# Patient Record
Sex: FEMALE | Race: WHITE | Employment: PART TIME | URBAN - METROPOLITAN AREA
[De-identification: names, ages, dates, MRNs, and addresses within clinical notes are randomized per-mention and may not be internally consistent; named-entity substitution may affect disease eponyms.]

---

## 2017-04-16 NOTE — TELEPHONE ENCOUNTER
Hypothyroid Medications  Protocol Criteria:  Appointment scheduled in the past 12 months or the next 3 months  TSH resulted in the past 12 months that is normal  Recent Visits       Provider Department Primary Dx    1 year ago Narciso Junior MD [place]

## 2017-04-20 RX ORDER — LEVOTHYROXINE SODIUM 175 UG/1
TABLET ORAL
Qty: 90 TABLET | Refills: 3 | OUTPATIENT
Start: 2017-04-20

## 2017-04-20 NOTE — TELEPHONE ENCOUNTER
[id number]. Please schedule an appointment to reestablish with new PCP. Transfer to clinical staff after scheduled.

## 2017-04-22 ENCOUNTER — TELEPHONE (OUTPATIENT)
Dept: INTERNAL MEDICINE CLINIC | Facility: CLINIC | Age: 52
End: 2017-04-22

## 2017-04-22 NOTE — TELEPHONE ENCOUNTER
Patient called back to set up an appt. With new PCP and wants to get her RX refilled  An appt. Has been set up with Dr. Sandi Wisdom for a Physical appt.  For 5/16 at 3PM   Patient would like a call back to confirm she will be able to get her prescription filled

## 2017-04-24 NOTE — TELEPHONE ENCOUNTER
Dr. Dorcas Ravi can we give her 30 day supply with no refills?  Patient has an appointment with you 5/16

## 2017-05-16 ENCOUNTER — OFFICE VISIT (OUTPATIENT)
Dept: INTERNAL MEDICINE CLINIC | Facility: CLINIC | Age: 52
End: 2017-05-16

## 2017-05-16 VITALS
BODY MASS INDEX: 41.91 KG/M2 | WEIGHT: 222 LBS | TEMPERATURE: 99 F | HEIGHT: 61 IN | SYSTOLIC BLOOD PRESSURE: 143 MMHG | HEART RATE: 101 BPM | DIASTOLIC BLOOD PRESSURE: 82 MMHG

## 2017-05-16 DIAGNOSIS — Z12.11 SCREENING FOR COLON CANCER: ICD-10-CM

## 2017-05-16 DIAGNOSIS — E66.01 MORBID OBESITY DUE TO EXCESS CALORIES (HCC): ICD-10-CM

## 2017-05-16 DIAGNOSIS — E03.9 HYPOTHYROIDISM, UNSPECIFIED TYPE: ICD-10-CM

## 2017-05-16 DIAGNOSIS — R06.83 SNORING: ICD-10-CM

## 2017-05-16 DIAGNOSIS — R29.818 SUSPECTED SLEEP APNEA: ICD-10-CM

## 2017-05-16 DIAGNOSIS — Z00.00 PHYSICAL EXAM: Primary | ICD-10-CM

## 2017-05-16 DIAGNOSIS — N95.0 POSTMENOPAUSAL BLEEDING: ICD-10-CM

## 2017-05-16 DIAGNOSIS — Z12.31 VISIT FOR SCREENING MAMMOGRAM: ICD-10-CM

## 2017-05-16 DIAGNOSIS — R73.9 HYPERGLYCEMIA: ICD-10-CM

## 2017-05-16 PROCEDURE — 99396 PREV VISIT EST AGE 40-64: CPT | Performed by: INTERNAL MEDICINE

## 2017-05-16 PROCEDURE — 99213 OFFICE O/P EST LOW 20 MIN: CPT | Performed by: INTERNAL MEDICINE

## 2017-05-16 RX ORDER — LEVOTHYROXINE SODIUM 175 UG/1
175 TABLET ORAL
Qty: 90 TABLET | Refills: 3 | Status: SHIPPED | OUTPATIENT
Start: 2017-05-16 | End: 2018-05-10

## 2017-05-16 NOTE — PROGRESS NOTES
HPI:    Patient ID: Virgil Kendall is a 45year old female.     HPI      PHYSICAL EXAM    REFILL LEVOTHYROXINE    COMPLAIN OF CHRONIC OBESITY  AND CHRONIC FATIGUE  NOTICING SHE IS VERY FORGETFUL  SNORING   FALLS ASLEEP DURING THE DAY    BP NEVER THIS HIGH  BP bruise/bleed easily. Psychiatric/Behavioral: Negative for behavioral problems and agitation. Current Outpatient Prescriptions:  Levothyroxine Sodium (SYNTHROID, LEVOTHROID) 175 MCG Oral Tab Take 1 tablet (175 mcg total) by mouth once daily.  Dis Potassium      3.3 - 5.1 mmol/L 3.9   Chloride      95 - 110 mmol/L 105   Carbon Dioxide, Total      22 - 32 mmol/L 27   BUN      8 - 20 mg/dL 13   CREATININE      0.50 - 1.50 mg/dL 0.65   BUN/CREA RATIO      10.0 - 20.0 20.0   ANION GAP      0 - 18 6 Negative mg/dL Negative   URINE SQUAMOUS EPITHELIAL CELL       Few   URINE WBC      0 - 5 /HPF 1   URINE RBC      0 - 3 /HPF <1   URINE BACTERIA      None seen Few (A)   MICROSCOPIC URINALYSIS COMMENT       Completed   HDL Cholesterol       50   CHOLEST and diagnostic testing reviewed. Dietary and lifestyle change discussed. Modification of risk for CAD discussed. Patient voiced understanding and agrees with current plan and management.         No orders of the defined types were placed in this encounte

## 2017-05-18 ENCOUNTER — APPOINTMENT (OUTPATIENT)
Dept: LAB | Facility: HOSPITAL | Age: 52
End: 2017-05-18
Attending: INTERNAL MEDICINE
Payer: COMMERCIAL

## 2017-05-18 DIAGNOSIS — Z00.00 PHYSICAL EXAM: ICD-10-CM

## 2017-05-18 PROCEDURE — 84443 ASSAY THYROID STIM HORMONE: CPT

## 2017-05-18 PROCEDURE — 85027 COMPLETE CBC AUTOMATED: CPT

## 2017-05-18 PROCEDURE — 80053 COMPREHEN METABOLIC PANEL: CPT

## 2017-05-18 PROCEDURE — 84439 ASSAY OF FREE THYROXINE: CPT

## 2017-05-18 PROCEDURE — 36415 COLL VENOUS BLD VENIPUNCTURE: CPT

## 2017-05-18 PROCEDURE — 82607 VITAMIN B-12: CPT

## 2017-05-18 PROCEDURE — 82306 VITAMIN D 25 HYDROXY: CPT

## 2017-05-18 PROCEDURE — 93010 ELECTROCARDIOGRAM REPORT: CPT | Performed by: INTERNAL MEDICINE

## 2017-05-18 PROCEDURE — 83036 HEMOGLOBIN GLYCOSYLATED A1C: CPT

## 2017-05-18 PROCEDURE — 80061 LIPID PANEL: CPT

## 2017-05-18 PROCEDURE — 82746 ASSAY OF FOLIC ACID SERUM: CPT

## 2017-05-18 PROCEDURE — 81001 URINALYSIS AUTO W/SCOPE: CPT

## 2017-05-18 PROCEDURE — 93005 ELECTROCARDIOGRAM TRACING: CPT

## 2017-05-23 ENCOUNTER — TELEPHONE (OUTPATIENT)
Dept: INTERNAL MEDICINE CLINIC | Facility: CLINIC | Age: 52
End: 2017-05-23

## 2017-05-23 NOTE — TELEPHONE ENCOUNTER
----- Message from Maria Luisa Ovalle MD sent at 5/23/2017  7:28 AM CDT -----  Send  Letter and copy of test result.   ABNORMAL EKG    POOR R WAVE PROGRESSION  NO ACUTE FINDINGS  FOLLOW UP TO DISCUS WITHIN THE NEXT MONTH

## 2017-05-26 ENCOUNTER — HOSPITAL ENCOUNTER (OUTPATIENT)
Dept: ULTRASOUND IMAGING | Age: 52
Discharge: HOME | End: 2017-05-26
Attending: INTERNAL MEDICINE
Payer: COMMERCIAL

## 2017-05-26 ENCOUNTER — HOSPITAL ENCOUNTER (OUTPATIENT)
Dept: MAMMOGRAPHY | Age: 52
Discharge: HOME | End: 2017-05-26
Attending: INTERNAL MEDICINE
Payer: COMMERCIAL

## 2017-05-26 DIAGNOSIS — Z12.31 VISIT FOR SCREENING MAMMOGRAM: ICD-10-CM

## 2017-05-26 DIAGNOSIS — N95.0 POSTMENOPAUSAL BLEEDING: ICD-10-CM

## 2017-05-26 PROCEDURE — 77067 SCR MAMMO BI INCL CAD: CPT | Performed by: INTERNAL MEDICINE

## 2017-05-27 ENCOUNTER — TELEPHONE (OUTPATIENT)
Dept: INTERNAL MEDICINE CLINIC | Facility: CLINIC | Age: 52
End: 2017-05-27

## 2017-05-27 DIAGNOSIS — E55.9 VITAMIN D DEFICIENCY: Primary | ICD-10-CM

## 2017-05-27 NOTE — TELEPHONE ENCOUNTER
Notes Recorded by Kentrell Torrez MD on 5/20/2017 at 10:02 PM  Vitamin D is low  Please call patient and give Vit D 50,000 units once a week for 12 wks   Send patient and order for Vit D level in 12 wks  Give Vit  D 50,000 units  # 12  Once a week    [facility]

## 2017-06-01 ENCOUNTER — HOSPITAL ENCOUNTER (OUTPATIENT)
Dept: ULTRASOUND IMAGING | Age: 52
Discharge: HOME | End: 2017-06-01
Attending: INTERNAL MEDICINE
Payer: COMMERCIAL

## 2017-06-01 PROCEDURE — 76856 US EXAM PELVIC COMPLETE: CPT | Performed by: INTERNAL MEDICINE

## 2017-06-01 PROCEDURE — 76830 TRANSVAGINAL US NON-OB: CPT | Performed by: INTERNAL MEDICINE

## 2017-07-18 ENCOUNTER — OFFICE VISIT (OUTPATIENT)
Dept: SLEEP CENTER | Age: 52
End: 2017-07-18
Attending: INTERNAL MEDICINE
Payer: COMMERCIAL

## 2017-07-18 DIAGNOSIS — Z76.89 SLEEP CONCERN: Primary | ICD-10-CM

## 2017-07-18 PROCEDURE — 95810 POLYSOM 6/> YRS 4/> PARAM: CPT

## 2017-07-19 ENCOUNTER — OFFICE VISIT (OUTPATIENT)
Dept: OBGYN CLINIC | Facility: CLINIC | Age: 52
End: 2017-07-19

## 2017-07-19 VITALS
SYSTOLIC BLOOD PRESSURE: 143 MMHG | HEART RATE: 63 BPM | WEIGHT: 223 LBS | DIASTOLIC BLOOD PRESSURE: 83 MMHG | BODY MASS INDEX: 42 KG/M2

## 2017-07-19 DIAGNOSIS — N95.0 POSTMENOPAUSAL BLEEDING: Primary | ICD-10-CM

## 2017-07-19 PROCEDURE — 58100 BIOPSY OF UTERUS LINING: CPT | Performed by: OBSTETRICS & GYNECOLOGY

## 2017-07-19 PROCEDURE — 99202 OFFICE O/P NEW SF 15 MIN: CPT | Performed by: OBSTETRICS & GYNECOLOGY

## 2017-07-20 NOTE — PROCEDURES
[address] [place] [address]  Accredited by the [place] [employer] (AASM)    PATIENT'S NAME: Marcus Pennington   ATTENDING PHYSICIAN: Jaime Yan. Jose Martinez MD   REFERRING PHYSICIAN: Jaime Yan.  Jose Martinez MD   PATIENT ACCOUNT #: [id number] LOCATION:  spontaneous arousal index is 6.0 events per hour for a combined arousal index of 8.6 events per hour. There were no significant periodic limb movements. The lowest desaturation was to 86%.   The average heart rate was 73 beats per minute and there was no

## 2017-07-20 NOTE — PROGRESS NOTES
Alvin Jackson is a 45year old female [id number] Patient's last menstrual period was 04/22/2017. Patient presents with:  Postmenopausal Bleeding: New Patient     New pt. Thinks she went through menopause in her 45s.   However, in last 5 years, she had 3 epis motion  Uterus: normal in size, contour, position, mobility, without tenderness  Adnexa: normal without masses or tenderness      ENDOMETRIAL BIOPSY  After getting informed consent,  Speculum placed in vagina. Cleaned with betadine.   Single tooth tenecul

## 2017-08-01 ENCOUNTER — TELEPHONE (OUTPATIENT)
Dept: INTERNAL MEDICINE CLINIC | Facility: CLINIC | Age: 52
End: 2017-08-01

## 2017-08-01 NOTE — TELEPHONE ENCOUNTER
Dr. Yancy Street-    Patient is asking for clarification--should she continue Vit D? She has taken 10 of 12 tablets. She is requesting to get level drawn prior to office visit--she has a level ordered to be drawn 8/27/17. Can she get her blood drawn first/early?

## 2017-08-01 NOTE — TELEPHONE ENCOUNTER
Actions Requested: advise needed  Problem: dry, scaly skin  Onset and Timin weeks ago  Associated Symptoms: dry, scaly skin on arms, back, legs  Aggravating by: n/a  Alleviated by: n/a  Triage Note: Patient states her Vitamin D level on 17 was low

## 2017-08-03 NOTE — TELEPHONE ENCOUNTER
Get blood drawn and I will give additional script  Please clarify your question   Not veryclear to me    The order is there

## 2017-08-07 ENCOUNTER — APPOINTMENT (OUTPATIENT)
Dept: LAB | Age: 52
End: 2017-08-07
Attending: INTERNAL MEDICINE
Payer: COMMERCIAL

## 2017-08-07 DIAGNOSIS — E55.9 VITAMIN D DEFICIENCY: ICD-10-CM

## 2017-08-07 PROCEDURE — 82306 VITAMIN D 25 HYDROXY: CPT

## 2017-08-07 PROCEDURE — 36415 COLL VENOUS BLD VENIPUNCTURE: CPT

## 2017-08-08 LAB — 25(OH)D3 SERPL-MCNC: 30 NG/ML

## 2017-08-11 RX ORDER — ERGOCALCIFEROL 1.25 MG/1
CAPSULE ORAL
Qty: 12 CAPSULE | Refills: 0 | Status: SHIPPED | OUTPATIENT
Start: 2017-08-11 | End: 2018-11-13

## 2017-10-18 ENCOUNTER — OFFICE VISIT (OUTPATIENT)
Dept: SLEEP CENTER | Age: 52
End: 2017-10-18
Attending: INTERNAL MEDICINE
Payer: COMMERCIAL

## 2017-10-18 DIAGNOSIS — Z76.89 SLEEP CONCERN: Primary | ICD-10-CM

## 2017-10-18 PROCEDURE — 95811 POLYSOM 6/>YRS CPAP 4/> PARM: CPT

## 2017-10-23 NOTE — PROCEDURES
[facility]  Accredited by the [place] [employer] (AASM)    PATIENT'S NAME: Shae Valverde   ATTENDING PHYSICIAN: Jeff Lebron. Sandi Wisdom MD   REFERRING PHYSICIAN: Jeff Lebron.  Sandi Wisdom MD   PATIENT ACCOUNT #: [id number] LOCATION:  There were no significant periodic limb movements. The lowest desaturation was to 90%. The average heart rate was 73 beats per minute. There was no significant bradycardia, asystole, tachycardia, nor atrial fibrillation.   CPAP was initiated at 5 CWP and

## 2017-10-26 ENCOUNTER — TELEPHONE (OUTPATIENT)
Dept: INTERNAL MEDICINE CLINIC | Facility: CLINIC | Age: 52
End: 2017-10-26

## 2017-10-26 DIAGNOSIS — G47.33 OBSTRUCTIVE SLEEP APNEA: Primary | ICD-10-CM

## 2017-10-26 NOTE — TELEPHONE ENCOUNTER
----- Message from Saritha Gay RN sent at 10/24/2017  8:26 AM CDT -----      ----- Message -----  From: Diana Hernandez MD  Sent: 10/24/2017   1:18 AM  To: Any Uriarte Lpn/Zuhair    Send letter and copy of test result.   Order CPaP if patient is agreeable  Gi

## 2017-10-26 NOTE — TELEPHONE ENCOUNTER
Pt returned call, verified name and . Reviewed test results and recommendations with pt per doctor's instructions. Provided pt with contact information for Dr. Martina Cedillo.  Pt concerned about cost of CPAP, she would like to contact her insurance provider [email or web address]

## 2017-11-03 NOTE — TELEPHONE ENCOUNTER
Advised patient of Dr. Chiang Serum note and number provided to Dr Tim Sullivan, also gave [facility]. Patient verbalized understanding. Patient did contact her insurance and they indicated that the CPAP machine would be free.  Just need D

## 2017-12-05 ENCOUNTER — TELEPHONE (OUTPATIENT)
Dept: INTERNAL MEDICINE CLINIC | Facility: CLINIC | Age: 52
End: 2017-12-05

## 2017-12-05 NOTE — TELEPHONE ENCOUNTER
Need order for c pap faxed over to [facility]   Fax [phone]  Look like order is in the system already

## 2017-12-13 NOTE — TELEPHONE ENCOUNTER
Desmond Layton is calling from [facility] that they have not receive the order want to know if it can be refax today

## 2017-12-27 ENCOUNTER — TELEPHONE (OUTPATIENT)
Dept: INTERNAL MEDICINE CLINIC | Facility: CLINIC | Age: 52
End: 2017-12-27

## 2017-12-27 NOTE — TELEPHONE ENCOUNTER
Jil Jean from [facility] called states he received orders for c pap but did not receive any office notes prior to sleep study and the sleep study results itself   Jil Jean asking to fax to [phone]

## 2018-01-18 ENCOUNTER — OFFICE VISIT (OUTPATIENT)
Dept: PULMONOLOGY | Facility: CLINIC | Age: 53
End: 2018-01-18

## 2018-01-18 VITALS
DIASTOLIC BLOOD PRESSURE: 88 MMHG | OXYGEN SATURATION: 99 % | WEIGHT: 229.38 LBS | RESPIRATION RATE: 18 BRPM | HEART RATE: 84 BPM | SYSTOLIC BLOOD PRESSURE: 132 MMHG | BODY MASS INDEX: 43.3 KG/M2 | HEIGHT: 61 IN

## 2018-01-18 DIAGNOSIS — G47.33 OSA (OBSTRUCTIVE SLEEP APNEA): Primary | ICD-10-CM

## 2018-01-18 PROCEDURE — 99212 OFFICE O/P EST SF 10 MIN: CPT | Performed by: INTERNAL MEDICINE

## 2018-01-18 PROCEDURE — 99213 OFFICE O/P EST LOW 20 MIN: CPT | Performed by: INTERNAL MEDICINE

## 2018-01-18 NOTE — PATIENT INSTRUCTIONS
You need to follow up 1-3 months after your CPAP or BiPAP titration study. It is very important that you use your machine every night.

## 2018-01-18 NOTE — PROGRESS NOTES
Pulmonary Consult Note    HPI:   Federica Sorenson is a 45year old female with Patient presents with:  Consult: Pt stts she is being set up with a cpap machine from [facility] next week.   Sleep Problem    Latrice Harrell MD      Pt with recent dx of KIRTI NAD  A&Ox3  Class II airway  Head AT/NC  Anicteric  Lung cta  CV reg distant  Ext No edema  Skin No rash  Psych Normal mood           ASSESSMENT/PLAN:     (G47.33) KIRTI (obstructive sleep apnea)  (primary encounter diagnosis)  Mild but moderate in REM  Si

## 2018-01-31 NOTE — TELEPHONE ENCOUNTER
LOV: 5-16-17 Last Rx: 8-11-17    No protocol     Please advise in regards to refill request. Thank You

## 2018-02-02 RX ORDER — ERGOCALCIFEROL 1.25 MG/1
CAPSULE ORAL
Qty: 12 CAPSULE | Refills: 0 | OUTPATIENT
Start: 2018-02-02

## 2018-03-22 ENCOUNTER — OFFICE VISIT (OUTPATIENT)
Dept: PULMONOLOGY | Facility: CLINIC | Age: 53
End: 2018-03-22

## 2018-03-22 VITALS
BODY MASS INDEX: 43.73 KG/M2 | HEIGHT: 61 IN | RESPIRATION RATE: 19 BRPM | HEART RATE: 85 BPM | OXYGEN SATURATION: 97 % | DIASTOLIC BLOOD PRESSURE: 82 MMHG | WEIGHT: 231.63 LBS | SYSTOLIC BLOOD PRESSURE: 135 MMHG

## 2018-03-22 DIAGNOSIS — G47.33 OSA (OBSTRUCTIVE SLEEP APNEA): Primary | ICD-10-CM

## 2018-03-22 PROCEDURE — 99213 OFFICE O/P EST LOW 20 MIN: CPT | Performed by: INTERNAL MEDICINE

## 2018-03-22 PROCEDURE — 99212 OFFICE O/P EST SF 10 MIN: CPT | Performed by: INTERNAL MEDICINE

## 2018-03-22 NOTE — PROGRESS NOTES
Pulmonary Note    HPI:   Lissa Nguyen is a 52year old female with Patient presents with:   Follow - Up      PCP Alma Rosa Grimaldo MD        Pt here for CPAP f/u  Using every night  Feels more clear headed in AM  Waking up less in middle  No dramatic change    N

## 2018-05-01 NOTE — TELEPHONE ENCOUNTER
LOV: 5/16/17 Last Rx: 8/11/17    No protocol     Please advise in regards to refill request. Thank You

## 2018-05-03 RX ORDER — ERGOCALCIFEROL 1.25 MG/1
CAPSULE ORAL
Qty: 12 CAPSULE | Refills: 0 | OUTPATIENT
Start: 2018-05-03

## 2018-05-10 NOTE — TELEPHONE ENCOUNTER
Refill protocol failed because the patient did not meet the protocol criteria.  Please advise in regards to refill request     Hypothyroid Medications  Protocol Criteria:  Appointment scheduled in the past 12 months or the next 3 months  TSH resulted in the past 12 months that is normal  Recent Outpatient Visits            1 month ago KIRTI (obstructive sleep apnea)    [facility] Dickerson Bryant Penner, MD    Office Visit    3 months ago KIRTI (obstructive sleep apnea)    Felipa Dickerson [place] Vish Peter Tish Sawyers, MD    Office Visit    6 months ago Sleep concern    [address] [facility]    Office Visit    9 months ago Postmenopausal bleeding    [facility], Angie Romero MD    Office Visit    9 months ago Sleep concern    [address] [facility]    Office Visit            Lab Results  Component Value Date   TSH 0.20 (L) 05/18/2017

## 2018-05-11 RX ORDER — LEVOTHYROXINE SODIUM 175 UG/1
TABLET ORAL
Qty: 90 TABLET | Refills: 0 | Status: SHIPPED | OUTPATIENT
Start: 2018-05-11 | End: 2018-08-15

## 2018-08-16 RX ORDER — LEVOTHYROXINE SODIUM 175 UG/1
TABLET ORAL
Qty: 90 TABLET | Refills: 0 | Status: SHIPPED | OUTPATIENT
Start: 2018-08-16 | End: 2018-11-13

## 2018-09-26 ENCOUNTER — PATIENT OUTREACH (OUTPATIENT)
Dept: CASE MANAGEMENT | Age: 53
End: 2018-09-26

## 2018-11-13 ENCOUNTER — OFFICE VISIT (OUTPATIENT)
Dept: INTERNAL MEDICINE CLINIC | Facility: CLINIC | Age: 53
End: 2018-11-13

## 2018-11-13 VITALS
DIASTOLIC BLOOD PRESSURE: 81 MMHG | BODY MASS INDEX: 43.23 KG/M2 | HEART RATE: 73 BPM | SYSTOLIC BLOOD PRESSURE: 135 MMHG | WEIGHT: 229 LBS | HEIGHT: 61 IN

## 2018-11-13 DIAGNOSIS — G47.33 OBSTRUCTIVE SLEEP APNEA: ICD-10-CM

## 2018-11-13 DIAGNOSIS — E03.9 HYPOTHYROIDISM, UNSPECIFIED TYPE: ICD-10-CM

## 2018-11-13 DIAGNOSIS — Z00.00 ROUTINE GENERAL MEDICAL EXAMINATION AT A HEALTH CARE FACILITY: Primary | ICD-10-CM

## 2018-11-13 DIAGNOSIS — Z12.31 VISIT FOR SCREENING MAMMOGRAM: ICD-10-CM

## 2018-11-13 DIAGNOSIS — E55.9 VITAMIN D DEFICIENCY: ICD-10-CM

## 2018-11-13 DIAGNOSIS — Z12.11 SCREENING FOR COLON CANCER: ICD-10-CM

## 2018-11-13 DIAGNOSIS — R73.9 HYPERGLYCEMIA: ICD-10-CM

## 2018-11-13 DIAGNOSIS — E66.01 MORBID OBESITY DUE TO EXCESS CALORIES (HCC): ICD-10-CM

## 2018-11-13 PROCEDURE — 99396 PREV VISIT EST AGE 40-64: CPT | Performed by: INTERNAL MEDICINE

## 2018-11-13 RX ORDER — LEVOTHYROXINE SODIUM 175 UG/1
175 TABLET ORAL
Qty: 90 TABLET | Refills: 3 | Status: SHIPPED | OUTPATIENT
Start: 2018-11-13 | End: 2018-11-28

## 2018-11-13 NOTE — PATIENT INSTRUCTIONS
Wt Readings from Last 6 Encounters:  11/13/18 : 229 lb (103.9 kg)  03/22/18 : 231 lb 9.6 oz (105.1 kg)  01/18/18 : 229 lb 6.4 oz (104.1 kg)  07/19/17 : 223 lb (101.2 kg)  05/16/17 : 222 lb (100.7 kg)  03/01/16 : 220 lb (99.8 kg)

## 2018-11-13 NOTE — PROGRESS NOTES
HPI:    Patient ID: Patricia Quintana is a 52year old female.     HPI   Physical exam    Denies complaint exempt weight problem      /81 (BP Location: Right arm, Patient Position: Sitting, Cuff Size: large)   Pulse 73   Ht 5' 1\" (1.549 m)   Wt 229 lb (103 Morbid obesity (HCC)    • KIRTI (obstructive sleep apnea)       Past Surgical History:   Procedure Laterality Date   •       x 4      Family History   Problem Relation Age of Onset   • Diabetes Mother    • Glaucoma Mother    • Cancer Mother facility  (primary encounter diagnosis)  Plan: ASSAY, THYROID STIM HORMONE, FREE T4 (FREE         THYROXINE), LIPID PANEL, CBC, PLATELET; NO         DIFFERENTIAL, COMP METABOLIC PANEL (14),         HEMOGLOBIN A1C, URINE MICROSCOPIC W REFLEX         CULTURE

## 2018-11-23 ENCOUNTER — APPOINTMENT (OUTPATIENT)
Dept: LAB | Facility: HOSPITAL | Age: 53
End: 2018-11-23
Attending: INTERNAL MEDICINE
Payer: COMMERCIAL

## 2018-11-23 DIAGNOSIS — Z00.00 ROUTINE GENERAL MEDICAL EXAMINATION AT A HEALTH CARE FACILITY: ICD-10-CM

## 2018-11-23 DIAGNOSIS — Z12.11 SCREENING FOR COLON CANCER: ICD-10-CM

## 2018-11-23 PROCEDURE — 81015 MICROSCOPIC EXAM OF URINE: CPT

## 2018-11-23 PROCEDURE — 80053 COMPREHEN METABOLIC PANEL: CPT

## 2018-11-23 PROCEDURE — 82274 ASSAY TEST FOR BLOOD FECAL: CPT

## 2018-11-23 PROCEDURE — 36415 COLL VENOUS BLD VENIPUNCTURE: CPT

## 2018-11-23 PROCEDURE — 80061 LIPID PANEL: CPT

## 2018-11-23 PROCEDURE — 84443 ASSAY THYROID STIM HORMONE: CPT

## 2018-11-23 PROCEDURE — 83036 HEMOGLOBIN GLYCOSYLATED A1C: CPT

## 2018-11-23 PROCEDURE — 85027 COMPLETE CBC AUTOMATED: CPT

## 2018-11-23 PROCEDURE — 84439 ASSAY OF FREE THYROXINE: CPT

## 2018-11-28 DIAGNOSIS — E03.9 ACQUIRED HYPOTHYROIDISM: Primary | ICD-10-CM

## 2018-11-28 RX ORDER — LEVOTHYROXINE SODIUM 0.15 MG/1
150 TABLET ORAL
Qty: 30 TABLET | Refills: 2 | Status: SHIPPED | OUTPATIENT
Start: 2018-11-28 | End: 2019-02-23

## 2019-01-14 ENCOUNTER — OFFICE VISIT (OUTPATIENT)
Dept: INTERNAL MEDICINE CLINIC | Facility: CLINIC | Age: 54
End: 2019-01-14

## 2019-01-14 ENCOUNTER — NURSE TRIAGE (OUTPATIENT)
Dept: OTHER | Age: 54
End: 2019-01-14

## 2019-01-14 ENCOUNTER — APPOINTMENT (OUTPATIENT)
Dept: LAB | Age: 54
End: 2019-01-14
Attending: INTERNAL MEDICINE
Payer: COMMERCIAL

## 2019-01-14 VITALS
HEART RATE: 88 BPM | BODY MASS INDEX: 44.37 KG/M2 | HEIGHT: 61 IN | WEIGHT: 235 LBS | DIASTOLIC BLOOD PRESSURE: 82 MMHG | TEMPERATURE: 99 F | SYSTOLIC BLOOD PRESSURE: 149 MMHG

## 2019-01-14 DIAGNOSIS — R39.9 UTI SYMPTOMS: Primary | ICD-10-CM

## 2019-01-14 DIAGNOSIS — E03.9 ACQUIRED HYPOTHYROIDISM: ICD-10-CM

## 2019-01-14 LAB
BILIRUBIN: NEGATIVE
GLUCOSE (URINE DIPSTICK): NEGATIVE MG/DL
KETONES (URINE DIPSTICK): NEGATIVE MG/DL
MULTISTIX LOT#: NORMAL NUMERIC
NITRITE, URINE: NEGATIVE
PH, URINE: 5.5 (ref 4.5–8)
PROTEIN (URINE DIPSTICK): 30 MG/DL
SPECIFIC GRAVITY: 1.02 (ref 1–1.03)
T4 FREE SERPL-MCNC: 1.25 NG/DL (ref 0.58–1.64)
TSH SERPL-ACNC: 0.26 UIU/ML (ref 0.45–5.33)
URINE-COLOR: YELLOW
UROBILINOGEN,SEMI-QN: 0.2 MG/DL (ref 0–1.9)

## 2019-01-14 PROCEDURE — 99213 OFFICE O/P EST LOW 20 MIN: CPT | Performed by: INTERNAL MEDICINE

## 2019-01-14 PROCEDURE — 84439 ASSAY OF FREE THYROXINE: CPT

## 2019-01-14 PROCEDURE — 84443 ASSAY THYROID STIM HORMONE: CPT

## 2019-01-14 PROCEDURE — 36415 COLL VENOUS BLD VENIPUNCTURE: CPT

## 2019-01-14 PROCEDURE — 81002 URINALYSIS NONAUTO W/O SCOPE: CPT | Performed by: INTERNAL MEDICINE

## 2019-01-14 PROCEDURE — 99212 OFFICE O/P EST SF 10 MIN: CPT | Performed by: INTERNAL MEDICINE

## 2019-01-14 RX ORDER — CEPHALEXIN 500 MG/1
500 CAPSULE ORAL 4 TIMES DAILY
Qty: 20 CAPSULE | Refills: 0 | Status: SHIPPED | OUTPATIENT
Start: 2019-01-14 | End: 2021-05-03 | Stop reason: ALTCHOICE

## 2019-01-14 NOTE — TELEPHONE ENCOUNTER
Action Requested: Summary for Provider     []  Critical Lab, Recommendations Needed  [] Need Additional Advice  []   FYI    []   Need Orders  [] Need Medications Sent to Pharmacy  []  Other     SUMMARY: Appointment made with Dr Lico Nelson today 1/14/19 at 1

## 2019-01-14 NOTE — PROGRESS NOTES
HPI:    Patient ID: Beth Guillermo is a 52year old female.     HPI      burring with urination  No blood  Right flank pain  No fever        /82 (BP Location: Right arm, Patient Position: Sitting, Cuff Size: large)   Pulse 88   Temp 98.6 °F (37 °C) (Oral Smoking status: Never Smoker      Smokeless tobacco: Never Used    Alcohol use: No      Alcohol/week: 0.0 oz    Drug use: No       PHYSICAL EXAM:    Physical Exam   Constitutional: She appears well-developed and well-nourished.    Eyes: Conjunctivae are no

## 2019-01-17 ENCOUNTER — TELEPHONE (OUTPATIENT)
Dept: INTERNAL MEDICINE CLINIC | Facility: CLINIC | Age: 54
End: 2019-01-17

## 2019-01-17 DIAGNOSIS — G47.33 OSA (OBSTRUCTIVE SLEEP APNEA): Primary | ICD-10-CM

## 2019-01-17 NOTE — TELEPHONE ENCOUNTER
Dr. Javier Phipps,    Please see message below and sign off on referral if you agree.     Thank you, Wendy Kinacid Small-Referral Specialist.

## 2019-01-17 NOTE — TELEPHONE ENCOUNTER
Received a fax from [employer] requesting DME supplies . [employer] fax : [phone] Phone: [phone] .           Full face frame   Full face cushion   Nasal mask cushion    Nasal pillows   Nasal Mask    Headgear

## 2019-02-23 RX ORDER — LEVOTHYROXINE SODIUM 0.15 MG/1
TABLET ORAL
Qty: 30 TABLET | Refills: 0 | Status: SHIPPED | OUTPATIENT
Start: 2019-02-23 | End: 2019-02-23

## 2019-02-24 RX ORDER — LEVOTHYROXINE SODIUM 0.15 MG/1
TABLET ORAL
Qty: 90 TABLET | Refills: 3 | Status: SHIPPED | OUTPATIENT
Start: 2019-02-24 | End: 2020-03-29

## 2019-02-24 NOTE — TELEPHONE ENCOUNTER
Review pended refill request as it does not fall under a protocol.     Last Rx: 11-28-18  LOV: 1-14-19

## 2019-05-30 ENCOUNTER — TELEPHONE (OUTPATIENT)
Dept: CASE MANAGEMENT | Age: 54
End: 2019-05-30

## 2019-05-30 NOTE — TELEPHONE ENCOUNTER
Patient is due for cervical cancer screening. Discussed in detail w/patient. Appt scheduled for 6/25/19.

## 2020-03-29 RX ORDER — LEVOTHYROXINE SODIUM 0.15 MG/1
TABLET ORAL
Qty: 90 TABLET | Refills: 3 | Status: SHIPPED | OUTPATIENT
Start: 2020-03-29 | End: 2021-05-03

## 2020-03-31 RX ORDER — LEVOTHYROXINE SODIUM 0.15 MG/1
TABLET ORAL
Qty: 90 TABLET | Refills: 3 | OUTPATIENT
Start: 2020-03-31

## 2020-04-16 ENCOUNTER — TELEPHONE (OUTPATIENT)
Dept: INTERNAL MEDICINE CLINIC | Facility: CLINIC | Age: 55
End: 2020-04-16

## 2020-04-16 DIAGNOSIS — G47.33 OBSTRUCTIVE SLEEP APNEA (ADULT) (PEDIATRIC): Primary | ICD-10-CM

## 2020-04-16 NOTE — TELEPHONE ENCOUNTER
Hi Dr. Neo Barnes,    Please sign off on referral.    Thank you, [facility] Specialist    Managed Care.

## 2020-04-16 NOTE — TELEPHONE ENCOUNTER
Received fax from [facility] requesting for the following CPAP supplies . Any additional questions please contact Anna Mederos , fax authorization to [phone].      Heated Tubing - 1   Full Face Mask - 1   Cushion for Full mask (for ) - 3  A70

## 2020-12-01 ENCOUNTER — TELEPHONE (OUTPATIENT)
Dept: INTERNAL MEDICINE CLINIC | Facility: CLINIC | Age: 55
End: 2020-12-01

## 2020-12-01 DIAGNOSIS — G47.33 OBSTRUCTIVE SLEEP APNEA (ADULT) (PEDIATRIC): Primary | ICD-10-CM

## 2020-12-01 NOTE — TELEPHONE ENCOUNTER
Received fax from [facility] requesting the following DME supplies :      Humidifier chamber - 1   Disposable Filter - 6   Nasal Mask - 1   Headgear - 1   Heated Tubing - 1   Cushion for Nasal Mask ( for ) - 6         Any additi

## 2020-12-11 NOTE — TELEPHONE ENCOUNTER
A new referral needs to be entered into Epic. Thank you, Orestes Ash    [facility].

## 2020-12-15 NOTE — TELEPHONE ENCOUNTER
Faxed copy of DME order to [phone], faxed confirmation was successful.  Confirmation fax sent to scanning

## 2021-03-28 RX ORDER — LEVOTHYROXINE SODIUM 0.15 MG/1
TABLET ORAL
Qty: 90 TABLET | Refills: 3 | OUTPATIENT
Start: 2021-03-28

## 2021-04-21 RX ORDER — LEVOTHYROXINE SODIUM 0.15 MG/1
150 TABLET ORAL
Qty: 90 TABLET | Refills: 3 | OUTPATIENT
Start: 2021-04-21

## 2021-04-23 RX ORDER — LEVOTHYROXINE SODIUM 0.15 MG/1
TABLET ORAL
Qty: 90 TABLET | Refills: 3 | OUTPATIENT
Start: 2021-04-23

## 2021-05-03 ENCOUNTER — OFFICE VISIT (OUTPATIENT)
Dept: INTERNAL MEDICINE CLINIC | Facility: CLINIC | Age: 56
End: 2021-05-03

## 2021-05-03 VITALS
WEIGHT: 229 LBS | SYSTOLIC BLOOD PRESSURE: 133 MMHG | HEIGHT: 61 IN | BODY MASS INDEX: 43.23 KG/M2 | DIASTOLIC BLOOD PRESSURE: 78 MMHG | HEART RATE: 77 BPM

## 2021-05-03 DIAGNOSIS — E66.01 MORBID OBESITY (HCC): ICD-10-CM

## 2021-05-03 DIAGNOSIS — H93.19 TINNITUS, UNSPECIFIED LATERALITY: ICD-10-CM

## 2021-05-03 DIAGNOSIS — D22.9 NUMEROUS MOLES: ICD-10-CM

## 2021-05-03 DIAGNOSIS — Z01.419 GYNECOLOGIC EXAM NORMAL: ICD-10-CM

## 2021-05-03 DIAGNOSIS — G47.33 OSA (OBSTRUCTIVE SLEEP APNEA): ICD-10-CM

## 2021-05-03 DIAGNOSIS — Z12.11 COLON CANCER SCREENING: ICD-10-CM

## 2021-05-03 DIAGNOSIS — Z00.00 ROUTINE GENERAL MEDICAL EXAMINATION AT A HEALTH CARE FACILITY: Primary | ICD-10-CM

## 2021-05-03 DIAGNOSIS — Z12.31 VISIT FOR SCREENING MAMMOGRAM: ICD-10-CM

## 2021-05-03 DIAGNOSIS — Z01.00 EYE EXAM NORMAL: ICD-10-CM

## 2021-05-03 DIAGNOSIS — L98.9 SKIN LESION: ICD-10-CM

## 2021-05-03 PROCEDURE — 3075F SYST BP GE 130 - 139MM HG: CPT | Performed by: INTERNAL MEDICINE

## 2021-05-03 PROCEDURE — 3078F DIAST BP <80 MM HG: CPT | Performed by: INTERNAL MEDICINE

## 2021-05-03 PROCEDURE — 3008F BODY MASS INDEX DOCD: CPT | Performed by: INTERNAL MEDICINE

## 2021-05-03 PROCEDURE — 99396 PREV VISIT EST AGE 40-64: CPT | Performed by: INTERNAL MEDICINE

## 2021-05-03 RX ORDER — LEVOTHYROXINE SODIUM 0.15 MG/1
150 TABLET ORAL
Qty: 90 TABLET | Refills: 1 | Status: SHIPPED | OUTPATIENT
Start: 2021-05-03 | End: 2021-07-28 | Stop reason: ALTCHOICE

## 2021-05-04 NOTE — PROGRESS NOTES
HPI:    Patient ID: Lilliana Gottlieb is a 60year old female.     HPI    Physical exam    weght problem since she had her 4 children who are now in college  Feeling well in general    /78 (BP Location: Right arm, Patient Position: Sitting, Cuff Size: large History:   Diagnosis Date   • Abnormal uterine bleeding 4/23/17    Last occurence   • Amenorrhea    • Dyspareunia    • Hypothyroidism    • Morbid obesity ([facility] [address]    • KIRTI (obstructive sleep apnea)       Past Surgical History:   Procedure Laterality Date   • Cervical: No cervical adenopathy. Skin:     Findings: No erythema or rash. Neurological:      Mental Status: She is alert.               ASSESSMENT/PLAN:   (Z00.00) Routine general medical examination at a health care facility  (primary encounter diagno [E]      Meds This Visit:  Requested Prescriptions     Signed Prescriptions Disp Refills   • Levothyroxine Sodium 150 MCG Oral Tab 90 tablet 1     Sig: Take 1 tablet (150 mcg total) by mouth before breakfast.       Imaging & Referrals:  ENT - INTERNAL  OPH

## 2021-05-08 ENCOUNTER — LAB ENCOUNTER (OUTPATIENT)
Dept: LAB | Facility: HOSPITAL | Age: 56
End: 2021-05-08
Attending: INTERNAL MEDICINE
Payer: COMMERCIAL

## 2021-05-08 DIAGNOSIS — Z00.00 ROUTINE GENERAL MEDICAL EXAMINATION AT A HEALTH CARE FACILITY: ICD-10-CM

## 2021-05-08 PROCEDURE — 80061 LIPID PANEL: CPT

## 2021-05-08 PROCEDURE — 84443 ASSAY THYROID STIM HORMONE: CPT

## 2021-05-08 PROCEDURE — 85027 COMPLETE CBC AUTOMATED: CPT

## 2021-05-08 PROCEDURE — 36415 COLL VENOUS BLD VENIPUNCTURE: CPT

## 2021-05-08 PROCEDURE — 81015 MICROSCOPIC EXAM OF URINE: CPT

## 2021-05-08 PROCEDURE — 83036 HEMOGLOBIN GLYCOSYLATED A1C: CPT

## 2021-05-08 PROCEDURE — 84439 ASSAY OF FREE THYROXINE: CPT

## 2021-05-08 PROCEDURE — 80053 COMPREHEN METABOLIC PANEL: CPT

## 2021-05-11 DIAGNOSIS — E03.9 ACQUIRED HYPOTHYROIDISM: Primary | ICD-10-CM

## 2021-05-11 RX ORDER — LEVOTHYROXINE SODIUM 175 UG/1
175 TABLET ORAL
Qty: 90 TABLET | Refills: 0 | Status: SHIPPED | OUTPATIENT
Start: 2021-05-11 | End: 2021-08-07

## 2021-06-28 ENCOUNTER — HOSPITAL ENCOUNTER (OUTPATIENT)
Dept: MAMMOGRAPHY | Age: 56
Discharge: HOME | End: 2021-06-28
Attending: INTERNAL MEDICINE
Payer: COMMERCIAL

## 2021-06-28 DIAGNOSIS — Z12.31 VISIT FOR SCREENING MAMMOGRAM: ICD-10-CM

## 2021-06-28 PROCEDURE — 77063 BREAST TOMOSYNTHESIS BI: CPT | Performed by: INTERNAL MEDICINE

## 2021-06-28 PROCEDURE — 77067 SCR MAMMO BI INCL CAD: CPT | Performed by: INTERNAL MEDICINE

## 2021-07-19 ENCOUNTER — TELEPHONE (OUTPATIENT)
Dept: INTERNAL MEDICINE CLINIC | Facility: CLINIC | Age: 56
End: 2021-07-19

## 2021-07-19 ENCOUNTER — HOSPITAL ENCOUNTER (OUTPATIENT)
Dept: MAMMOGRAPHY | Facility: HOSPITAL | Age: 56
Discharge: HOME | End: 2021-07-19
Attending: INTERNAL MEDICINE
Payer: COMMERCIAL

## 2021-07-19 ENCOUNTER — HOSPITAL ENCOUNTER (OUTPATIENT)
Dept: ULTRASOUND IMAGING | Facility: HOSPITAL | Age: 56
Discharge: HOME | End: 2021-07-19
Attending: INTERNAL MEDICINE
Payer: COMMERCIAL

## 2021-07-19 DIAGNOSIS — R92.8 ABNORMAL MAMMOGRAM: ICD-10-CM

## 2021-07-19 DIAGNOSIS — N63.0 BREAST MASS: Primary | ICD-10-CM

## 2021-07-19 PROCEDURE — 77065 DX MAMMO INCL CAD UNI: CPT | Performed by: INTERNAL MEDICINE

## 2021-07-19 PROCEDURE — 77061 BREAST TOMOSYNTHESIS UNI: CPT | Performed by: INTERNAL MEDICINE

## 2021-07-19 PROCEDURE — 76642 ULTRASOUND BREAST LIMITED: CPT | Performed by: INTERNAL MEDICINE

## 2021-07-19 NOTE — IMAGING NOTE
This nurse introduced self and role of breast coordinator. Discussed recommended breast biopsy with patient. Pt was recommended by Dr. Grace Rutherford to have a  Right breast and right axillary lymphnodeultrasound guided biopsy.  She is  spouses name is D RECOMMENDATIONS:   ULTRASOUND-GUIDED CORE BIOPSY: RIGHT BREAST              Dictated by (CST): Tylor Mitchell DO on 7/19/2021 at 11:30 AM       Finalized by (CST): [facility] DO Jm on 7/19/2021 at 12:39 PM               Reviewed pertinent pavithra the area. This may burn and sting for several seconds . Then use a needle to collect cells or tissue from the site. A marker, or clip, will then be placed in the biopsied area.   This marker is placed so this biopsy site is able to be accurately located u

## 2021-07-19 NOTE — TELEPHONE ENCOUNTER
Biopsy Order Faxed to Radiology dept. And also patient notified Referral for Anisa Glass has been generated.

## 2021-07-19 NOTE — TELEPHONE ENCOUNTER
Chelle calling from Breast center , faxed over results to [facility] office  Mammogram is very dunia[icious, states has been communicating with   via Secure chat      Harjit Rowe asking can if the referral for this patient can be expedited quickly to have her bi

## 2021-07-20 ENCOUNTER — OFFICE VISIT (OUTPATIENT)
Dept: OTOLARYNGOLOGY | Facility: CLINIC | Age: 56
End: 2021-07-20

## 2021-07-20 ENCOUNTER — OFFICE VISIT (OUTPATIENT)
Dept: AUDIOLOGY | Facility: CLINIC | Age: 56
End: 2021-07-20

## 2021-07-20 VITALS
DIASTOLIC BLOOD PRESSURE: 78 MMHG | TEMPERATURE: 99 F | SYSTOLIC BLOOD PRESSURE: 141 MMHG | HEIGHT: 61 IN | BODY MASS INDEX: 43.43 KG/M2 | WEIGHT: 230 LBS

## 2021-07-20 DIAGNOSIS — H93.13 TINNITUS, BILATERAL: Primary | ICD-10-CM

## 2021-07-20 DIAGNOSIS — H93.13 TINNITUS OF BOTH EARS: Primary | ICD-10-CM

## 2021-07-20 PROCEDURE — 92567 TYMPANOMETRY: CPT | Performed by: AUDIOLOGIST

## 2021-07-20 PROCEDURE — 3078F DIAST BP <80 MM HG: CPT | Performed by: OTOLARYNGOLOGY

## 2021-07-20 PROCEDURE — 3077F SYST BP >= 140 MM HG: CPT | Performed by: OTOLARYNGOLOGY

## 2021-07-20 PROCEDURE — 3008F BODY MASS INDEX DOCD: CPT | Performed by: OTOLARYNGOLOGY

## 2021-07-20 PROCEDURE — 99243 OFF/OP CNSLTJ NEW/EST LOW 30: CPT | Performed by: OTOLARYNGOLOGY

## 2021-07-20 PROCEDURE — 92557 COMPREHENSIVE HEARING TEST: CPT | Performed by: AUDIOLOGIST

## 2021-07-20 NOTE — PROGRESS NOTES
Hudson Aguirre is a 60year old female. Patient presents with:  Ear Problem: pt presents today due to tinnitus on both ears. HISTORY OF PRESENT ILLNESS  She presents with a history of tinnitus bilaterally.   Previous audiogram performed in 2014 demonstra Negative Abdominal pain and diarrhea. Endocrine Negative Cold intolerance and heat intolerance. Neuro Negative Tremors. Psych Negative Anxiety and depression. Integumentary Negative Frequent skin infections, pigment change and rash.    Hema/Lymph Ne Take 1 tablet (150 mcg total) by mouth before breakfast., Disp: 90 tablet, Rfl: 1  ASSESSMENT AND PLAN    1. Tinnitus of both ears  Audiogram reveals normal hearing. She denies any other associated signs or symptoms.   We did discuss masking techniques to

## 2021-07-20 NOTE — PROGRESS NOTES
AUDIOLOGY REPORT      Patricia Quintana is a 60year old female     Referring Provider: Naeem Iglesias   YOB: 1965  Medical Record: [id number]      Patient Hearing History:  Patient was seen previously at this office in 2014.    She reported tinnitu

## 2021-07-22 ENCOUNTER — HOSPITAL ENCOUNTER (OUTPATIENT)
Dept: MAMMOGRAPHY | Facility: HOSPITAL | Age: 56
Discharge: HOME | End: 2021-07-22
Attending: INTERNAL MEDICINE
Payer: COMMERCIAL

## 2021-07-22 ENCOUNTER — HOSPITAL ENCOUNTER (OUTPATIENT)
Dept: ULTRASOUND IMAGING | Facility: HOSPITAL | Age: 56
Discharge: HOME | End: 2021-07-22
Attending: INTERNAL MEDICINE
Payer: COMMERCIAL

## 2021-07-22 DIAGNOSIS — N63.0 BREAST MASS: ICD-10-CM

## 2021-07-22 PROCEDURE — 88342 IMHCHEM/IMCYTCHM 1ST ANTB: CPT | Performed by: INTERNAL MEDICINE

## 2021-07-22 PROCEDURE — 88305 TISSUE EXAM BY PATHOLOGIST: CPT | Performed by: INTERNAL MEDICINE

## 2021-07-22 PROCEDURE — 76942 ECHO GUIDE FOR BIOPSY: CPT | Performed by: INTERNAL MEDICINE

## 2021-07-22 PROCEDURE — 77065 DX MAMMO INCL CAD UNI: CPT | Performed by: INTERNAL MEDICINE

## 2021-07-22 PROCEDURE — 38505 NEEDLE BIOPSY LYMPH NODES: CPT | Performed by: INTERNAL MEDICINE

## 2021-07-22 PROCEDURE — 88360 TUMOR IMMUNOHISTOCHEM/MANUAL: CPT | Performed by: INTERNAL MEDICINE

## 2021-07-22 PROCEDURE — 19083 BX BREAST 1ST LESION US IMAG: CPT | Performed by: INTERNAL MEDICINE

## 2021-07-22 NOTE — PROCEDURES
[facility] HOSP - [facility]  Procedure Note    Casimiro Ingram Patient Status:  Outpatient    1965 MRN [id number]   Location [address] Attending Jonelle Salcido MD   Hosp Day # 0 PCP Lucio Hidalgo MD     P

## 2021-07-26 ENCOUNTER — TELEPHONE (OUTPATIENT)
Dept: HEMATOLOGY/ONCOLOGY | Facility: HOSPITAL | Age: 56
End: 2021-07-26

## 2021-07-26 NOTE — TELEPHONE ENCOUNTER
Patient is s/p right breast US guided biopsy, performed 7/22/21. Pathology indicates malignancy. Phoned patient and introduced myself to patient as Breast RN Navigator. Patient shares she has seen her pathology results in her MyChart.     Emotional suppo

## 2021-07-27 ENCOUNTER — TELEPHONE (OUTPATIENT)
Dept: CASE MANAGEMENT | Age: 56
End: 2021-07-27

## 2021-07-27 DIAGNOSIS — G47.33 OBSTRUCTIVE SLEEP APNEA: Primary | ICD-10-CM

## 2021-07-28 ENCOUNTER — OFFICE VISIT (OUTPATIENT)
Dept: SURGERY | Facility: CLINIC | Age: 56
End: 2021-07-28

## 2021-07-28 VITALS
HEIGHT: 61 IN | OXYGEN SATURATION: 99 % | TEMPERATURE: 99 F | RESPIRATION RATE: 18 BRPM | BODY MASS INDEX: 43.92 KG/M2 | HEART RATE: 82 BPM | WEIGHT: 232.63 LBS | SYSTOLIC BLOOD PRESSURE: 137 MMHG | DIASTOLIC BLOOD PRESSURE: 70 MMHG

## 2021-07-28 DIAGNOSIS — C50.411 MALIGNANT NEOPLASM OF UPPER-OUTER QUADRANT OF RIGHT BREAST IN FEMALE, ESTROGEN RECEPTOR POSITIVE (HCC): Primary | ICD-10-CM

## 2021-07-28 DIAGNOSIS — Z17.0 MALIGNANT NEOPLASM OF UPPER-OUTER QUADRANT OF RIGHT BREAST IN FEMALE, ESTROGEN RECEPTOR POSITIVE (HCC): Primary | ICD-10-CM

## 2021-07-28 PROCEDURE — 3075F SYST BP GE 130 - 139MM HG: CPT | Performed by: SURGERY

## 2021-07-28 PROCEDURE — 99245 OFF/OP CONSLTJ NEW/EST HI 55: CPT | Performed by: SURGERY

## 2021-07-28 PROCEDURE — 3008F BODY MASS INDEX DOCD: CPT | Performed by: SURGERY

## 2021-07-28 PROCEDURE — 3078F DIAST BP <80 MM HG: CPT | Performed by: SURGERY

## 2021-07-28 NOTE — PROGRESS NOTES
Breast Surgery New Patient Consultation    This is the first visit for this 60year old woman, referred by Dr. Ady Wilkins, who presents for evaluation of right breast cancer.     History of Present Illness:   Ms. Ky Gibson is a 60year old woman who pr replacement therapy. She has history of oral contraceptive use for 3-4 years, last in 1987. She denies infertility treatment to achieve pregnancy.     Medications:    No outpatient medications have been marked as taking for the 7/28/21 encounter (Office V swallowing, reflux symptoms, vomiting, dark or bloody stools, constipation, yellowing of the skin, indigestion, nausea, change in bowel habits, diarrhea, abdominal pain or vomiting blood.      Genitourinary:  The patient denies frequent urination, needing t are no palpable masses. The trachea is in the midline. Conjunctiva are clear, non-icteric. Chest: The chest expands symmetrically. The lungs are clear to auscultation. Heart: The rhythm is regular. There are no murmurs, rubs, gallops or thrills. local and systemic disease and local and systemic therapy.  For local treatment options, I explained the risks and benefits of breast conservation and mastectomy (with or without reconstruction), including the fact that survival rates are equal with these t

## 2021-07-28 NOTE — PATIENT INSTRUCTIONS
Dr. Haily Ashton  Tel: [phone]  Fax: [phone]  [address] [facility], [place] [address]  [phone]     Surgery/Procedure: Right breast wire localized lumpectomy, wire localized right axillary lymph node, right directions on where to go. They begin making calls after 2pm, if you are not contacted by 4pm, please call the surgeon's office listed above. 10. Do not take any blood thinners at least one week prior to the procedure/surgery.  This includes aspirin, baby

## 2021-07-29 ENCOUNTER — TELEPHONE (OUTPATIENT)
Dept: SURGERY | Facility: CLINIC | Age: 56
End: 2021-07-29

## 2021-07-29 DIAGNOSIS — C50.911 INVASIVE DUCTAL CARCINOMA OF RIGHT BREAST (HCC): Primary | ICD-10-CM

## 2021-07-29 DIAGNOSIS — C50.911 INVASIVE DUCTAL CARCINOMA OF BREAST, FEMALE, RIGHT (HCC): Primary | ICD-10-CM

## 2021-07-29 RX ORDER — MULTIVIT-MIN/IRON FUM/FOLIC AC 7.5 MG-4
1 TABLET ORAL DAILY
COMMUNITY

## 2021-07-29 NOTE — TELEPHONE ENCOUNTER
Calling pt in regards to scheduling surgery. Informed pt that I have 08/02/21 available at [facility] with Dr. Blanchard Me. Pt verbalized understanding and in agreement with date. Encouraged pt to call our office with any other questions or concerns.

## 2021-07-30 ENCOUNTER — OFFICE VISIT (OUTPATIENT)
Dept: PHYSICAL THERAPY | Facility: HOSPITAL | Age: 56
End: 2021-07-30
Attending: SURGERY
Payer: COMMERCIAL

## 2021-07-30 ENCOUNTER — LAB ENCOUNTER (OUTPATIENT)
Dept: LAB | Facility: HOSPITAL | Age: 56
End: 2021-07-30
Attending: SURGERY
Payer: COMMERCIAL

## 2021-07-30 DIAGNOSIS — Z01.818 PREOP TESTING: ICD-10-CM

## 2021-07-30 DIAGNOSIS — C50.911 INVASIVE DUCTAL CARCINOMA OF RIGHT BREAST (HCC): ICD-10-CM

## 2021-07-30 NOTE — PROGRESS NOTES
BREAST CANCER SURGICAL SCREENINGS  [facility]      PATIENT SUMMARY:     Involved Side: Right                                                     Dominant Hand:  Left                           Occupation:    Work in lunch room IR     IR     Sitting flex 165 165  Sitting flex    Sitting flex      abd 160 160   abd     abd      ext wfl wfl   ext     ext     Functional IR  wfl wfl  Functional IR     Functional IR                      Shoulder strength  Right Left  Shoulder streng 37. 3   MEASUREMENT G 42.8   MEASUREMENT H 43.7   MEASUREMENT I 44.2    TOTAL VOLUME  [phone]   % DIFFERENCE -[phone]

## 2021-07-31 LAB — SARS-COV-2 RNA RESP QL NAA+PROBE: NOT DETECTED

## 2021-08-02 ENCOUNTER — HOSPITAL ENCOUNTER (OUTPATIENT)
Dept: NUCLEAR MEDICINE | Facility: HOSPITAL | Age: 56
Discharge: HOME | End: 2021-08-02
Attending: SURGERY
Payer: COMMERCIAL

## 2021-08-02 ENCOUNTER — APPOINTMENT (OUTPATIENT)
Dept: MAMMOGRAPHY | Facility: HOSPITAL | Age: 56
End: 2021-08-02
Attending: SURGERY
Payer: COMMERCIAL

## 2021-08-02 ENCOUNTER — HOSPITAL ENCOUNTER (OUTPATIENT)
Dept: MAMMOGRAPHY | Facility: HOSPITAL | Age: 56
Discharge: HOME | End: 2021-08-02
Attending: SURGERY
Payer: COMMERCIAL

## 2021-08-02 ENCOUNTER — ANESTHESIA (OUTPATIENT)
Dept: SURGERY | Facility: HOSPITAL | Age: 56
End: 2021-08-02
Payer: COMMERCIAL

## 2021-08-02 ENCOUNTER — HOSPITAL ENCOUNTER (OUTPATIENT)
Dept: ULTRASOUND IMAGING | Facility: HOSPITAL | Age: 56
Discharge: HOME | End: 2021-08-02
Attending: SURGERY
Payer: COMMERCIAL

## 2021-08-02 ENCOUNTER — HOSPITAL ENCOUNTER (OUTPATIENT)
Facility: HOSPITAL | Age: 56
Setting detail: HOSPITAL OUTPATIENT SURGERY
Discharge: HOME | End: 2021-08-02
Attending: SURGERY | Admitting: SURGERY
Payer: COMMERCIAL

## 2021-08-02 ENCOUNTER — ANESTHESIA EVENT (OUTPATIENT)
Dept: SURGERY | Facility: HOSPITAL | Age: 56
End: 2021-08-02
Payer: COMMERCIAL

## 2021-08-02 VITALS
SYSTOLIC BLOOD PRESSURE: 125 MMHG | OXYGEN SATURATION: 99 % | HEIGHT: 61 IN | TEMPERATURE: 98 F | BODY MASS INDEX: 44.93 KG/M2 | HEART RATE: 93 BPM | WEIGHT: 238 LBS | RESPIRATION RATE: 18 BRPM | DIASTOLIC BLOOD PRESSURE: 76 MMHG

## 2021-08-02 DIAGNOSIS — C50.911 INVASIVE DUCTAL CARCINOMA OF BREAST, FEMALE, RIGHT (HCC): ICD-10-CM

## 2021-08-02 DIAGNOSIS — Z01.818 PREOP TESTING: ICD-10-CM

## 2021-08-02 DIAGNOSIS — C50.911 INVASIVE DUCTAL CARCINOMA OF BREAST, FEMALE, RIGHT (HCC): Primary | ICD-10-CM

## 2021-08-02 DIAGNOSIS — R92.8 ABNORMAL MAMMOGRAM: ICD-10-CM

## 2021-08-02 PROCEDURE — 78195 LYMPH SYSTEM IMAGING: CPT | Performed by: SURGERY

## 2021-08-02 PROCEDURE — 19285 PERQ DEV BREAST 1ST US IMAG: CPT | Performed by: SURGERY

## 2021-08-02 PROCEDURE — 76098 X-RAY EXAM SURGICAL SPECIMEN: CPT | Performed by: SURGERY

## 2021-08-02 PROCEDURE — 88300 SURGICAL PATH GROSS: CPT | Performed by: SURGERY

## 2021-08-02 PROCEDURE — 0HBT0ZZ EXCISION OF RIGHT BREAST, OPEN APPROACH: ICD-10-PCS | Performed by: SURGERY

## 2021-08-02 PROCEDURE — 88360 TUMOR IMMUNOHISTOCHEM/MANUAL: CPT | Performed by: SURGERY

## 2021-08-02 PROCEDURE — 88307 TISSUE EXAM BY PATHOLOGIST: CPT | Performed by: SURGERY

## 2021-08-02 PROCEDURE — 88363 XM ARCHIVE TISSUE MOLEC ANAL: CPT | Performed by: SURGERY

## 2021-08-02 PROCEDURE — 77065 DX MAMMO INCL CAD UNI: CPT | Performed by: SURGERY

## 2021-08-02 PROCEDURE — 88341 IMHCHEM/IMCYTCHM EA ADD ANTB: CPT | Performed by: SURGERY

## 2021-08-02 PROCEDURE — 88342 IMHCHEM/IMCYTCHM 1ST ANTB: CPT | Performed by: SURGERY

## 2021-08-02 PROCEDURE — 07B50ZX EXCISION OF RIGHT AXILLARY LYMPHATIC, OPEN APPROACH, DIAGNOSTIC: ICD-10-PCS | Performed by: SURGERY

## 2021-08-02 RX ORDER — SODIUM CHLORIDE, SODIUM LACTATE, POTASSIUM CHLORIDE, CALCIUM CHLORIDE 600; 310; 30; 20 MG/100ML; MG/100ML; MG/100ML; MG/100ML
INJECTION, SOLUTION INTRAVENOUS CONTINUOUS
Status: DISCONTINUED | OUTPATIENT
Start: 2021-08-02 | End: 2021-08-02

## 2021-08-02 RX ORDER — LIDOCAINE HYDROCHLORIDE AND EPINEPHRINE 10; 10 MG/ML; UG/ML
INJECTION, SOLUTION INFILTRATION; PERINEURAL AS NEEDED
Status: DISCONTINUED | OUTPATIENT
Start: 2021-08-02 | End: 2021-08-02 | Stop reason: HOSPADM

## 2021-08-02 RX ORDER — BUPIVACAINE HYDROCHLORIDE 5 MG/ML
INJECTION, SOLUTION EPIDURAL; INTRACAUDAL AS NEEDED
Status: DISCONTINUED | OUTPATIENT
Start: 2021-08-02 | End: 2021-08-02 | Stop reason: HOSPADM

## 2021-08-02 RX ORDER — FAMOTIDINE 20 MG/1
20 TABLET ORAL ONCE
Status: COMPLETED | OUTPATIENT
Start: 2021-08-02 | End: 2021-08-02

## 2021-08-02 RX ORDER — ONDANSETRON 2 MG/ML
4 INJECTION INTRAMUSCULAR; INTRAVENOUS ONCE AS NEEDED
Status: DISCONTINUED | OUTPATIENT
Start: 2021-08-02 | End: 2021-08-02

## 2021-08-02 RX ORDER — HYDROMORPHONE HYDROCHLORIDE 1 MG/ML
0.2 INJECTION, SOLUTION INTRAMUSCULAR; INTRAVENOUS; SUBCUTANEOUS EVERY 5 MIN PRN
Status: DISCONTINUED | OUTPATIENT
Start: 2021-08-02 | End: 2021-08-02

## 2021-08-02 RX ORDER — DEXAMETHASONE SODIUM PHOSPHATE 4 MG/ML
VIAL (ML) INJECTION AS NEEDED
Status: DISCONTINUED | OUTPATIENT
Start: 2021-08-02 | End: 2021-08-02 | Stop reason: SURG

## 2021-08-02 RX ORDER — MORPHINE SULFATE 10 MG/ML
6 INJECTION, SOLUTION INTRAMUSCULAR; INTRAVENOUS EVERY 10 MIN PRN
Status: DISCONTINUED | OUTPATIENT
Start: 2021-08-02 | End: 2021-08-02

## 2021-08-02 RX ORDER — HYDROCODONE BITARTRATE AND ACETAMINOPHEN 5; 325 MG/1; MG/1
2 TABLET ORAL AS NEEDED
Status: DISCONTINUED | OUTPATIENT
Start: 2021-08-02 | End: 2021-08-02

## 2021-08-02 RX ORDER — MORPHINE SULFATE 4 MG/ML
4 INJECTION, SOLUTION INTRAMUSCULAR; INTRAVENOUS EVERY 10 MIN PRN
Status: DISCONTINUED | OUTPATIENT
Start: 2021-08-02 | End: 2021-08-02

## 2021-08-02 RX ORDER — KETOROLAC TROMETHAMINE 30 MG/ML
INJECTION, SOLUTION INTRAMUSCULAR; INTRAVENOUS AS NEEDED
Status: DISCONTINUED | OUTPATIENT
Start: 2021-08-02 | End: 2021-08-02 | Stop reason: SURG

## 2021-08-02 RX ORDER — HALOPERIDOL 5 MG/ML
0.25 INJECTION INTRAMUSCULAR ONCE AS NEEDED
Status: DISCONTINUED | OUTPATIENT
Start: 2021-08-02 | End: 2021-08-02

## 2021-08-02 RX ORDER — NALOXONE HYDROCHLORIDE 0.4 MG/ML
80 INJECTION, SOLUTION INTRAMUSCULAR; INTRAVENOUS; SUBCUTANEOUS AS NEEDED
Status: DISCONTINUED | OUTPATIENT
Start: 2021-08-02 | End: 2021-08-02

## 2021-08-02 RX ORDER — MORPHINE SULFATE 4 MG/ML
2 INJECTION, SOLUTION INTRAMUSCULAR; INTRAVENOUS EVERY 10 MIN PRN
Status: DISCONTINUED | OUTPATIENT
Start: 2021-08-02 | End: 2021-08-02

## 2021-08-02 RX ORDER — ONDANSETRON 2 MG/ML
INJECTION INTRAMUSCULAR; INTRAVENOUS AS NEEDED
Status: DISCONTINUED | OUTPATIENT
Start: 2021-08-02 | End: 2021-08-02 | Stop reason: SURG

## 2021-08-02 RX ORDER — CEFAZOLIN SODIUM/WATER 2 G/20 ML
2 SYRINGE (ML) INTRAVENOUS ONCE
Status: COMPLETED | OUTPATIENT
Start: 2021-08-02 | End: 2021-08-02

## 2021-08-02 RX ORDER — HYDROMORPHONE HYDROCHLORIDE 1 MG/ML
0.6 INJECTION, SOLUTION INTRAMUSCULAR; INTRAVENOUS; SUBCUTANEOUS EVERY 5 MIN PRN
Status: DISCONTINUED | OUTPATIENT
Start: 2021-08-02 | End: 2021-08-02

## 2021-08-02 RX ORDER — PROCHLORPERAZINE EDISYLATE 5 MG/ML
5 INJECTION INTRAMUSCULAR; INTRAVENOUS ONCE AS NEEDED
Status: DISCONTINUED | OUTPATIENT
Start: 2021-08-02 | End: 2021-08-02

## 2021-08-02 RX ORDER — HYDROCODONE BITARTRATE AND ACETAMINOPHEN 5; 325 MG/1; MG/1
1 TABLET ORAL AS NEEDED
Status: DISCONTINUED | OUTPATIENT
Start: 2021-08-02 | End: 2021-08-02

## 2021-08-02 RX ORDER — GLYCOPYRROLATE 0.2 MG/ML
INJECTION, SOLUTION INTRAMUSCULAR; INTRAVENOUS AS NEEDED
Status: DISCONTINUED | OUTPATIENT
Start: 2021-08-02 | End: 2021-08-02 | Stop reason: SURG

## 2021-08-02 RX ORDER — ACETAMINOPHEN 500 MG
1000 TABLET ORAL ONCE
Status: COMPLETED | OUTPATIENT
Start: 2021-08-02 | End: 2021-08-02

## 2021-08-02 RX ORDER — HYDROMORPHONE HYDROCHLORIDE 1 MG/ML
0.4 INJECTION, SOLUTION INTRAMUSCULAR; INTRAVENOUS; SUBCUTANEOUS EVERY 5 MIN PRN
Status: DISCONTINUED | OUTPATIENT
Start: 2021-08-02 | End: 2021-08-02

## 2021-08-02 RX ORDER — METOCLOPRAMIDE 10 MG/1
10 TABLET ORAL ONCE
Status: COMPLETED | OUTPATIENT
Start: 2021-08-02 | End: 2021-08-02

## 2021-08-02 RX ORDER — HYDROCODONE BITARTRATE AND ACETAMINOPHEN 5; 325 MG/1; MG/1
1-2 TABLET ORAL EVERY 6 HOURS PRN
Qty: 20 TABLET | Refills: 0 | Status: SHIPPED | OUTPATIENT
Start: 2021-08-02 | End: 2021-09-08

## 2021-08-02 RX ADMIN — KETOROLAC TROMETHAMINE 30 MG: 30 INJECTION, SOLUTION INTRAMUSCULAR; INTRAVENOUS at 12:51:00

## 2021-08-02 RX ADMIN — CEFAZOLIN SODIUM/WATER 2 G: 2 G/20 ML SYRINGE (ML) INTRAVENOUS at 11:28:00

## 2021-08-02 RX ADMIN — GLYCOPYRROLATE 0.2 MG: 0.2 INJECTION, SOLUTION INTRAMUSCULAR; INTRAVENOUS at 11:29:00

## 2021-08-02 RX ADMIN — DEXAMETHASONE SODIUM PHOSPHATE 4 MG: 4 MG/ML VIAL (ML) INJECTION at 11:29:00

## 2021-08-02 RX ADMIN — SODIUM CHLORIDE, SODIUM LACTATE, POTASSIUM CHLORIDE, CALCIUM CHLORIDE: 600; 310; 30; 20 INJECTION, SOLUTION INTRAVENOUS at 11:28:00

## 2021-08-02 RX ADMIN — ONDANSETRON 4 MG: 2 INJECTION INTRAMUSCULAR; INTRAVENOUS at 11:29:00

## 2021-08-02 NOTE — IMAGING NOTE
0855 Pt  to ultrasound department scouts completed by [facility],  tech      9435 Hx taken procedure explained questions answered. Order verified and initialed by all staff members .      1205 Consent signed and verified      9611 Dr Denise Chua here scanning c

## 2021-08-02 NOTE — PROCEDURES
[facility] HOSP - [facility]  Procedure Note    Palomarita Salcido Patient Status:  Outpatient    1965 MRN [id number]   [place] [address] Attending Marti Rahman MD   Hosp Day # 0 PCP Loretta Moore MD     Procedure: Carolynn Gee

## 2021-08-02 NOTE — ANESTHESIA POSTPROCEDURE EVALUATION
Patient:  Stan Singletary    Procedure Summary     Date: 08/02/21 Room / Location: [facility] OR 16 / [facility] OR    Anesthesia Start: 1126 Anesthesia Stop:     Procedure: right breast wire localized lumpectomy, wire localized right axillary lymph node, rig

## 2021-08-02 NOTE — H&P
History of Present Illness:   Ms. Cem Lynn is a 60year old woman who presents with image detected right breast cancer. The patient denies any palpable masses, nipple discharge, skin changes or axillary symptoms.   She has no personal prior histor pregnancy.     Medications:    No outpatient medications have been marked as taking for the 7/28/21 encounter (Office Visit) with Jeremiah Delgado MD.        Allergies:    No Known Allergies     Family History:         Family History   Problem Relation A nausea, change in bowel habits, diarrhea, abdominal pain or vomiting blood.      Genitourinary:  The patient denies frequent urination, needing to get up at night to urinate, urinary hesitancy or retaining urine, painful urination, urinary incontinence, de The chest expands symmetrically. The lungs are clear to auscultation.     Heart: The rhythm is regular. There are no murmurs, rubs, gallops or thrills.     Breasts:  Her breasts are symmetrical with a cup size DD.   Right breast: The skin, nipple ,and areo explained the risks and benefits of breast conservation and mastectomy (with or without reconstruction), including the fact that survival rates are equal with these two approaches.  If breast conservation is elected, I explained the need for free margins, t and side effects of this procedure; the likelihood of the patient achieving goals; and potential problems that might occur during recuperation.   I discussed reasonable alternatives to the procedure, including risks, benefits and side effects related to the

## 2021-08-02 NOTE — BRIEF OP NOTE
Pre-Operative Diagnosis: Invasive ductal carcinoma of breast, female, right ([facility] [address] [C50.911]     Post-Operative Diagnosis: Invasive ductal carcinoma of breast, female, right [facility]) [C50.911]      Procedure Performed:   right breast wire localized lumpectomy,

## 2021-08-02 NOTE — ANESTHESIA PROCEDURE NOTES
Airway  Date/Time: 8/2/2021 11:30 AM  Urgency: elective      General Information and Staff    Patient location during procedure: OR  Anesthesiologist: Tanisha Nicole MD  Performed: anesthesiologist     Indications and Patient Condition  Indications f

## 2021-08-02 NOTE — ANESTHESIA PREPROCEDURE EVALUATION
Anesthesia PreOp Note    HPI:     Carlos Nichols is a 60year old female who presents for preoperative consultation requested by: Tri Herrera MD    Date of Surgery: 8/2/2021    Procedure(s):  right breast wire localized lumpectomy, wire localized Rate: 20 mL/hr at 08/02/21 0813, New Bag at 08/02/21 0813  ceFAZolin sodium (ANCEF/KEFZOL) 2 GM/20ML premix IV syringe 2 g, 2 g, Intravenous, Once, Ash Soto MD    No current [facility]-ordered outpatient medications on file.       No Known Allergies Physical Activity:       Days of Exercise per Week:       Minutes of Exercise per Session:   Stress:       Feeling of Stress :   Social Connections:       Frequency of Communication with Friends and Family:       Frequency of Social Gatherings with Jyothi Rees [facility]  Airway:  LMA  Post-op Pain Management: IV analgesics, Local and Oral pain medication  Informed Consent Plan and Risks Discussed With:  Patient  Discussed plan with:  Surgeon      I have informed Lilliam Guzman and/or legal guardian or family m

## 2021-08-03 NOTE — OPERATIVE REPORT
[facility]    PATIENT'S NAME: Shyanne Diaz   ATTENDING PHYSICIAN: Jose Leo MD   OPERATING PHYSICIAN: Jose Cameron.  Angelito Leo MD   PATIENT ACCOUNT#:   [id number]    LOCATION:  [facility] 10  MEDICAL RECORD #:   [id number] affected lymph node was recommended. OPERATIVE TECHNIQUE:  The patient was brought to the imaging suite.   She underwent a wire localization of the previously abnormal looking right axillary lymph node as well as the area of concern in the right breast. clip was identified during the dissection. The wound was irrigated. Hemostasis assured with electrocautery and hemoclips. It was closed using interrupted 3-0 Vicryl for deep layer and a running 4-0 subcuticular Monocryl for skin.   Mastisol and Steri-Str then mobilized into the aforementioned defect and secured to the level of the pectoralis fascia with a running 3-0 PDS suture. Her wound was then irrigated again. Hemostasis assured with electrocautery.   It was closed using interrupted 3-0 Vicryl for danny

## 2021-08-04 ENCOUNTER — TELEPHONE (OUTPATIENT)
Dept: SURGERY | Facility: CLINIC | Age: 56
End: 2021-08-04

## 2021-08-04 NOTE — TELEPHONE ENCOUNTER
Spoke with patient regarding pathology results. Plan for Oncotype Dx to determine systemic treatment plan and patient will meet with Dr. Marco A Cheek of medical oncology next week to determine next steps. within normal limits

## 2021-08-07 RX ORDER — LEVOTHYROXINE SODIUM 175 UG/1
TABLET ORAL
Qty: 90 TABLET | Refills: 0 | Status: SHIPPED | OUTPATIENT
Start: 2021-08-07 | End: 2021-11-03

## 2021-08-11 ENCOUNTER — OFFICE VISIT (OUTPATIENT)
Dept: HEMATOLOGY/ONCOLOGY | Facility: HOSPITAL | Age: 56
End: 2021-08-11
Attending: INTERNAL MEDICINE
Payer: COMMERCIAL

## 2021-08-11 VITALS
HEIGHT: 61 IN | SYSTOLIC BLOOD PRESSURE: 142 MMHG | WEIGHT: 235 LBS | BODY MASS INDEX: 44.37 KG/M2 | TEMPERATURE: 99 F | RESPIRATION RATE: 16 BRPM | HEART RATE: 89 BPM | DIASTOLIC BLOOD PRESSURE: 60 MMHG | OXYGEN SATURATION: 98 %

## 2021-08-11 DIAGNOSIS — C50.411 MALIGNANT NEOPLASM OF UPPER-OUTER QUADRANT OF RIGHT BREAST IN FEMALE, ESTROGEN RECEPTOR POSITIVE (HCC): Primary | ICD-10-CM

## 2021-08-11 DIAGNOSIS — Z17.0 MALIGNANT NEOPLASM OF UPPER-OUTER QUADRANT OF RIGHT BREAST IN FEMALE, ESTROGEN RECEPTOR POSITIVE (HCC): Primary | ICD-10-CM

## 2021-08-11 PROCEDURE — 99245 OFF/OP CONSLTJ NEW/EST HI 55: CPT | Performed by: INTERNAL MEDICINE

## 2021-08-11 NOTE — CONSULTS
FAIZAN Priest Se is a 60year old female here at the request of Merilynn Apley, MD for evaluation of Malignant neoplasm of upper-outer quadrant of right breast in female, estrogen receptor positive (hcc)  (primary encounter diagnosis)    Patient cancer:  Pt is a   Pt was 27years old at time of first pregnancy. She has cumulative breastfeeding history of 40 months. She achieved menarche at age 4.   Age of Menopause: 37  Type: natural menopause  She denies any history of hormone replacement uterine bleeding 4/23/17    Last occurence   • Amenorrhea    • Cancer [facility])     right breast   • Disorder of thyroid    • Dyspareunia    • Hypothyroidism    • Morbid obesity ([address]    • KIRTI (obstructive sleep apnea)    • Sleep apnea    • Visual impairment of right breast in female, estrogen receptor positive ([facility] [address]  Staging form: Breast, AJCC 8th Edition  - Pathologic stage from 8/11/2021: Stage IIA (pT2, pN1a(sn), cM0, G3, ER+, AL+, HER2-) - Signed by Noe Scott MD on 8/11/2021    Patient completed lum Report   Surgical Pathology                                Case: [id number]                                   Authorizing Provider:  Ariel Odell MD        Collected:           07/22/2021 07:23 AM           Ordering Location:     [facility] (Negative)  Ki-67 (Leica, monoclonal, clone MM1) status:  4% (Favorable)        ASCO/CAP Scoring Criteria:  ER/PgR:  > 1% (Positive), Intensity of staining (weak, moderate, strong)                  < 1% (Negative)      HER-2/jacob:  0 (Negative):   No stainin for metastatic carcinoma. PATHOLOGY RESULTS OF THE RIGHT BREAST ARE CONSIDERED CONCORDANT. PATHOLOGY RESULTS OF THE RIGHT AXILLARY LYMPH NODE ARE CONSIDERED DISCORDANT. SURGICAL AND ONCOLOGIC CONSULTS ARE RECOMMENDED FOR FURTHER EVALUATION.      [facility],  BREAST RIGHT LIMITED (PVS=[id number]), 7/19/2021, 11:52 AM.  [facility], [facility], [facility] CHELA 2D+3D DIAGNOSTIC ADDL VWS  RIGHT (CVM=[id number]), 7/19/2021, 11:09 AM.  [address] [place] [address] Dr Ahn, [facility] CHELA 2D+3D SCREENING   BI condition with post biopsy aftercare instructions. FINDINGS:          Site #1:  LOCATION, SPECIMEN #:         11:00 o'clock right breast 7 centimeters from the nipple; 6 cores  BIOPSY NEEDLE:          14 gauge Bard marquee.    MARKERS(S) PLACED: (IRW=[id number]), 7/19/2021, 11:52 AM.  [facility], [facility] PF DIGITAL SCREEN W CAD, 11/23/2011, 10:08 AM.  [facility], [facility]. [facility], [facility] DIGITAL SCREEN W    CAD PF, 3/12/2016, 8:53 AM.  [address] [place] [address] Dr Ahn, [facility] SCREENING B target due date for the patient's next mammogram has been entered into a reminder system.        Patient received a discharge summary from the technologist after completion of exam.      Breast marker legend used on images    Triangle = Palpable lump   Circ CATEGORY:     DIAGNOSTIC CATEGORY 0--INCOMPLETE ASSESSMENT: NEED ADDITIONAL IMAGING EVALUATION. RECOMMENDATIONS:   ULTRASOUND:  RIGHT BREAST AND BILATERAL AXILLA  --We will call the patient back for an ultrasound and provide an additional report. Specimens:   A) - [place] Lymph node, 1. Right sentinel node #1                                                 B) - Breast, right, 2. Right breast anterior margin, clip marks true margin                          C) - Breast, right, 3. Right breas immunohistochemical positive controls are examined and showed appropriate reactivity. Validation, Performance, reporting,  and proficiency testing regarding the assay is in compliance with the published ASCO-CAP Guideline (2020).      Final cm in greatest dimension. · Ductal carcinoma in situ, high-grade, solid, cribriform, and comedo types. · Invasive carcinoma is present at the superior specimen margin and is less than 1 mm from the posterior specimen margin.   · DCIS is present at the pos invasive focus (Millimeters): 30 mm        Additional Dimension (Millimeters):    22 mm        Additional Dimension (Millimeters):    15 mm      Tumor Focality:    Single focus of invasive carcinoma      Ductal Carcinoma In Situ (DCIS):    Present        : Positive (greater than 10% of cells demonstrate nuclear positivity)          Percentage of Cells with Nuclear Positivity:    95 %      Breast Biomarker Testing Performed on Previous Biopsy:            Progesterone Receptor (PgR) Status:    Positive

## 2021-08-12 ENCOUNTER — NURSE NAVIGATOR ENCOUNTER (OUTPATIENT)
Dept: HEMATOLOGY/ONCOLOGY | Facility: HOSPITAL | Age: 56
End: 2021-08-12

## 2021-08-13 ENCOUNTER — OFFICE VISIT (OUTPATIENT)
Dept: SURGERY | Facility: CLINIC | Age: 56
End: 2021-08-13

## 2021-08-13 VITALS
DIASTOLIC BLOOD PRESSURE: 77 MMHG | HEART RATE: 78 BPM | RESPIRATION RATE: 16 BRPM | OXYGEN SATURATION: 98 % | TEMPERATURE: 98 F | SYSTOLIC BLOOD PRESSURE: 121 MMHG

## 2021-08-13 DIAGNOSIS — C50.411 MALIGNANT NEOPLASM OF UPPER-OUTER QUADRANT OF RIGHT BREAST IN FEMALE, ESTROGEN RECEPTOR POSITIVE (HCC): Primary | ICD-10-CM

## 2021-08-13 DIAGNOSIS — Z17.0 MALIGNANT NEOPLASM OF UPPER-OUTER QUADRANT OF RIGHT BREAST IN FEMALE, ESTROGEN RECEPTOR POSITIVE (HCC): Primary | ICD-10-CM

## 2021-08-13 NOTE — PROGRESS NOTES
Patient presents today for a post-op nurse visit for surgical wound check. Pt had a right breast lumpectomy w/ right sentinel lymph node biopsy on 8/2/2021. Pt has been healing well since surgery.   Pain has been tolerable and pt is not requiring pain med

## 2021-08-16 ENCOUNTER — OFFICE VISIT (OUTPATIENT)
Dept: FAMILY MEDICINE CLINIC | Facility: CLINIC | Age: 56
End: 2021-08-16

## 2021-08-16 VITALS
DIASTOLIC BLOOD PRESSURE: 70 MMHG | TEMPERATURE: 98 F | OXYGEN SATURATION: 96 % | HEIGHT: 61 IN | SYSTOLIC BLOOD PRESSURE: 128 MMHG | RESPIRATION RATE: 17 BRPM | WEIGHT: 230 LBS | HEART RATE: 98 BPM | BODY MASS INDEX: 43.43 KG/M2

## 2021-08-16 DIAGNOSIS — U07.1 COVID-19: ICD-10-CM

## 2021-08-16 DIAGNOSIS — Z20.822 EXPOSURE TO COVID-19 VIRUS: Primary | ICD-10-CM

## 2021-08-16 LAB
OPERATOR ID: ABNORMAL
POCT LOT NUMBER: ABNORMAL
RAPID SARS-COV-2 BY PCR: DETECTED

## 2021-08-16 PROCEDURE — 99213 OFFICE O/P EST LOW 20 MIN: CPT | Performed by: PHYSICIAN ASSISTANT

## 2021-08-16 PROCEDURE — 3078F DIAST BP <80 MM HG: CPT | Performed by: PHYSICIAN ASSISTANT

## 2021-08-16 PROCEDURE — 3074F SYST BP LT 130 MM HG: CPT | Performed by: PHYSICIAN ASSISTANT

## 2021-08-16 PROCEDURE — U0002 COVID-19 LAB TEST NON-CDC: HCPCS | Performed by: PHYSICIAN ASSISTANT

## 2021-08-16 PROCEDURE — 3008F BODY MASS INDEX DOCD: CPT | Performed by: PHYSICIAN ASSISTANT

## 2021-08-16 NOTE — PATIENT INSTRUCTIONS
Coronavirus Disease 2019 (COVID-19): Caring for Yourself or Others   If you or a household member have symptoms of COVID-19, follow these guidelines for preventing spread of the virus, and managing symptoms. If you have COVID-19 symptoms  · Stay home. may need to find other people you have been in contact with. · Follow all instructions the healthcare staff give you. If you have been diagnosed with COVID-19  · Stay home and start self-isolation.  Don’t leave your home unless you need to get medical c breastfeeding people may choose to be vaccinated. Expert groups, including ACOG and the CDC, advise pregnant or breastfeeding people to talk with their healthcare provider about being vaccinated. The vaccines are being rolled out to the public in phases. who have a positive COVID-19 viral test and have mild to moderate symptoms but are not in the hospital. It's not widely available and is still being investigated.  It's approved for people 12 years and older who weigh about 88 pounds (40 kgs) and are at hig sick person. When you can stop self-isolation  When you are sick with COVID-19, you should stay away from other people. This is called self-isolation.    Your limits are different if you've had COVID-19 in the last 3 months but are fully recovered withou after your symptoms first started. Your healthcare provider may want to retest you for COVID-19. Follow your provider's instructions. Mask guidance  Consider the CDC's guidance and your local community's instructions on face masks.      · Cloth masks may call 911:  · Trouble breathing that gets worse  · Pain or pressure in chest that gets worse  · Blue tint to lips or face  · Fast or irregular heartbeat  · Confusion or trouble waking  · Fainting or loss of consciousness  · Coughing up blood  Going home fro

## 2021-08-16 NOTE — PROGRESS NOTES
CHIEF COMPLAINT:   Patient presents with:  Covid: Entered by patient    HPI:   Latasha Butler is a 60year old female who presents for Covid 19 exposure 4 days ago. Exposed while at an eye clinic, was masked.  Denies GI symptoms, respiratory symptoms, cheli chest pain or palpitations   GI: denies N/V/C or abdominal pain  NEURO: Denies headaches    EXAM:   /70   Pulse 98   Temp 98 °F (36.7 °C) (Tympanic)   Resp 17   Ht 5' 1\" (1.549 m)   Wt 230 lb (104.3 kg)   LMP 04/22/2017   SpO2 96%   BMI 43.46 kg/m² symptoms of COVID-19, follow these guidelines for preventing spread of the virus, and managing symptoms. If you have COVID-19 symptoms  · Stay home. Call your healthcare provider and tell them you have symptoms of COVID-19.  Do this before going to any ho staff give you. If you have been diagnosed with COVID-19  · Stay home and start self-isolation. Don’t leave your home unless you need to get medical care. Don't go to work, school, or public areas. Don't use public transportation or taxis.   · Follow all breastfeeding people to talk with their healthcare provider about being vaccinated. The vaccines are being rolled out to the public in phases. Check your local health department about your community's roll-out plans.  The vaccines are given as a shot (inj widely available and is still being investigated. It's approved for people 12 years and older who weigh about 88 pounds (40 kgs) and are at high risk for severe COVID-19 and a hospital stay.  This includes people who are 72 years and older and people with c other people. This is called self-isolation. Your limits are different if you've had COVID-19 in the last 3 months but are fully recovered without symptoms and you have been exposed to someone with COVID-19.  If you are symptom-free, you don't need to sta provider's instructions. Mask guidance  Consider the CDC's guidance and your local community's instructions on face masks. · Cloth masks may help prevent people who have COVID-19 from spreading the virus to others.   · Cloth masks are most likely to r lips or face  · Fast or irregular heartbeat  · Confusion or trouble waking  · Fainting or loss of consciousness  · Coughing up blood  Going home from the hospital   If you were diagnosed with COVID-19 and were recently discharged from the hospital:   · CBS

## 2021-08-17 ENCOUNTER — TELEPHONE (OUTPATIENT)
Dept: SURGERY | Facility: CLINIC | Age: 56
End: 2021-08-17

## 2021-08-20 NOTE — TELEPHONE ENCOUNTER
Calling pt in regards to her recent positive covid test.  Informed pt that we will need to reschedule her visit with Dr. Jeanine Borges. Pt states she is doing well since surgery, with little to no pain and no sxs of infection.   Pt has already met w/ med onc and

## 2021-08-26 ENCOUNTER — VIRTUAL PHONE E/M (OUTPATIENT)
Dept: HEMATOLOGY/ONCOLOGY | Facility: HOSPITAL | Age: 56
End: 2021-08-26
Attending: INTERNAL MEDICINE
Payer: COMMERCIAL

## 2021-08-26 ENCOUNTER — TELEPHONE (OUTPATIENT)
Dept: HEMATOLOGY/ONCOLOGY | Facility: HOSPITAL | Age: 56
End: 2021-08-26

## 2021-08-26 ENCOUNTER — HOSPITAL ENCOUNTER (EMERGENCY)
Facility: HOSPITAL | Age: 56
Discharge: HOME | End: 2021-08-26
Attending: EMERGENCY MEDICINE
Payer: COMMERCIAL

## 2021-08-26 VITALS
OXYGEN SATURATION: 95 % | BODY MASS INDEX: 44.37 KG/M2 | TEMPERATURE: 100 F | DIASTOLIC BLOOD PRESSURE: 69 MMHG | HEIGHT: 61 IN | RESPIRATION RATE: 20 BRPM | HEART RATE: 82 BPM | WEIGHT: 235 LBS | SYSTOLIC BLOOD PRESSURE: 132 MMHG

## 2021-08-26 DIAGNOSIS — Z78.0 POST-MENOPAUSAL: ICD-10-CM

## 2021-08-26 DIAGNOSIS — U07.1 COVID: Primary | ICD-10-CM

## 2021-08-26 DIAGNOSIS — C50.411 MALIGNANT NEOPLASM OF UPPER-OUTER QUADRANT OF RIGHT BREAST IN FEMALE, ESTROGEN RECEPTOR POSITIVE (HCC): Primary | ICD-10-CM

## 2021-08-26 DIAGNOSIS — Z17.0 MALIGNANT NEOPLASM OF UPPER-OUTER QUADRANT OF RIGHT BREAST IN FEMALE, ESTROGEN RECEPTOR POSITIVE (HCC): Primary | ICD-10-CM

## 2021-08-26 PROCEDURE — 99214 OFFICE O/P EST MOD 30 MIN: CPT | Performed by: INTERNAL MEDICINE

## 2021-08-26 PROCEDURE — 99284 EMERGENCY DEPT VISIT MOD MDM: CPT

## 2021-08-26 RX ORDER — ACETAMINOPHEN 500 MG
500 TABLET ORAL EVERY 6 HOURS PRN
COMMUNITY

## 2021-08-26 RX ORDER — ACETAMINOPHEN 500 MG
1000 TABLET ORAL ONCE
Status: COMPLETED | OUTPATIENT
Start: 2021-08-26 | End: 2021-08-26

## 2021-08-26 NOTE — TELEPHONE ENCOUNTER
Patient called and notified that Dr. Humera Augustin called ER and name on list to receive Covid antibody.  Instructed to go to ER this am.

## 2021-08-26 NOTE — ED INITIAL ASSESSMENT (HPI)
Patient arrives with complaint of recently testing positive for covid & oncologist recommended coming to the ED for monoclonal infusion. Pt had lumpectomy on August 2nd for breast CA.

## 2021-08-26 NOTE — PROGRESS NOTES
Virtual/Telephone Check-In    Patricia verbally consents to a Virtual/Telephone Check-In service on 08/26/21. Patient understands and accepts financial responsibility for any deductible, co-insurance and/or co-pays associated with this service. breast   • Disorder of thyroid    • Dyspareunia    • Hypothyroidism    • Morbid obesity ([address]    • KIRTI (obstructive sleep apnea)    • Sleep apnea    • Visual impairment     glasses      Past Surgical History:   Procedure Laterality Date   •    registry outcomes in patients treated without chemotherapy based on recurrence score results in the breast cancer specific survival at 9 years patients with scores of 0-10 is 98.2%.     Patient is postmenopausal.      Discussed with the patient given post-m effort was taken to allow for sufficient and adequate time. This billing was spent on reviewing labs, medications, radiology tests and decision making. Appropriate medical decision-making and tests are ordered as detailed in the plan of care above.

## 2021-08-27 ENCOUNTER — TELEPHONE (OUTPATIENT)
Dept: CASE MANAGEMENT | Age: 56
End: 2021-08-27

## 2021-08-27 NOTE — TELEPHONE ENCOUNTER
Pt received PAB infusion at ED on 8/26 for COVID-19. Please follow-up with pt for post-infusion assessment and home monitoring if needed. Thank you.

## 2021-08-27 NOTE — TELEPHONE ENCOUNTER
Home Monitoring Day 1 of 10.  Patient went to ER 8/26/21 had PAB 8/26 COVID + 8/16/21    What was your temp today? -101.1    How did you take your temp?     with an oral thermometer    What was your pulse ox today?  95%    Are you feeling short of breath to

## 2021-08-27 NOTE — ED PROVIDER NOTES
Patient Seen in: [facility] Emergency Department    History   Patient presents with:  Covid    Stated Complaint: covid, directed here for monoclonal infusion per oncologist    HPI    Patient here with covid  for 9 days.   No travel, + contact with Use      Vaping Use: Never used    Alcohol use: Yes      Comment: rarely 1 drink per week    Drug use: No      Review of Systems    Positive for stated complaint: covid, directed here for monoclonal infusion per oncologist  Other systems are as noted in HP infusion and be monitored for 60 minutes after.    They have been advised that they should continue to self-isolate and use infection control measures (e.g., wear mask, isolate, social distance, avoid sharing personal items, clean and disinfect “high touch”

## 2021-08-28 NOTE — TELEPHONE ENCOUNTER
Home Monitoring Day 2 of 10. PAB 8/26 COVID + 8/16/21    What  was your temp today? - 99    How did you take your temp?  oral    What was your pulse ox today? 95    Are you feeling short of breath today?   With walking or talking    Is the shortness of lyndsey

## 2021-08-29 ENCOUNTER — HOSPITAL ENCOUNTER (OUTPATIENT)
Age: 56
Discharge: STILL ADMITTED OR ADMINISTRATIVE | End: 2021-08-29
Payer: COMMERCIAL

## 2021-08-29 ENCOUNTER — HOSPITAL ENCOUNTER (EMERGENCY)
Facility: HOSPITAL | Age: 56
Discharge: HOME | End: 2021-08-29
Attending: EMERGENCY MEDICINE
Payer: COMMERCIAL

## 2021-08-29 VITALS
RESPIRATION RATE: 18 BRPM | WEIGHT: 235 LBS | DIASTOLIC BLOOD PRESSURE: 60 MMHG | TEMPERATURE: 98 F | SYSTOLIC BLOOD PRESSURE: 116 MMHG | BODY MASS INDEX: 44 KG/M2 | OXYGEN SATURATION: 95 % | HEART RATE: 73 BPM

## 2021-08-29 DIAGNOSIS — J12.82 PNEUMONIA DUE TO COVID-19 VIRUS: Primary | ICD-10-CM

## 2021-08-29 DIAGNOSIS — U07.1 PNEUMONIA DUE TO COVID-19 VIRUS: Primary | ICD-10-CM

## 2021-08-29 PROCEDURE — 99282 EMERGENCY DEPT VISIT SF MDM: CPT

## 2021-08-29 NOTE — ED INITIAL ASSESSMENT (HPI)
covid + 8/16/21  [facility] 8/26    Notes her pulse ox is steady at 91% and concerned. Notes shortness of breath. Unvaccinated.

## 2021-08-29 NOTE — ED PROVIDER NOTES
Patient Seen in: [facility] AND [facility] Emergency Department      History   Patient presents with:  Covid    Stated Complaint: Covid+ 8/16, SPO2 is 91% today, cough    HPI/Subjective:   HPI    53-year-old female with past medical history significant for breas 18   Wt 106.6 kg   LMP 04/22/2017   SpO2 95%   BMI 44.40 kg/m²         Physical Exam    Physical Exam   Constitutional: AAOx3, well nourished, NAD  Head: Normocephalic and atraumatic.    Ears: TM's clear b/l  Nose: Nose normal.   Mouth/Throat: MMM, post OP

## 2021-08-30 ENCOUNTER — OFFICE VISIT (OUTPATIENT)
Dept: PHYSICAL THERAPY | Facility: HOSPITAL | Age: 56
End: 2021-08-30
Attending: SURGERY
Payer: COMMERCIAL

## 2021-08-30 NOTE — TELEPHONE ENCOUNTER
Home Monitoring Day 3 of 10. What  was your temp today? - did not take     How did you take your temp? What was your pulse ox today? Are you feeling short of breath today?    Yes, with activity     Is the shortness of breath better, the sa

## 2021-08-30 NOTE — PROGRESS NOTES
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

## 2021-08-31 ENCOUNTER — TELEPHONE (OUTPATIENT)
Dept: SURGERY | Facility: CLINIC | Age: 56
End: 2021-08-31

## 2021-08-31 NOTE — TELEPHONE ENCOUNTER
Calling pt in regards to her ED visit for pneumonia r/t Covid. Informed pt that since she is still symptomatic, we need to move her appt to 14 days from now. Pt states she is doing fine post-operatively with no problems and states she is healing well.   I

## 2021-08-31 NOTE — TELEPHONE ENCOUNTER
Home Monitoring Day 5 of 10 since had PAB infusion on 8/26/21 & first call was on 8/27/21 (COVID+ on 8/16/21). What  was your temp today?  Not checked; has not felt feverish \"in a couple days\"    How did you take your temp?     without a thermometer

## 2021-09-01 ENCOUNTER — TELEPHONE (OUTPATIENT)
Dept: PULMONOLOGY | Facility: CLINIC | Age: 56
End: 2021-09-01

## 2021-09-01 NOTE — TELEPHONE ENCOUNTER
Patient is scheduled for a consult with Dr. Gail Camara tomorrow 09/02/21  for sleep apnea. Called to do pre-visit Covid Screening questions. Patient was diagnosed with Covid-19 on 8/16/21. She stated she still has weakness, shortness of breath and a cough.

## 2021-09-01 NOTE — TELEPHONE ENCOUNTER
Unless there is a hospital policy that would prevent an office visit I’m OK with her coming to clinic. We can also do a telemedicine visit. Patient preference.

## 2021-09-01 NOTE — TELEPHONE ENCOUNTER
Spoke with patient. Patient agreeable to change appointment to video visit. Video visit instructions sent via [employer].

## 2021-09-01 NOTE — TELEPHONE ENCOUNTER
Dr. Karen Gong- Please read message below, okay for patient to keep appointment? Virtual visit? Or re-schedule?

## 2021-09-02 ENCOUNTER — TELEMEDICINE (OUTPATIENT)
Dept: PULMONOLOGY | Facility: CLINIC | Age: 56
End: 2021-09-02

## 2021-09-02 DIAGNOSIS — G47.33 OSA (OBSTRUCTIVE SLEEP APNEA): Primary | ICD-10-CM

## 2021-09-02 PROCEDURE — 99212 OFFICE O/P EST SF 10 MIN: CPT | Performed by: INTERNAL MEDICINE

## 2021-09-02 NOTE — TELEPHONE ENCOUNTER
Home Monitoring Day 7 of 10. What  was your temp today? 97.9    How did you take your temp?     with an oral thermometer    What was your pulse ox today? 94-95    Are you feeling short of breath today?    No      Is the shortness of breath better, the s

## 2021-09-02 NOTE — PROGRESS NOTES
Virtual Telephone Check-In    Flor Gaffney verbally consents to a Virtual/Telephone Check-In visit on 09/02/21. Patient understands and accepts financial responsibility for any deductible, co-insurance and/or co-pays associated with this service.

## 2021-09-07 ENCOUNTER — HOSPITAL ENCOUNTER (OUTPATIENT)
Dept: BONE DENSITY | Age: 56
Discharge: HOME | End: 2021-09-07
Attending: INTERNAL MEDICINE
Payer: COMMERCIAL

## 2021-09-07 DIAGNOSIS — Z78.0 POST-MENOPAUSAL: ICD-10-CM

## 2021-09-07 PROCEDURE — 77080 DXA BONE DENSITY AXIAL: CPT | Performed by: INTERNAL MEDICINE

## 2021-09-07 NOTE — TELEPHONE ENCOUNTER
Patient was seen in ER on 8/29/21 and monitored for 7+ days. Tele visit pulmonary on 9/2/21. Closing encounter.

## 2021-09-08 ENCOUNTER — OFFICE VISIT (OUTPATIENT)
Dept: RADIATION ONCOLOGY | Facility: HOSPITAL | Age: 56
End: 2021-09-08
Attending: INTERNAL MEDICINE
Payer: COMMERCIAL

## 2021-09-08 VITALS
RESPIRATION RATE: 16 BRPM | SYSTOLIC BLOOD PRESSURE: 125 MMHG | OXYGEN SATURATION: 98 % | BODY MASS INDEX: 43 KG/M2 | TEMPERATURE: 99 F | DIASTOLIC BLOOD PRESSURE: 71 MMHG | HEART RATE: 88 BPM | WEIGHT: 226.31 LBS

## 2021-09-08 DIAGNOSIS — Z17.0 MALIGNANT NEOPLASM OF UPPER-OUTER QUADRANT OF RIGHT BREAST IN FEMALE, ESTROGEN RECEPTOR POSITIVE (HCC): Primary | ICD-10-CM

## 2021-09-08 DIAGNOSIS — C50.411 MALIGNANT NEOPLASM OF UPPER-OUTER QUADRANT OF RIGHT BREAST IN FEMALE, ESTROGEN RECEPTOR POSITIVE (HCC): Primary | ICD-10-CM

## 2021-09-08 PROCEDURE — 99212 OFFICE O/P EST SF 10 MIN: CPT

## 2021-09-08 RX ORDER — MOMETASONE FUROATE 1 MG/G
CREAM TOPICAL
Qty: 50 G | Refills: 5 | Status: SHIPPED | OUTPATIENT
Start: 2021-09-08 | End: 2021-12-15

## 2021-09-08 NOTE — CONSULTS
[facility] On[facility] New Consultation      Patient Name: Casimiro Ingram   MRN: [id number]  PCP: Lucio Hidalgo MD  Referring Physician: Yarely Lane MD; Nba Lopez MD    Diagnosis Site: right breast, upper outer quadrant Date   •   ,,,2001    x 4   • LUMPECTOMY RIGHT         Medications  Current Outpatient Medications   Medication Sig Dispense Refill   • acetaminophen 500 MG Oral Tab Take 500 mg by mouth every 6 (six) hours as needed for Pain or Fever. edema, masses, or LAD. -Left breast: normal contours, no skin changes, no dimpling, no nipple changes, no discharge or erythema, no edema, no masses. Left axilla without edema, masses, or LAD.        Imaging: personally reviewed     Assessment: 59yo F wit care.

## 2021-09-08 NOTE — PATIENT INSTRUCTIONS
You will get a call to schedule your CT simulation. Call [phone] for any radiation questions or concerns.

## 2021-09-08 NOTE — PROGRESS NOTES
Nursing Consultation Note  Patient:  Levon Covington  YOB: 1965  Age: 60year old  Radiation Oncologist: Dr. Zeus Blanca  Referring Physician: [email or web address]  Consult Date: 9/8/2021      Chemotherapy: No  Labs: Re Cholecalciferol (VITAMIN D-3 OR) Take 1 tablet by mouth daily.          Preferred Pharmacy:    [facility] [address] [id number], [phone], [id number]  [phone]  Phone     Worried About [facility] in the Last Year:       Ran Out of Food in the Last Year:   Transportation Needs:       Lack of Transportation (Medical):       Lack of Transportation (Non-Medical):   Physical Activity:       Days of Exercise per We scheduling  Instruct on basic skin care  Instruct on purpose of radiation therapy  Instruct on side effects of radiation therapy    Expected Outcomes:  Knowledge of radiation therapy  Knowledge of side effects of radiation therapy and management  Comfortab

## 2021-09-14 ENCOUNTER — APPOINTMENT (OUTPATIENT)
Dept: RADIATION ONCOLOGY | Facility: HOSPITAL | Age: 56
End: 2021-09-14
Attending: INTERNAL MEDICINE
Payer: COMMERCIAL

## 2021-09-14 PROCEDURE — 77334 RADIATION TREATMENT AID(S): CPT | Performed by: INTERNAL MEDICINE

## 2021-09-14 PROCEDURE — 77290 THER RAD SIMULAJ FIELD CPLX: CPT | Performed by: INTERNAL MEDICINE

## 2021-09-15 ENCOUNTER — OFFICE VISIT (OUTPATIENT)
Dept: SURGERY | Facility: CLINIC | Age: 56
End: 2021-09-15

## 2021-09-15 VITALS
WEIGHT: 226 LBS | HEIGHT: 61 IN | RESPIRATION RATE: 18 BRPM | DIASTOLIC BLOOD PRESSURE: 57 MMHG | SYSTOLIC BLOOD PRESSURE: 129 MMHG | OXYGEN SATURATION: 98 % | TEMPERATURE: 98 F | HEART RATE: 78 BPM | BODY MASS INDEX: 42.67 KG/M2

## 2021-09-15 DIAGNOSIS — Z17.0 MALIGNANT NEOPLASM OF UPPER-OUTER QUADRANT OF RIGHT BREAST IN FEMALE, ESTROGEN RECEPTOR POSITIVE (HCC): Primary | ICD-10-CM

## 2021-09-15 DIAGNOSIS — C50.411 MALIGNANT NEOPLASM OF UPPER-OUTER QUADRANT OF RIGHT BREAST IN FEMALE, ESTROGEN RECEPTOR POSITIVE (HCC): Primary | ICD-10-CM

## 2021-09-15 PROCEDURE — 3008F BODY MASS INDEX DOCD: CPT | Performed by: SURGERY

## 2021-09-15 PROCEDURE — 3074F SYST BP LT 130 MM HG: CPT | Performed by: SURGERY

## 2021-09-15 PROCEDURE — 3078F DIAST BP <80 MM HG: CPT | Performed by: SURGERY

## 2021-09-15 PROCEDURE — 99024 POSTOP FOLLOW-UP VISIT: CPT | Performed by: SURGERY

## 2021-09-20 ENCOUNTER — OFFICE VISIT (OUTPATIENT)
Dept: SURGERY | Facility: CLINIC | Age: 56
End: 2021-09-20

## 2021-09-20 VITALS
BODY MASS INDEX: 42.29 KG/M2 | HEART RATE: 89 BPM | DIASTOLIC BLOOD PRESSURE: 72 MMHG | OXYGEN SATURATION: 97 % | SYSTOLIC BLOOD PRESSURE: 128 MMHG | WEIGHT: 224 LBS | HEIGHT: 61 IN

## 2021-09-20 DIAGNOSIS — Z99.89 OSA ON CPAP: ICD-10-CM

## 2021-09-20 DIAGNOSIS — E88.81 INSULIN RESISTANCE: Primary | ICD-10-CM

## 2021-09-20 DIAGNOSIS — G47.33 OSA ON CPAP: ICD-10-CM

## 2021-09-20 DIAGNOSIS — E66.01 MORBID OBESITY WITH BMI OF 40.0-44.9, ADULT (HCC): ICD-10-CM

## 2021-09-20 DIAGNOSIS — E78.5 DYSLIPIDEMIA: ICD-10-CM

## 2021-09-20 PROBLEM — E88.819 INSULIN RESISTANCE: Status: ACTIVE | Noted: 2021-09-20

## 2021-09-20 PROCEDURE — 3008F BODY MASS INDEX DOCD: CPT | Performed by: INTERNAL MEDICINE

## 2021-09-20 PROCEDURE — 99204 OFFICE O/P NEW MOD 45 MIN: CPT | Performed by: INTERNAL MEDICINE

## 2021-09-20 PROCEDURE — 3074F SYST BP LT 130 MM HG: CPT | Performed by: INTERNAL MEDICINE

## 2021-09-20 PROCEDURE — 3078F DIAST BP <80 MM HG: CPT | Performed by: INTERNAL MEDICINE

## 2021-09-20 NOTE — PROGRESS NOTES
[facility] Consultation Note       Patient:   Paco Chavarria  :      1965  MRN:      [id number]    Referring Provider: Dr. Benita Avila       Chief Complaint:  Patient presents with:  Consult  Weight Management      SUBJECTIVE • Selenium (SELENIMIN OR) Take 1 capsule by mouth daily. • Ascorbic Acid (VITAMIN C OR) Take 1 tablet by mouth daily. • Cholecalciferol (VITAMIN D-3 OR) Take 1 tablet by mouth daily. Allergies:  Patient has no known allergies.      [place] Connections:       Frequency of Communication with Friends and Family: Not on file      Frequency of Social Gatherings with Friends and Family: Not on file      Attends Gnosticist Services: Not on file      Active Member of Clubs or Organizations: Not on fi 30 minutes before or after meals, Utilize portion control strategies to reduce calorie intake, Identify triggers for eating and manage cues and Eat slowly and take 20 to 30 minutes to complete each meal      ROS:  Constitutional: positive for fatigue  Resp resistance    Dyslipidemia    Morbid obesity with BMI of 40.0-44.9, adult (HCC)    KIRTI on CPAP        Denys Eason MD

## 2021-09-24 PROCEDURE — 77300 RADIATION THERAPY DOSE PLAN: CPT | Performed by: INTERNAL MEDICINE

## 2021-09-24 PROCEDURE — 77295 3-D RADIOTHERAPY PLAN: CPT | Performed by: INTERNAL MEDICINE

## 2021-09-24 PROCEDURE — 77293 RESPIRATOR MOTION MGMT SIMUL: CPT | Performed by: INTERNAL MEDICINE

## 2021-09-24 PROCEDURE — 77334 RADIATION TREATMENT AID(S): CPT | Performed by: INTERNAL MEDICINE

## 2021-09-28 ENCOUNTER — APPOINTMENT (OUTPATIENT)
Dept: RADIATION ONCOLOGY | Facility: HOSPITAL | Age: 56
End: 2021-09-28
Attending: INTERNAL MEDICINE
Payer: COMMERCIAL

## 2021-09-28 PROCEDURE — 77412 RADIATION TX DELIVERY LVL 3: CPT | Performed by: INTERNAL MEDICINE

## 2021-09-28 PROCEDURE — 77280 THER RAD SIMULAJ FIELD SMPL: CPT | Performed by: INTERNAL MEDICINE

## 2021-09-29 PROCEDURE — 77412 RADIATION TX DELIVERY LVL 3: CPT | Performed by: INTERNAL MEDICINE

## 2021-09-29 PROCEDURE — 77387 GUIDANCE FOR RADJ TX DLVR: CPT | Performed by: INTERNAL MEDICINE

## 2021-09-30 ENCOUNTER — PATIENT MESSAGE (OUTPATIENT)
Dept: PULMONOLOGY | Facility: CLINIC | Age: 56
End: 2021-09-30

## 2021-09-30 ENCOUNTER — OFFICE VISIT (OUTPATIENT)
Dept: RADIATION ONCOLOGY | Facility: HOSPITAL | Age: 56
End: 2021-09-30
Attending: INTERNAL MEDICINE
Payer: COMMERCIAL

## 2021-09-30 DIAGNOSIS — C50.411 MALIGNANT NEOPLASM OF UPPER-OUTER QUADRANT OF RIGHT BREAST IN FEMALE, ESTROGEN RECEPTOR POSITIVE (HCC): Primary | ICD-10-CM

## 2021-09-30 DIAGNOSIS — Z17.0 MALIGNANT NEOPLASM OF UPPER-OUTER QUADRANT OF RIGHT BREAST IN FEMALE, ESTROGEN RECEPTOR POSITIVE (HCC): Primary | ICD-10-CM

## 2021-09-30 NOTE — PROGRESS NOTES
[facility] Radiation Treatment Management Note 1-5    Patient: Naomy Hodges  Age:  60year old  Visit Diagnosis:    1.  Malignant neoplasm of upper-outer quadrant of right breast in female, estrogen receptor positive ([facility] [address] [address]

## 2021-10-01 ENCOUNTER — OFFICE VISIT (OUTPATIENT)
Dept: RADIATION ONCOLOGY | Facility: HOSPITAL | Age: 56
End: 2021-10-01
Attending: INTERNAL MEDICINE
Payer: COMMERCIAL

## 2021-10-01 ENCOUNTER — PATIENT MESSAGE (OUTPATIENT)
Dept: PULMONOLOGY | Facility: CLINIC | Age: 56
End: 2021-10-01

## 2021-10-01 VITALS
OXYGEN SATURATION: 98 % | RESPIRATION RATE: 16 BRPM | TEMPERATURE: 98 F | SYSTOLIC BLOOD PRESSURE: 142 MMHG | HEART RATE: 87 BPM | DIASTOLIC BLOOD PRESSURE: 72 MMHG

## 2021-10-01 DIAGNOSIS — C50.411 MALIGNANT NEOPLASM OF UPPER-OUTER QUADRANT OF RIGHT BREAST IN FEMALE, ESTROGEN RECEPTOR POSITIVE (HCC): Primary | ICD-10-CM

## 2021-10-01 DIAGNOSIS — Z17.0 MALIGNANT NEOPLASM OF UPPER-OUTER QUADRANT OF RIGHT BREAST IN FEMALE, ESTROGEN RECEPTOR POSITIVE (HCC): Primary | ICD-10-CM

## 2021-10-01 PROCEDURE — 77412 RADIATION TX DELIVERY LVL 3: CPT | Performed by: INTERNAL MEDICINE

## 2021-10-01 PROCEDURE — 77387 GUIDANCE FOR RADJ TX DLVR: CPT | Performed by: INTERNAL MEDICINE

## 2021-10-01 NOTE — TELEPHONE ENCOUNTER
From: Lilliam Guzman  To: Russ Ramírez MD  Sent: 9/30/2021 2:53 PM CDT  Subject: Recall of cpap machine    Hi Dr. Caitlyn Knox,    I just got a letter today from [address] [place] [address] stating that several cpap machines have been recalled on June 14, 2021.  I registered my .

## 2021-10-01 NOTE — PROGRESS NOTES
[facility] Radiation Treatment Management Note 1-5    Patient: Dago Herrera  Age:  60year old  Visit Diagnosis:    1.  Malignant neoplasm of upper-outer quadrant of right breast in female, estrogen receptor positive ([facility] [address]

## 2021-10-04 PROCEDURE — 77412 RADIATION TX DELIVERY LVL 3: CPT | Performed by: INTERNAL MEDICINE

## 2021-10-04 PROCEDURE — 77387 GUIDANCE FOR RADJ TX DLVR: CPT | Performed by: INTERNAL MEDICINE

## 2021-10-04 NOTE — PROGRESS NOTES
Breast Surgery Post-Operative Visit    Diagnosis: Right breast cancer status post lumpectomy and sentinel lymph node biopsy on August 2, 2021.     Stage: Cancer Staging  Malignant neoplasm of upper-outer quadrant of right breast in female, estrogen receptor Medical History:   Diagnosis Date   • Abnormal uterine bleeding 4/23/17    Last occurence   • Amenorrhea    • Cancer [facility])     right breast   • Disorder of thyroid    • Dyspareunia    • Hypothyroidism    • Morbid obesity with BMI of 40.0-44.9, adult ([address] Comment: rarely 1 drink per week         Smoking status: Former Smoker     She is  and has four sons. She works part time during school season.   Review of Systems:  General:   The patient denies, fever, chills, +night sweats, fatigue, generalized Musculoskeletal:  The patient denies muscle aches/pain, +joint pain, stiff joints, neck pain, back pain or bone pain.     Neuropsychiatric:  There is no history of +migraines or severe headaches,  seizure/epilepsy, speech problems, coordination problems normal.  There is a well-healed incision with no signs of erythema or fluctuance. Left breast:   The skin, nipple, and areola appear normal. There is no skin dimpling with movement of the pectoralis. There is no nipple retraction.  No nipple discharge can her next scheduled appointment.

## 2021-10-05 PROCEDURE — 77412 RADIATION TX DELIVERY LVL 3: CPT | Performed by: INTERNAL MEDICINE

## 2021-10-05 PROCEDURE — 77387 GUIDANCE FOR RADJ TX DLVR: CPT | Performed by: INTERNAL MEDICINE

## 2021-10-06 ENCOUNTER — APPOINTMENT (OUTPATIENT)
Dept: RADIATION ONCOLOGY | Facility: HOSPITAL | Age: 56
End: 2021-10-06
Attending: INTERNAL MEDICINE
Payer: COMMERCIAL

## 2021-10-06 PROCEDURE — 77412 RADIATION TX DELIVERY LVL 3: CPT | Performed by: INTERNAL MEDICINE

## 2021-10-06 PROCEDURE — 77387 GUIDANCE FOR RADJ TX DLVR: CPT | Performed by: INTERNAL MEDICINE

## 2021-10-07 ENCOUNTER — APPOINTMENT (OUTPATIENT)
Dept: RADIATION ONCOLOGY | Facility: HOSPITAL | Age: 56
End: 2021-10-07
Attending: INTERNAL MEDICINE
Payer: COMMERCIAL

## 2021-10-07 VITALS
OXYGEN SATURATION: 98 % | RESPIRATION RATE: 16 BRPM | SYSTOLIC BLOOD PRESSURE: 121 MMHG | HEART RATE: 90 BPM | DIASTOLIC BLOOD PRESSURE: 76 MMHG | TEMPERATURE: 98 F

## 2021-10-07 DIAGNOSIS — C50.411 MALIGNANT NEOPLASM OF UPPER-OUTER QUADRANT OF RIGHT BREAST IN FEMALE, ESTROGEN RECEPTOR POSITIVE (HCC): Primary | ICD-10-CM

## 2021-10-07 DIAGNOSIS — Z17.0 MALIGNANT NEOPLASM OF UPPER-OUTER QUADRANT OF RIGHT BREAST IN FEMALE, ESTROGEN RECEPTOR POSITIVE (HCC): Primary | ICD-10-CM

## 2021-10-07 PROCEDURE — 77387 GUIDANCE FOR RADJ TX DLVR: CPT | Performed by: INTERNAL MEDICINE

## 2021-10-07 PROCEDURE — 77412 RADIATION TX DELIVERY LVL 3: CPT | Performed by: INTERNAL MEDICINE

## 2021-10-07 NOTE — PROGRESS NOTES
[facility] Radiation Treatment Management Note 6-10    Patient: Lilliam Guzman  Age:  60year old  Visit Diagnosis:    1.  Malignant neoplasm of upper-outer quadrant of right breast in female, estrogen receptor positive ([facility] [address]

## 2021-10-08 PROCEDURE — 77336 RADIATION PHYSICS CONSULT: CPT | Performed by: INTERNAL MEDICINE

## 2021-10-08 PROCEDURE — 77387 GUIDANCE FOR RADJ TX DLVR: CPT | Performed by: INTERNAL MEDICINE

## 2021-10-08 PROCEDURE — 77412 RADIATION TX DELIVERY LVL 3: CPT | Performed by: INTERNAL MEDICINE

## 2021-10-11 ENCOUNTER — APPOINTMENT (OUTPATIENT)
Dept: RADIATION ONCOLOGY | Facility: HOSPITAL | Age: 56
End: 2021-10-11
Attending: INTERNAL MEDICINE
Payer: COMMERCIAL

## 2021-10-11 PROCEDURE — 77387 GUIDANCE FOR RADJ TX DLVR: CPT | Performed by: INTERNAL MEDICINE

## 2021-10-11 PROCEDURE — 77412 RADIATION TX DELIVERY LVL 3: CPT | Performed by: INTERNAL MEDICINE

## 2021-10-12 PROCEDURE — 77412 RADIATION TX DELIVERY LVL 3: CPT | Performed by: INTERNAL MEDICINE

## 2021-10-12 PROCEDURE — 77387 GUIDANCE FOR RADJ TX DLVR: CPT | Performed by: INTERNAL MEDICINE

## 2021-10-13 ENCOUNTER — APPOINTMENT (OUTPATIENT)
Dept: RADIATION ONCOLOGY | Facility: HOSPITAL | Age: 56
End: 2021-10-13
Attending: INTERNAL MEDICINE
Payer: COMMERCIAL

## 2021-10-13 PROCEDURE — 77412 RADIATION TX DELIVERY LVL 3: CPT | Performed by: INTERNAL MEDICINE

## 2021-10-13 PROCEDURE — 77387 GUIDANCE FOR RADJ TX DLVR: CPT | Performed by: INTERNAL MEDICINE

## 2021-10-14 ENCOUNTER — APPOINTMENT (OUTPATIENT)
Dept: RADIATION ONCOLOGY | Facility: HOSPITAL | Age: 56
End: 2021-10-14
Attending: INTERNAL MEDICINE
Payer: COMMERCIAL

## 2021-10-14 VITALS
TEMPERATURE: 99 F | SYSTOLIC BLOOD PRESSURE: 150 MMHG | OXYGEN SATURATION: 98 % | DIASTOLIC BLOOD PRESSURE: 77 MMHG | HEART RATE: 84 BPM | RESPIRATION RATE: 16 BRPM

## 2021-10-14 DIAGNOSIS — C50.411 MALIGNANT NEOPLASM OF UPPER-OUTER QUADRANT OF RIGHT BREAST IN FEMALE, ESTROGEN RECEPTOR POSITIVE (HCC): Primary | ICD-10-CM

## 2021-10-14 DIAGNOSIS — Z17.0 MALIGNANT NEOPLASM OF UPPER-OUTER QUADRANT OF RIGHT BREAST IN FEMALE, ESTROGEN RECEPTOR POSITIVE (HCC): Primary | ICD-10-CM

## 2021-10-14 PROCEDURE — 77387 GUIDANCE FOR RADJ TX DLVR: CPT | Performed by: INTERNAL MEDICINE

## 2021-10-14 PROCEDURE — 77412 RADIATION TX DELIVERY LVL 3: CPT | Performed by: INTERNAL MEDICINE

## 2021-10-14 NOTE — PROGRESS NOTES
[facility] Radiation Treatment Management Note 11-15    Patient:   Azul Williamson  Age:  60year old  Visit Diagnosis:  R breast IDC, pT2 N1a cM0, stage IIB, grade 3, ER/MS+  Primary Rad/Onc:  Dr. Scar Vines    Site Delivered Dose

## 2021-10-15 PROCEDURE — 77336 RADIATION PHYSICS CONSULT: CPT | Performed by: INTERNAL MEDICINE

## 2021-10-15 PROCEDURE — 77412 RADIATION TX DELIVERY LVL 3: CPT | Performed by: INTERNAL MEDICINE

## 2021-10-15 PROCEDURE — 77387 GUIDANCE FOR RADJ TX DLVR: CPT | Performed by: INTERNAL MEDICINE

## 2021-10-18 PROCEDURE — 77387 GUIDANCE FOR RADJ TX DLVR: CPT | Performed by: INTERNAL MEDICINE

## 2021-10-18 PROCEDURE — 77412 RADIATION TX DELIVERY LVL 3: CPT | Performed by: INTERNAL MEDICINE

## 2021-10-19 PROCEDURE — 77387 GUIDANCE FOR RADJ TX DLVR: CPT | Performed by: INTERNAL MEDICINE

## 2021-10-19 PROCEDURE — 77412 RADIATION TX DELIVERY LVL 3: CPT | Performed by: INTERNAL MEDICINE

## 2021-10-20 ENCOUNTER — APPOINTMENT (OUTPATIENT)
Dept: RADIATION ONCOLOGY | Facility: HOSPITAL | Age: 56
End: 2021-10-20
Attending: INTERNAL MEDICINE
Payer: COMMERCIAL

## 2021-10-20 PROCEDURE — 77412 RADIATION TX DELIVERY LVL 3: CPT | Performed by: INTERNAL MEDICINE

## 2021-10-20 PROCEDURE — 77387 GUIDANCE FOR RADJ TX DLVR: CPT | Performed by: INTERNAL MEDICINE

## 2021-10-21 ENCOUNTER — APPOINTMENT (OUTPATIENT)
Dept: RADIATION ONCOLOGY | Facility: HOSPITAL | Age: 56
End: 2021-10-21
Attending: INTERNAL MEDICINE
Payer: COMMERCIAL

## 2021-10-21 VITALS
DIASTOLIC BLOOD PRESSURE: 82 MMHG | TEMPERATURE: 98 F | OXYGEN SATURATION: 98 % | RESPIRATION RATE: 16 BRPM | SYSTOLIC BLOOD PRESSURE: 141 MMHG | HEART RATE: 94 BPM

## 2021-10-21 DIAGNOSIS — Z17.0 MALIGNANT NEOPLASM OF UPPER-OUTER QUADRANT OF RIGHT BREAST IN FEMALE, ESTROGEN RECEPTOR POSITIVE (HCC): Primary | ICD-10-CM

## 2021-10-21 DIAGNOSIS — C50.411 MALIGNANT NEOPLASM OF UPPER-OUTER QUADRANT OF RIGHT BREAST IN FEMALE, ESTROGEN RECEPTOR POSITIVE (HCC): Primary | ICD-10-CM

## 2021-10-21 PROCEDURE — 77412 RADIATION TX DELIVERY LVL 3: CPT | Performed by: INTERNAL MEDICINE

## 2021-10-21 PROCEDURE — 77387 GUIDANCE FOR RADJ TX DLVR: CPT | Performed by: INTERNAL MEDICINE

## 2021-10-21 PROCEDURE — 77307 TELETHX ISODOSE PLAN CPLX: CPT | Performed by: INTERNAL MEDICINE

## 2021-10-21 PROCEDURE — 77334 RADIATION TREATMENT AID(S): CPT | Performed by: INTERNAL MEDICINE

## 2021-10-21 PROCEDURE — 77290 THER RAD SIMULAJ FIELD CPLX: CPT | Performed by: INTERNAL MEDICINE

## 2021-10-21 NOTE — PROGRESS NOTES
[facility] Radiation Treatment Management Note 16-20    Patient:   Kelsea Traylor  Age:  60year old  Visit Diagnosis:  R breast IDC, pT2 N1a cM0, stage IIB, grade 3, ER/ND+  Primary Rad/Onc:  Dr. Yaneli Vasquez    Site Delivered Dose

## 2021-10-22 PROCEDURE — 77336 RADIATION PHYSICS CONSULT: CPT | Performed by: INTERNAL MEDICINE

## 2021-10-22 PROCEDURE — 77387 GUIDANCE FOR RADJ TX DLVR: CPT | Performed by: INTERNAL MEDICINE

## 2021-10-22 PROCEDURE — 77412 RADIATION TX DELIVERY LVL 3: CPT | Performed by: INTERNAL MEDICINE

## 2021-10-25 PROCEDURE — 77412 RADIATION TX DELIVERY LVL 3: CPT | Performed by: INTERNAL MEDICINE

## 2021-10-25 PROCEDURE — 77387 GUIDANCE FOR RADJ TX DLVR: CPT | Performed by: INTERNAL MEDICINE

## 2021-10-26 PROCEDURE — 77387 GUIDANCE FOR RADJ TX DLVR: CPT | Performed by: INTERNAL MEDICINE

## 2021-10-26 PROCEDURE — 77412 RADIATION TX DELIVERY LVL 3: CPT | Performed by: INTERNAL MEDICINE

## 2021-10-27 ENCOUNTER — APPOINTMENT (OUTPATIENT)
Dept: RADIATION ONCOLOGY | Facility: HOSPITAL | Age: 56
End: 2021-10-27
Attending: INTERNAL MEDICINE
Payer: COMMERCIAL

## 2021-10-27 PROCEDURE — 77387 GUIDANCE FOR RADJ TX DLVR: CPT | Performed by: INTERNAL MEDICINE

## 2021-10-27 PROCEDURE — 77412 RADIATION TX DELIVERY LVL 3: CPT | Performed by: INTERNAL MEDICINE

## 2021-10-28 ENCOUNTER — OFFICE VISIT (OUTPATIENT)
Dept: RADIATION ONCOLOGY | Facility: HOSPITAL | Age: 56
End: 2021-10-28
Attending: INTERNAL MEDICINE
Payer: COMMERCIAL

## 2021-10-28 VITALS
RESPIRATION RATE: 16 BRPM | SYSTOLIC BLOOD PRESSURE: 152 MMHG | HEART RATE: 86 BPM | TEMPERATURE: 98 F | DIASTOLIC BLOOD PRESSURE: 89 MMHG | OXYGEN SATURATION: 100 %

## 2021-10-28 DIAGNOSIS — C50.411 MALIGNANT NEOPLASM OF UPPER-OUTER QUADRANT OF RIGHT BREAST IN FEMALE, ESTROGEN RECEPTOR POSITIVE (HCC): Primary | ICD-10-CM

## 2021-10-28 DIAGNOSIS — Z17.0 MALIGNANT NEOPLASM OF UPPER-OUTER QUADRANT OF RIGHT BREAST IN FEMALE, ESTROGEN RECEPTOR POSITIVE (HCC): Primary | ICD-10-CM

## 2021-10-28 PROCEDURE — 77412 RADIATION TX DELIVERY LVL 3: CPT | Performed by: INTERNAL MEDICINE

## 2021-10-28 PROCEDURE — 77387 GUIDANCE FOR RADJ TX DLVR: CPT | Performed by: INTERNAL MEDICINE

## 2021-10-28 NOTE — PROGRESS NOTES
[facility] Radiation Treatment Management Note 21-25    Patient: Paco Chavarria  Age:  60year old  Visit Diagnosis:    1.  Malignant neoplasm of upper-outer quadrant of right breast in female, estrogen receptor positive ([facility] [address]

## 2021-10-29 PROCEDURE — 77336 RADIATION PHYSICS CONSULT: CPT | Performed by: INTERNAL MEDICINE

## 2021-10-29 PROCEDURE — 77387 GUIDANCE FOR RADJ TX DLVR: CPT | Performed by: INTERNAL MEDICINE

## 2021-10-29 PROCEDURE — 77412 RADIATION TX DELIVERY LVL 3: CPT | Performed by: INTERNAL MEDICINE

## 2021-11-01 ENCOUNTER — APPOINTMENT (OUTPATIENT)
Dept: RADIATION ONCOLOGY | Facility: HOSPITAL | Age: 56
End: 2021-11-01
Attending: INTERNAL MEDICINE
Payer: COMMERCIAL

## 2021-11-01 PROCEDURE — 77387 GUIDANCE FOR RADJ TX DLVR: CPT | Performed by: INTERNAL MEDICINE

## 2021-11-01 PROCEDURE — 77412 RADIATION TX DELIVERY LVL 3: CPT | Performed by: INTERNAL MEDICINE

## 2021-11-02 ENCOUNTER — APPOINTMENT (OUTPATIENT)
Dept: RADIATION ONCOLOGY | Facility: HOSPITAL | Age: 56
End: 2021-11-02
Attending: INTERNAL MEDICINE
Payer: COMMERCIAL

## 2021-11-02 PROCEDURE — 77387 GUIDANCE FOR RADJ TX DLVR: CPT | Performed by: INTERNAL MEDICINE

## 2021-11-02 PROCEDURE — 77412 RADIATION TX DELIVERY LVL 3: CPT | Performed by: INTERNAL MEDICINE

## 2021-11-03 ENCOUNTER — APPOINTMENT (OUTPATIENT)
Dept: RADIATION ONCOLOGY | Facility: HOSPITAL | Age: 56
End: 2021-11-03
Attending: INTERNAL MEDICINE
Payer: COMMERCIAL

## 2021-11-03 RX ORDER — LEVOTHYROXINE SODIUM 175 UG/1
175 TABLET ORAL
Qty: 90 TABLET | Refills: 0 | Status: SHIPPED | OUTPATIENT
Start: 2021-11-03

## 2021-11-03 NOTE — TELEPHONE ENCOUNTER
Please review. Protocol Failed / No Protocol.     Requested Prescriptions   Pending Prescriptions Disp Refills    LEVOTHYROXINE 175 MCG Oral Tab [Pharmacy Med Name: LEVOTHYROXINE 0.175MG (175MCG) TABS] 90 tablet 0     Sig: TAKE 1 TABLET(175 MCG) BY MOUTH B

## 2021-11-04 ENCOUNTER — OFFICE VISIT (OUTPATIENT)
Dept: RADIATION ONCOLOGY | Facility: HOSPITAL | Age: 56
End: 2021-11-04
Attending: INTERNAL MEDICINE
Payer: COMMERCIAL

## 2021-11-04 VITALS
OXYGEN SATURATION: 98 % | TEMPERATURE: 98 F | SYSTOLIC BLOOD PRESSURE: 149 MMHG | DIASTOLIC BLOOD PRESSURE: 62 MMHG | RESPIRATION RATE: 16 BRPM | HEART RATE: 85 BPM

## 2021-11-04 DIAGNOSIS — C50.411 MALIGNANT NEOPLASM OF UPPER-OUTER QUADRANT OF RIGHT BREAST IN FEMALE, ESTROGEN RECEPTOR POSITIVE (HCC): Primary | ICD-10-CM

## 2021-11-04 DIAGNOSIS — Z17.0 MALIGNANT NEOPLASM OF UPPER-OUTER QUADRANT OF RIGHT BREAST IN FEMALE, ESTROGEN RECEPTOR POSITIVE (HCC): Primary | ICD-10-CM

## 2021-11-04 PROCEDURE — 77014 HC CT GUIDANCE FOR PLACEMENT OF RADIATION THERAPY FIELDS: CPT | Performed by: INTERNAL MEDICINE

## 2021-11-04 NOTE — PROGRESS NOTES
[facility] Radiation Treatment Management Note 26-30    Patient:   Anabel Cordova  Age:  60year old  Visit Diagnosis:  R breast IDC, pT2 N1a cM0, stage IIB, grade 3, ER/SC+  Primary Rad/Onc:  Dr. Eve Oneal    Site Delivered Dose

## 2021-11-05 PROCEDURE — 77336 RADIATION PHYSICS CONSULT: CPT | Performed by: INTERNAL MEDICINE

## 2021-11-08 PROCEDURE — 77280 THER RAD SIMULAJ FIELD SMPL: CPT | Performed by: INTERNAL MEDICINE

## 2021-11-08 PROCEDURE — 77412 RADIATION TX DELIVERY LVL 3: CPT | Performed by: INTERNAL MEDICINE

## 2021-11-09 PROCEDURE — 77412 RADIATION TX DELIVERY LVL 3: CPT | Performed by: INTERNAL MEDICINE

## 2021-11-09 PROCEDURE — 77387 GUIDANCE FOR RADJ TX DLVR: CPT | Performed by: INTERNAL MEDICINE

## 2021-11-10 ENCOUNTER — APPOINTMENT (OUTPATIENT)
Dept: RADIATION ONCOLOGY | Facility: HOSPITAL | Age: 56
End: 2021-11-10
Attending: INTERNAL MEDICINE
Payer: COMMERCIAL

## 2021-11-10 PROCEDURE — 77412 RADIATION TX DELIVERY LVL 3: CPT | Performed by: INTERNAL MEDICINE

## 2021-11-10 PROCEDURE — 77387 GUIDANCE FOR RADJ TX DLVR: CPT | Performed by: INTERNAL MEDICINE

## 2021-11-11 ENCOUNTER — OFFICE VISIT (OUTPATIENT)
Dept: RADIATION ONCOLOGY | Facility: HOSPITAL | Age: 56
End: 2021-11-11
Attending: INTERNAL MEDICINE
Payer: COMMERCIAL

## 2021-11-11 VITALS
BODY MASS INDEX: 41 KG/M2 | HEART RATE: 87 BPM | WEIGHT: 214.38 LBS | TEMPERATURE: 98 F | RESPIRATION RATE: 16 BRPM | SYSTOLIC BLOOD PRESSURE: 142 MMHG | OXYGEN SATURATION: 98 % | DIASTOLIC BLOOD PRESSURE: 67 MMHG

## 2021-11-11 DIAGNOSIS — Z17.0 MALIGNANT NEOPLASM OF UPPER-OUTER QUADRANT OF RIGHT BREAST IN FEMALE, ESTROGEN RECEPTOR POSITIVE (HCC): Primary | ICD-10-CM

## 2021-11-11 DIAGNOSIS — C50.411 MALIGNANT NEOPLASM OF UPPER-OUTER QUADRANT OF RIGHT BREAST IN FEMALE, ESTROGEN RECEPTOR POSITIVE (HCC): Primary | ICD-10-CM

## 2021-11-11 PROCEDURE — 77387 GUIDANCE FOR RADJ TX DLVR: CPT | Performed by: INTERNAL MEDICINE

## 2021-11-11 PROCEDURE — 77412 RADIATION TX DELIVERY LVL 3: CPT | Performed by: INTERNAL MEDICINE

## 2021-11-11 NOTE — PATIENT INSTRUCTIONS
Follow up with Dr. Brian Feldman in 1 month. Dagoberto Mabry will call you to schedule your follow up appointment. Please call [phone] with any radiation questions. Stop mometasone, continue to moisturize for 2 weeks following completion of RT.

## 2021-11-11 NOTE — PROGRESS NOTES
[facility] Radiation Treatment Management Note 26-30    Patient:   Kelsea Traylor  Age:  60year old  Visit Diagnosis:  R breast IDC, pT2 N1a cM0, stage IIB, grade 3, ER/KS+  Primary Rad/Onc:  Dr. Yaneli Vasquez    Site Delivered Dose

## 2021-11-12 ENCOUNTER — DOCUMENTATION ONLY (OUTPATIENT)
Dept: RADIATION ONCOLOGY | Facility: HOSPITAL | Age: 56
End: 2021-11-12

## 2021-11-12 PROCEDURE — 77336 RADIATION PHYSICS CONSULT: CPT | Performed by: INTERNAL MEDICINE

## 2021-11-12 PROCEDURE — 77412 RADIATION TX DELIVERY LVL 3: CPT | Performed by: INTERNAL MEDICINE

## 2021-11-12 PROCEDURE — 77387 GUIDANCE FOR RADJ TX DLVR: CPT | Performed by: INTERNAL MEDICINE

## 2021-11-18 NOTE — PROGRESS NOTES
Radiation Oncology Treatment Summary    Diagnosis: right breast invasive ductal carcinoma, grade 3, ER/UT+, HER2-, Ki-67 4%,  pT2 (3.0 cm) N1a (1/6 LN, 0.3 cm) cM0, overall stage IIB    Site:   1. Right breast  2. Right SCF  3.   Right breast boost    [facility] managed with mometasone and moisturizer. She did experience a small linear area of dry desquamation in the IM fold. Follow-up: Return to clinic in 1 month or sooner if needed. Continue follow-up with other physicians as planned.      For more informatio

## 2021-11-22 ENCOUNTER — TELEPHONE (OUTPATIENT)
Dept: CASE MANAGEMENT | Age: 56
End: 2021-11-22

## 2021-11-22 DIAGNOSIS — C50.411 MALIGNANT NEOPLASM OF UPPER-OUTER QUADRANT OF RIGHT BREAST IN FEMALE, ESTROGEN RECEPTOR POSITIVE (HCC): Primary | ICD-10-CM

## 2021-11-22 DIAGNOSIS — Z17.0 MALIGNANT NEOPLASM OF UPPER-OUTER QUADRANT OF RIGHT BREAST IN FEMALE, ESTROGEN RECEPTOR POSITIVE (HCC): Primary | ICD-10-CM

## 2021-11-22 NOTE — TELEPHONE ENCOUNTER
Dr. Ady Wilkins,    The patient needs a referral for follow up visits with Dr. Brandon Duarte. Pended referral please review diagnosis and sign off if you agree. Thank you.   Sylvain Barbosa

## 2021-11-30 ENCOUNTER — OFFICE VISIT (OUTPATIENT)
Dept: PHYSICAL THERAPY | Facility: HOSPITAL | Age: 56
End: 2021-11-30
Attending: SURGERY
Payer: COMMERCIAL

## 2021-11-30 NOTE — PROGRESS NOTES
BREAST CANCER SURGICAL SCREENINGS  [facility] REHABILITATION      PATIENT SUMMARY:     Involved Side: Right                                                     Dominant Hand:  Left                           Occupation:    Work in lunch room Shoulder  A/AAROM A/AAROM   Supine flex 170 170  Supine flex 170 170  Supine flex      abd 160 160   abd 160 160   abd      ER 90 90   ER 90 90   ER      IR wfl wfl   IR wfl wfl   IR     Sitting flex 165 165  Sitting flex 165 165  Sitting flex 165 165    a feelings of heaviness or tightness, swelling, redness, and/or heat in the at-risk arm, breast, chest, or truncal area?  no                                     Arm Volume     Arm Volume deferred    Arm Volume        Lymphedema Calculations 11/30/2021 7/30/20

## 2021-12-15 ENCOUNTER — OFFICE VISIT (OUTPATIENT)
Dept: RADIATION ONCOLOGY | Facility: HOSPITAL | Age: 56
End: 2021-12-15
Attending: INTERNAL MEDICINE
Payer: COMMERCIAL

## 2021-12-15 VITALS
RESPIRATION RATE: 16 BRPM | TEMPERATURE: 98 F | DIASTOLIC BLOOD PRESSURE: 89 MMHG | SYSTOLIC BLOOD PRESSURE: 178 MMHG | OXYGEN SATURATION: 98 % | HEART RATE: 87 BPM

## 2021-12-15 DIAGNOSIS — C50.411 MALIGNANT NEOPLASM OF UPPER-OUTER QUADRANT OF RIGHT BREAST IN FEMALE, ESTROGEN RECEPTOR POSITIVE (HCC): Primary | ICD-10-CM

## 2021-12-15 DIAGNOSIS — Z17.0 MALIGNANT NEOPLASM OF UPPER-OUTER QUADRANT OF RIGHT BREAST IN FEMALE, ESTROGEN RECEPTOR POSITIVE (HCC): Primary | ICD-10-CM

## 2021-12-15 PROCEDURE — 99211 OFF/OP EST MAY X REQ PHY/QHP: CPT

## 2021-12-15 NOTE — PROGRESS NOTES
Nursing Follow-Up Note    Patient:  Anabel Cordova  YOB: 1965  Age: 60year old  Radiation Oncologist: Dr. Eve Oneal  Referring Physician: Eve Oneal  Chief Complaint: Patient presents with:  Breast Cancer    Date: 12/15/2021

## 2021-12-15 NOTE — PATIENT INSTRUCTIONS
Follow up with Dr. Allyson Medina in May 2022. Ross Espana will call you to schedule your follow up appointment. Please call [phone] with any radiation questions.

## 2021-12-15 NOTE — PROGRESS NOTES
[facility]  Radiation Oncology Follow-up    Patient Name: Celeste Stevens   MRN: [id number]  Referring Physician: Andre Blevins MD; Tresa You MD    Diagnosis: right breast invasive ductal carcinoma, grade 3, ER/IA+, HER2-, Ki-67 4%, NCAT  Neck: no LAD  CV: RRR  Lungs: CTAB  Ext: wwp, no cyanosis or edema  Neuro: CN II-XII grossly intact  Ext: R breast with residual moderate hyperpigmentation and edema, firmness at surgical sites, no obvious cording in R axilla, no axillary masses, no

## 2021-12-17 ENCOUNTER — OFFICE VISIT (OUTPATIENT)
Dept: HEMATOLOGY/ONCOLOGY | Facility: HOSPITAL | Age: 56
End: 2021-12-17
Attending: INTERNAL MEDICINE
Payer: COMMERCIAL

## 2021-12-17 VITALS
HEIGHT: 61 IN | HEART RATE: 79 BPM | RESPIRATION RATE: 18 BRPM | TEMPERATURE: 98 F | WEIGHT: 215 LBS | SYSTOLIC BLOOD PRESSURE: 127 MMHG | BODY MASS INDEX: 40.59 KG/M2 | DIASTOLIC BLOOD PRESSURE: 61 MMHG

## 2021-12-17 DIAGNOSIS — C50.411 MALIGNANT NEOPLASM OF UPPER-OUTER QUADRANT OF RIGHT BREAST IN FEMALE, ESTROGEN RECEPTOR POSITIVE (HCC): Primary | ICD-10-CM

## 2021-12-17 DIAGNOSIS — Z17.0 MALIGNANT NEOPLASM OF UPPER-OUTER QUADRANT OF RIGHT BREAST IN FEMALE, ESTROGEN RECEPTOR POSITIVE (HCC): Primary | ICD-10-CM

## 2021-12-17 PROCEDURE — 99213 OFFICE O/P EST LOW 20 MIN: CPT | Performed by: INTERNAL MEDICINE

## 2021-12-17 RX ORDER — LETROZOLE 2.5 MG/1
2.5 TABLET, FILM COATED ORAL DAILY
Qty: 30 TABLET | Refills: 6 | Status: SHIPPED | OUTPATIENT
Start: 2021-12-17

## 2021-12-17 NOTE — PROGRESS NOTES
HPI:  Disha Avila is a 60year old female with diagnosis of Malignant neoplasm of upper-outer quadrant of right breast in female, estrogen receptor positive (hcc)  (primary encounter diagnosis)    Completed RT 11/12/21. RT was well tolerated.   Albaro Francis Smokeless tobacco: Never Used    Vaping Use      Vaping Use: Never used    Alcohol use: Yes      Comment: rarely 1 drink per week    Drug use: No         Exam:  Deferred. Assessment and Plan:     Disha Avila is a 60year old female being evaluate

## 2022-01-05 ENCOUNTER — PATIENT MESSAGE (OUTPATIENT)
Dept: HEMATOLOGY/ONCOLOGY | Facility: HOSPITAL | Age: 57
End: 2022-01-05

## 2022-01-05 DIAGNOSIS — E89.89 LYMPHEDEMA OF UPPER EXTREMITY FOLLOWING LYMPHADENECTOMY: Primary | ICD-10-CM

## 2022-01-05 DIAGNOSIS — I89.0 LYMPHEDEMA OF UPPER EXTREMITY FOLLOWING LYMPHADENECTOMY: Primary | ICD-10-CM

## 2022-01-05 NOTE — TELEPHONE ENCOUNTER
From: Ena Nguyen  To: Haily Jauregui MD  Sent: 1/5/2022 7:10 AM CST  Subject: Physical therapy    Good morning, Dr. Mendez Ley,  I am afraid that I may need to get back to the physical therapy/lymphedema clinic.  The pain has moved into my elbow and my arm

## 2022-01-11 ENCOUNTER — TELEPHONE (OUTPATIENT)
Dept: PHYSICAL THERAPY | Facility: HOSPITAL | Age: 57
End: 2022-01-11

## 2022-01-12 ENCOUNTER — OFFICE VISIT (OUTPATIENT)
Dept: PHYSICAL THERAPY | Facility: HOSPITAL | Age: 57
End: 2022-01-12
Attending: INTERNAL MEDICINE
Payer: COMMERCIAL

## 2022-01-12 DIAGNOSIS — C50.919 BREAST CANCER (HCC): ICD-10-CM

## 2022-01-12 DIAGNOSIS — I89.0 LYMPHEDEMA: Primary | ICD-10-CM

## 2022-01-12 PROCEDURE — 97140 MANUAL THERAPY 1/> REGIONS: CPT | Performed by: PHYSICAL THERAPIST

## 2022-01-12 PROCEDURE — 97161 PT EVAL LOW COMPLEX 20 MIN: CPT | Performed by: PHYSICAL THERAPIST

## 2022-01-12 NOTE — PROGRESS NOTES
UE LYMPHEDEMA EVALUATION:     Referring Physician: Dr. Az Ford  Diagnosis: Lymphedema of upper extremity following lymphadenectomy (E89.89,I89.0)      Date of onset: 8/2/2021 Evaluation Date: 1/12/2022     PATIENT SUMMARY   Roxanna Gifford is a 60year old ayan: no       Pt and therapist discussed evaluation findings, lymphedema education, POC and self care/management.  Skilled Physical Therapy is medically necessary for stretching, strengthening, posture re-education,  Complete Decongestive Therapy to red 29. 9   MEASUREMENT F 35.1 35.8 37.3   MEASUREMENT G 39.2 39.2 42.8   MEASUREMENT H 41.9 42.1 43.7   MEASUREMENT I 42.1 42.8 44.2    TOTAL VOLUME  [id number] 8460.33259 7090.14409   % DIFFERENCE 1.89430 -1.57225 -3.42138             Lymphedema Life Impact S compression bandages/garments to facilitate swelling reduction and to be independent at self management once discharged  3) Decrease swelling R breast and trunk to [facility] to decrease risk of infection and improved comfort for patient  4) Decrease cording RUE,

## 2022-01-17 ENCOUNTER — OFFICE VISIT (OUTPATIENT)
Dept: PHYSICAL THERAPY | Facility: HOSPITAL | Age: 57
End: 2022-01-17
Attending: INTERNAL MEDICINE
Payer: COMMERCIAL

## 2022-01-17 DIAGNOSIS — E89.89 LYMPHEDEMA OF UPPER EXTREMITY FOLLOWING LYMPHADENECTOMY: ICD-10-CM

## 2022-01-17 DIAGNOSIS — I89.0 LYMPHEDEMA OF UPPER EXTREMITY FOLLOWING LYMPHADENECTOMY: ICD-10-CM

## 2022-01-17 PROCEDURE — 97140 MANUAL THERAPY 1/> REGIONS: CPT | Performed by: PHYSICAL THERAPIST

## 2022-01-17 NOTE — PATIENT INSTRUCTIONS
Self- Lymphatic Massage for RIGHT Arm, Breast or Trunk Lymphedema     The lymphatic system is part of our circulatory system. It helps balance the fluids of our body and plays an important role in our immune function.  Every day our lymphatic system abso and fingers instead of finger tips  · Use a light pressure  · Gently stretch the skin as far as it goes naturally and then release. · Massage towards the unaffected or alternate lymph nodes  · Self-massage should always be pain free.   · Self-massage may b light    4. axilla  In preparation to redirect fluid in the chest area you gently pump the underarms by   · placing your palm against your underarm rolling your palm up towards your head and into your body.    · Pump or knead your underarm about 10 to 15 ti from the back of your arm to the top of your upper arm. · Stretch the skin and release. · Repeat 15 times      10. Arm  · Gently massage your entire RIGHT arm towards your head.    · Start with your shoulder, and then move to your upper arm elbow and fo

## 2022-01-17 NOTE — PROGRESS NOTES
Referring Physician: Dr. Cresencio Cook  Diagnosis: Lymphedema of upper extremity following lymphadenectomy (D28.43,C74.5)      Date of onset: 8/2/2021 Evaluation Date: 1/12/2022           Precautions:  cancer  Education or treatment limitation: none 23.1 23.8 23.2   MEASUREMENT D 26.9 26.9 27.3   MEASUREMENT E 28.3 28.4 29   MEASUREMENT F 34.8 35.7 37.6   MEASUREMENT G 39.8 38.9 40.6   MEASUREMENT H 41.8 41.2 42.2   MEASUREMENT I 42.6 40.7 42.8    TOTAL VOLUME  [id number] 5060.77153 5925.36336   DATE minutes):  - instr to cont w/ exercises given at screening sessions          Self-Care:  Management/Education: Explained Lymphedema diagnosis; informed patient of lymphedema precautions and risk reduction practices, and importance of skin care.  Discussed a

## 2022-01-18 ENCOUNTER — OFFICE VISIT (OUTPATIENT)
Dept: HEMATOLOGY/ONCOLOGY | Facility: HOSPITAL | Age: 57
End: 2022-01-18
Attending: NURSE PRACTITIONER
Payer: COMMERCIAL

## 2022-01-18 DIAGNOSIS — Z85.3 PERSONAL HISTORY OF BREAST CANCER: Primary | ICD-10-CM

## 2022-01-18 DIAGNOSIS — Z71.9 COUNSELING, UNSPECIFIED: ICD-10-CM

## 2022-01-18 DIAGNOSIS — Z08 ENCOUNTER FOR FOLLOW-UP EXAMINATION AFTER COMPLETED TREATMENT FOR MALIGNANT NEOPLASM: ICD-10-CM

## 2022-01-18 PROCEDURE — 99215 OFFICE O/P EST HI 40 MIN: CPT | Performed by: NURSE PRACTITIONER

## 2022-01-19 NOTE — PROGRESS NOTES
I met with Micaela Dillon for a Survivorship Clinic visit to provide a survivorship care plan (SCP) and information related to post-treatment care. She has a diagnosis of Stage IIA right breast cancer (ER+, UT+, HER2-).   She had a right lumpectomy with sentinel no provided a hard copy of the SCP and a letter that outlined the purpose of the visit and plan. We reviewed all elements of both documents. She is aware that a letter and SCP will be sent to her PCP, Dr. Lena Walters.      Reviewed Cancer Surveillance (off more healthy, incorporating some of Dr. Zakiya George suggestions. We discussed importance of increased exercise, particularly weight bearing exercise and strength training. Strength training may need to wait until she has had more PT.   We talked about yoana

## 2022-01-20 ENCOUNTER — OFFICE VISIT (OUTPATIENT)
Dept: PHYSICAL THERAPY | Facility: HOSPITAL | Age: 57
End: 2022-01-20
Attending: INTERNAL MEDICINE
Payer: COMMERCIAL

## 2022-01-20 PROCEDURE — 97140 MANUAL THERAPY 1/> REGIONS: CPT

## 2022-01-20 NOTE — PROGRESS NOTES
Referring Physician: Dr. John Can  Diagnosis: Lymphedema of upper extremity following lymphadenectomy (B09.38,Y54.6)      Date of onset: 8/2/2021 Evaluation Date: 1/12/2022           Precautions:  cancer  Education or treatment limitation: none MEASUREMENT A 15.8 15.8 16   MEASUREMENT B 19 19.1 19.5   MEASUREMENT C 23.1 23.8 23.2   MEASUREMENT D 26.9 26.9 27.3   MEASUREMENT E 28.3 28.4 29   MEASUREMENT F 34.8 35.7 37.6   MEASUREMENT G 39.8 38.9 40.6   MEASUREMENT H 41.8 41.2 42.2   MEASUREMENT instr to self MLD    Compression:  - awaiting HMO approval    Manual therapy (45 minutes)    -reviewed self MLD and breast massage techniques. To achieve skin stretch. Discussed to try doing MLD in supine, sitting or standing.     STM to areas of cordin Therapeutic Exercise, Neuromuscular Re-education, Orthotic Management and Training        Charges: MM3  Total Timed Treatment: 50 min  Total Treatment Time: 50 min

## 2022-01-24 ENCOUNTER — LAB ENCOUNTER (OUTPATIENT)
Dept: LAB | Facility: HOSPITAL | Age: 57
End: 2022-01-24
Attending: INTERNAL MEDICINE
Payer: COMMERCIAL

## 2022-01-24 ENCOUNTER — OFFICE VISIT (OUTPATIENT)
Dept: PHYSICAL THERAPY | Facility: HOSPITAL | Age: 57
End: 2022-01-24
Attending: INTERNAL MEDICINE
Payer: COMMERCIAL

## 2022-01-24 DIAGNOSIS — I89.0 LYMPHEDEMA OF UPPER EXTREMITY FOLLOWING LYMPHADENECTOMY: ICD-10-CM

## 2022-01-24 DIAGNOSIS — E89.89 LYMPHEDEMA OF UPPER EXTREMITY FOLLOWING LYMPHADENECTOMY: ICD-10-CM

## 2022-01-24 DIAGNOSIS — E03.9 ACQUIRED HYPOTHYROIDISM: ICD-10-CM

## 2022-01-24 LAB
T4 FREE SERPL-MCNC: 1.8 NG/DL (ref 0.8–1.7)
TSI SER-ACNC: 0.16 MIU/ML (ref 0.36–3.74)

## 2022-01-24 PROCEDURE — 36415 COLL VENOUS BLD VENIPUNCTURE: CPT

## 2022-01-24 PROCEDURE — 97140 MANUAL THERAPY 1/> REGIONS: CPT | Performed by: PHYSICAL THERAPIST

## 2022-01-24 PROCEDURE — 84443 ASSAY THYROID STIM HORMONE: CPT

## 2022-01-24 PROCEDURE — 97110 THERAPEUTIC EXERCISES: CPT | Performed by: PHYSICAL THERAPIST

## 2022-01-24 PROCEDURE — 84439 ASSAY OF FREE THYROXINE: CPT

## 2022-01-24 NOTE — PROGRESS NOTES
Referring Physician: Dr. Mendez Ley  Diagnosis: Lymphedema of upper extremity following lymphadenectomy (I77.39,B33.5)      Date of onset: 8/2/2021 Evaluation Date: 1/12/2022       Precautions:  cancer  Education or treatment limitation: none               Phys cuticle 17 cm from 3rd cuticle   RIGHT HAND VOLUME 19.6 across palm 19.6 across palm 19.6 across palm   LEFT HAND VOLUME 19.7 across palm 19.7 across palm 19.7 across palm   MEASUREMENT A 15.8 15.8 16   MEASUREMENT B 19 19.1 19.5   MEASUREMENT C 23.1 23.8 noted    MLD: Neck sequence, and sequence, L axilla, A-A (ant and post), R inguinal, R A-I (supine and sidely), R breast, R axilla, RUE.  Deep technique to R breast area of fibrosis    Initiated instr to self MLD    Compression:  - awaiting HMO approval precautions and risk reduction practices, and importance of skin care. Discussed and demonstrated bandaging and compression options including various vendors that can be utilized              Assessment: less pain w/ ROM after treatment.  Area of cording do

## 2022-01-25 ENCOUNTER — LAB ENCOUNTER (OUTPATIENT)
Dept: LAB | Facility: HOSPITAL | Age: 57
End: 2022-01-25
Attending: INTERNAL MEDICINE
Payer: COMMERCIAL

## 2022-01-25 DIAGNOSIS — Z12.11 COLON CANCER SCREENING: ICD-10-CM

## 2022-01-25 PROCEDURE — 82274 ASSAY TEST FOR BLOOD FECAL: CPT

## 2022-01-26 ENCOUNTER — OFFICE VISIT (OUTPATIENT)
Dept: PHYSICAL THERAPY | Facility: HOSPITAL | Age: 57
End: 2022-01-26
Attending: INTERNAL MEDICINE
Payer: COMMERCIAL

## 2022-01-26 LAB — HEMOCCULT STL QL: POSITIVE

## 2022-01-26 NOTE — PROGRESS NOTES
Referring Physician: Dr. Tita Freeman  Diagnosis: Lymphedema of upper extremity following lymphadenectomy (H25.64,F12.8)      Date of onset: 8/2/2021 Evaluation Date: 1/12/2022       Precautions:  cancer  Education or treatment limitation: none               Phys 7/30/2021   LOCATION/MEASUREMENTS RUE RUE RUE   PATIENT POSITION  supine supine supine   LIMB POSITION 75 degrees abd 75 degrees abd 75 degrees abd   STARTING POINT 17 cm from 3rd cuticle 17 cm from 3rd cuticle 17 cm from 3rd cuticle   RIGHT HAND VOLUME 19 referral  - measurements for compression sleeve and gauntlet:  Palm 19.5  Wrist 16.1  Forearm 27.7  Upper arm 40.1  Length 40  (1 black and 1 beige)      Manual Therapy (70 min)    STM to areas of cording R UE.  Several small cavitations noted    MLD: Neck breast area of fibrosis.  Significant time on MLD of breast and over areas of cording    Compression:  - will awaiting delivery of sleeve    There Ex (  minutes):  - instr to cont w/ exercises given at screening sessions    There Ex (5 minutes)  -continue w min

## 2022-01-31 ENCOUNTER — OFFICE VISIT (OUTPATIENT)
Dept: PHYSICAL THERAPY | Facility: HOSPITAL | Age: 57
End: 2022-01-31
Attending: INTERNAL MEDICINE
Payer: COMMERCIAL

## 2022-01-31 DIAGNOSIS — C50.919 BREAST CANCER (HCC): ICD-10-CM

## 2022-01-31 DIAGNOSIS — I89.0 LYMPHEDEMA: Primary | ICD-10-CM

## 2022-01-31 PROCEDURE — 97140 MANUAL THERAPY 1/> REGIONS: CPT | Performed by: PHYSICAL THERAPIST

## 2022-01-31 NOTE — PROGRESS NOTES
Referring Physician: Dr. John Can  Diagnosis: Lymphedema of upper extremity following lymphadenectomy (Z00.64,B83.7)      Date of onset: 8/2/2021 Evaluation Date: 1/12/2022       Precautions:  cancer  Education or treatment limitation: none               Phys inner forearm, but it does not restrict ROM  - mild swelling R breast and trunk (subjective c/o trunk swelling)  - small skin irritation (redness, appears ingrown hair) R inferior breast  Stemmer's Sign: negative    Arm Volume Measurements:   Lymphedema Ca sidely), R breast, R axilla, RUE. Deep technique to R breast area of fibrosis    Compression:  - awaiting delivery of garments (hope to come this week)  - reviewed donning/doffing of compression sleeve and gauntlet.  Instr pt to use when she gets it, but if swelling reduction and to be independent at self management once discharged  3) Decrease swelling R breast and trunk to [facility] to decrease risk of infection and improved comfort for patient  4) Decrease cording RUE, improve tissue mobility RUE and axilla to

## 2022-02-04 ENCOUNTER — OFFICE VISIT (OUTPATIENT)
Dept: PHYSICAL THERAPY | Facility: HOSPITAL | Age: 57
End: 2022-02-04
Attending: INTERNAL MEDICINE
Payer: COMMERCIAL

## 2022-02-04 PROCEDURE — 97140 MANUAL THERAPY 1/> REGIONS: CPT | Performed by: PHYSICAL THERAPIST

## 2022-02-08 ENCOUNTER — LAB ENCOUNTER (OUTPATIENT)
Dept: LAB | Facility: HOSPITAL | Age: 57
End: 2022-02-08
Attending: INTERNAL MEDICINE
Payer: COMMERCIAL

## 2022-02-08 ENCOUNTER — OFFICE VISIT (OUTPATIENT)
Dept: PHYSICAL THERAPY | Facility: HOSPITAL | Age: 57
End: 2022-02-08
Attending: INTERNAL MEDICINE
Payer: COMMERCIAL

## 2022-02-08 DIAGNOSIS — R19.5 POSITIVE FIT (FECAL IMMUNOCHEMICAL TEST): ICD-10-CM

## 2022-02-08 DIAGNOSIS — E03.9 HYPOTHYROIDISM, UNSPECIFIED TYPE: ICD-10-CM

## 2022-02-08 LAB
BASOPHILS # BLD AUTO: 0.04 X10(3) UL (ref 0–0.2)
BASOPHILS NFR BLD AUTO: 0.7 %
DEPRECATED RDW RBC AUTO: 43.8 FL (ref 35.1–46.3)
EOSINOPHIL # BLD AUTO: 0.16 X10(3) UL (ref 0–0.7)
EOSINOPHIL NFR BLD AUTO: 2.9 %
ERYTHROCYTE [DISTWIDTH] IN BLOOD BY AUTOMATED COUNT: 13.4 % (ref 11–15)
FOLATE SERPL-MCNC: 12.2 NG/ML (ref 8.7–?)
HCT VFR BLD AUTO: 38.9 %
HGB BLD-MCNC: 12.4 G/DL
IMM GRANULOCYTES # BLD AUTO: 0.01 X10(3) UL (ref 0–1)
IMM GRANULOCYTES NFR BLD: 0.2 %
IRON SATN MFR SERPL: 14 %
IRON SERPL-MCNC: 60 UG/DL
LYMPHOCYTES # BLD AUTO: 1.15 X10(3) UL (ref 1–4)
LYMPHOCYTES NFR BLD AUTO: 21.1 %
MCH RBC QN AUTO: 28.3 PG (ref 26–34)
MCHC RBC AUTO-ENTMCNC: 31.9 G/DL (ref 31–37)
MCV RBC AUTO: 88.8 FL
MONOCYTES # BLD AUTO: 0.58 X10(3) UL (ref 0.1–1)
MONOCYTES NFR BLD AUTO: 10.6 %
NEUTROPHILS # BLD AUTO: 3.51 X10 (3) UL (ref 1.5–7.7)
NEUTROPHILS # BLD AUTO: 3.51 X10(3) UL (ref 1.5–7.7)
NEUTROPHILS NFR BLD AUTO: 64.5 %
RBC # BLD AUTO: 4.38 X10(6)UL
TIBC SERPL-MCNC: 431 UG/DL (ref 240–450)
TRANSFERRIN SERPL-MCNC: 289 MG/DL (ref 200–360)
TSI SER-ACNC: 0.27 MIU/ML (ref 0.36–3.74)
VIT B12 SERPL-MCNC: 407 PG/ML (ref 193–986)
WBC # BLD AUTO: 5.5 X10(3) UL (ref 4–11)

## 2022-02-08 PROCEDURE — 83540 ASSAY OF IRON: CPT

## 2022-02-08 PROCEDURE — 36415 COLL VENOUS BLD VENIPUNCTURE: CPT

## 2022-02-08 PROCEDURE — 82746 ASSAY OF FOLIC ACID SERUM: CPT

## 2022-02-08 PROCEDURE — 97140 MANUAL THERAPY 1/> REGIONS: CPT | Performed by: PHYSICAL THERAPIST

## 2022-02-08 PROCEDURE — 82607 VITAMIN B-12: CPT

## 2022-02-08 PROCEDURE — 84466 ASSAY OF TRANSFERRIN: CPT

## 2022-02-08 PROCEDURE — 85025 COMPLETE CBC W/AUTO DIFF WBC: CPT

## 2022-02-08 PROCEDURE — 84443 ASSAY THYROID STIM HORMONE: CPT

## 2022-02-10 DIAGNOSIS — E03.9 HYPOTHYROIDISM, UNSPECIFIED TYPE: Primary | ICD-10-CM

## 2022-02-10 RX ORDER — LEVOTHYROXINE SODIUM 175 UG/1
175 TABLET ORAL
Qty: 90 TABLET | Refills: 1 | OUTPATIENT
Start: 2022-02-10

## 2022-02-10 RX ORDER — LEVOTHYROXINE SODIUM 137 UG/1
137 TABLET ORAL
Qty: 90 TABLET | Refills: 0 | Status: SHIPPED | OUTPATIENT
Start: 2022-02-10 | End: 2022-05-06

## 2022-02-10 NOTE — TELEPHONE ENCOUNTER
Please review. Protocol Failed or has No Protocol. Requested Prescriptions   Pending Prescriptions Disp Refills    LEVOTHYROXINE 175 MCG Oral Tab [Pharmacy Med Name: LEVOTHYROXINE 0.175MG (175MCG) TABS] 90 tablet 0     Sig: TAKE 1 TABLET(175 MCG) BY MOUTH BEFORE BREAKFAST        Thyroid Medication Protocol Failed - 2/10/2022  1:50 PM        Failed - Last TSH value is normal     Lab Results   Component Value Date    TSH 0.265 (L) 02/08/2022                 Passed - TSH in past 12 months        Passed - Appointment in past 12 or next 3 months              Future Appointments         Provider Department Appt Notes    Tomorrow [facility], [address] [facility] 8v  1/7/22 to 4/7/22  c/p $50, 60v pcy    In 1 week [facility], [address] [facility] 8v  1/7/22 to 4/7/22  c/p $50, 60v pcy    In 2 weeks Sherry Rosario, PTA [address] [facility] 8v  1/7/22 to 4/7/22  c/p $50, 60v pcy    In 2 weeks [facility], [address] [facility] 8v  1/7/22 to 4/7/22  c/p $50, 60v pcy    In 4 weeks Sherry Rosario, PTA [address] [facility] 8v  1/7/22 to 4/7/22  c/p $50, 60v pcy    In 1 month Kathy Busch , [address] colon screen/ blood in stool / informed of policies    In 1 month [facility], [address] [facility] 8v  1/7/22 to 4/7/22  c/p $50, 60v pcy    In 2 months [facility] [address] [facility]     In 3 months Vinay Terrell MD; [facility] RAD ONC RN [address] f/u - pt needs early am due to wrk     In 4 months Steve Gross 19 Hematology Oncology FOLLOW UP VISIT. CL  6M            Recent Outpatient Visits              2 days ago     [address] [place], [facility]    Office Visit    6 days ago     [address] [place]    [facility]    1 week ago [address] [place], [place]    Office Visit    2 weeks ago     [address] [place], [place]    Office Visit    2 weeks ago Lymphedema of upper extremity following lymphadenectomy    [facility] [place]    Office Visit

## 2022-02-11 ENCOUNTER — OFFICE VISIT (OUTPATIENT)
Dept: PHYSICAL THERAPY | Facility: HOSPITAL | Age: 57
End: 2022-02-11
Attending: INTERNAL MEDICINE
Payer: COMMERCIAL

## 2022-02-11 PROCEDURE — 97140 MANUAL THERAPY 1/> REGIONS: CPT | Performed by: PHYSICAL THERAPIST

## 2022-02-18 ENCOUNTER — OFFICE VISIT (OUTPATIENT)
Dept: PHYSICAL THERAPY | Facility: HOSPITAL | Age: 57
End: 2022-02-18
Attending: INTERNAL MEDICINE
Payer: COMMERCIAL

## 2022-02-18 PROCEDURE — 97140 MANUAL THERAPY 1/> REGIONS: CPT | Performed by: PHYSICAL THERAPIST

## 2022-02-24 ENCOUNTER — OFFICE VISIT (OUTPATIENT)
Dept: PHYSICAL THERAPY | Facility: HOSPITAL | Age: 57
End: 2022-02-24
Attending: INTERNAL MEDICINE
Payer: COMMERCIAL

## 2022-02-24 PROCEDURE — 97140 MANUAL THERAPY 1/> REGIONS: CPT

## 2022-02-24 PROCEDURE — 97110 THERAPEUTIC EXERCISES: CPT

## 2022-03-02 ENCOUNTER — OFFICE VISIT (OUTPATIENT)
Dept: PHYSICAL THERAPY | Facility: HOSPITAL | Age: 57
End: 2022-03-02
Attending: INTERNAL MEDICINE
Payer: COMMERCIAL

## 2022-03-02 PROCEDURE — 97140 MANUAL THERAPY 1/> REGIONS: CPT | Performed by: PHYSICAL THERAPIST

## 2022-03-10 ENCOUNTER — OFFICE VISIT (OUTPATIENT)
Dept: PHYSICAL THERAPY | Facility: HOSPITAL | Age: 57
End: 2022-03-10
Attending: INTERNAL MEDICINE
Payer: COMMERCIAL

## 2022-03-10 PROCEDURE — 97140 MANUAL THERAPY 1/> REGIONS: CPT

## 2022-03-15 ENCOUNTER — OFFICE VISIT (OUTPATIENT)
Dept: GASTROENTEROLOGY | Facility: CLINIC | Age: 57
End: 2022-03-15
Payer: COMMERCIAL

## 2022-03-15 ENCOUNTER — TELEPHONE (OUTPATIENT)
Dept: GASTROENTEROLOGY | Facility: CLINIC | Age: 57
End: 2022-03-15

## 2022-03-15 VITALS
HEART RATE: 68 BPM | BODY MASS INDEX: 39.65 KG/M2 | SYSTOLIC BLOOD PRESSURE: 135 MMHG | DIASTOLIC BLOOD PRESSURE: 73 MMHG | HEIGHT: 61 IN | WEIGHT: 210 LBS

## 2022-03-15 DIAGNOSIS — R19.5 POSITIVE FIT (FECAL IMMUNOCHEMICAL TEST): Primary | ICD-10-CM

## 2022-03-15 PROCEDURE — 99243 OFF/OP CNSLTJ NEW/EST LOW 30: CPT | Performed by: NURSE PRACTITIONER

## 2022-03-15 PROCEDURE — 3075F SYST BP GE 130 - 139MM HG: CPT | Performed by: NURSE PRACTITIONER

## 2022-03-15 PROCEDURE — 3008F BODY MASS INDEX DOCD: CPT | Performed by: NURSE PRACTITIONER

## 2022-03-15 PROCEDURE — 3078F DIAST BP <80 MM HG: CPT | Performed by: NURSE PRACTITIONER

## 2022-03-15 RX ORDER — POLYETHYLENE GLYCOL 3350, SODIUM CHLORIDE, SODIUM BICARBONATE, POTASSIUM CHLORIDE 420; 11.2; 5.72; 1.48 G/4L; G/4L; G/4L; G/4L
POWDER, FOR SOLUTION ORAL
Qty: 4000 ML | Refills: 0 | Status: SHIPPED | OUTPATIENT
Start: 2022-03-15

## 2022-03-15 NOTE — PATIENT INSTRUCTIONS
1. Schedule colonoscopy with MAC w/ first avail md [Diagnosis: pos fit]    2.  bowel prep from pharmacy (split trilyte)    3. Continue all medications for procedure    4. Read all bowel prep instructions carefully    5. AVOID seeds, nuts, popcorn, raw fruits and vegetables (cooked is okay) for 2-3 days before procedure    6. You will need to be tested for COVID within 72 hours of your procedure. You will be contacted with instructions on how to do this.       >>>Please note: if you were prescribed Suprep for the bowel prep and it is too expensive or not covered by insurance, it is okay to substitute Trilyte (or any similar generic prep). This can be done by notifying the pharmacy or calling our office.

## 2022-03-15 NOTE — TELEPHONE ENCOUNTER
Scheduled for:  Colonoscopy [address]  Provider Name:  Dr Susana Archer  Date:  04/15/2022  Location:  [facility]  Sedation:  MAC  Time:  2353 (pt is aware to arrive at [facility])    Prep:  Colyte  Meds/Allergies Reconciled?:  Physician reviewed  Diagnosis with codes:  Positive FIT R19.5  Was patient informed to call insurance with codes (Y/N):  Y     Referral sent?:  NA  [address] [place] [address] or [facility] notified?:  I sent an electronic request to Endo Scheduling and received a confirmation today. Medication Orders:  Pt is aware to NOT take iron pills, herbal meds and diet supplements for 7 days before exam. Also to NOT take any form of alcohol, recreational drugs and any forms of ED meds 24 hours before exam.     Misc Orders:       Further instructions given by staff:  I discussed the prep intructions with the patient in office which she verbally understood. Copy of instructions was handed to patient as well. Patient was also advised he will receive a call from PAT nurse 72-24 hours prior procedure to schedule Covid test done.

## 2022-03-23 ENCOUNTER — OFFICE VISIT (OUTPATIENT)
Dept: PHYSICAL THERAPY | Facility: HOSPITAL | Age: 57
End: 2022-03-23
Attending: INTERNAL MEDICINE
Payer: COMMERCIAL

## 2022-03-23 ENCOUNTER — LAB ENCOUNTER (OUTPATIENT)
Dept: LAB | Facility: HOSPITAL | Age: 57
End: 2022-03-23
Attending: NURSE PRACTITIONER
Payer: COMMERCIAL

## 2022-03-23 DIAGNOSIS — E03.9 HYPOTHYROIDISM, UNSPECIFIED TYPE: ICD-10-CM

## 2022-03-23 LAB
T4 FREE SERPL-MCNC: 1.5 NG/DL (ref 0.8–1.7)
TSI SER-ACNC: 0.48 MIU/ML (ref 0.36–3.74)

## 2022-03-23 PROCEDURE — 84439 ASSAY OF FREE THYROXINE: CPT

## 2022-03-23 PROCEDURE — 36415 COLL VENOUS BLD VENIPUNCTURE: CPT

## 2022-03-23 PROCEDURE — 84443 ASSAY THYROID STIM HORMONE: CPT

## 2022-03-31 ENCOUNTER — OFFICE VISIT (OUTPATIENT)
Dept: PHYSICAL THERAPY | Facility: HOSPITAL | Age: 57
End: 2022-03-31
Attending: INTERNAL MEDICINE
Payer: COMMERCIAL

## 2022-03-31 PROCEDURE — 97140 MANUAL THERAPY 1/> REGIONS: CPT | Performed by: PHYSICAL THERAPIST

## 2022-04-06 ENCOUNTER — TELEPHONE (OUTPATIENT)
Dept: CASE MANAGEMENT | Age: 57
End: 2022-04-06

## 2022-04-06 NOTE — TELEPHONE ENCOUNTER
Dr. Lex Cerna requesting referral for CPAP supplies. Pended referral please review diagnosis and sign off if you agree. Thank you.   Sylvain Barbosa

## 2022-04-14 ENCOUNTER — TELEPHONE (OUTPATIENT)
Dept: GASTROENTEROLOGY | Facility: CLINIC | Age: 57
End: 2022-04-14

## 2022-04-14 NOTE — TELEPHONE ENCOUNTER
Pt has CLN scheduled for tomorrow and states \" I did not have a clear liquid diet today. \" Please call [phone]

## 2022-04-14 NOTE — TELEPHONE ENCOUNTER
Contacted patient who states she did not read her instructions until just now. She did not follow a clear liquid diet today. Had fruit smoothie, carrots, hummus, apple and other foods. I informed patient we would need to reschedule procedure for adequate prep. Cancelled in epic, Iron Falcon in endoscopy was notified. GI Schedulers--    Please see TE 3/15/22 to reschedule patient. Patient cancelled in Epic and endo notified.

## 2022-04-15 NOTE — TELEPHONE ENCOUNTER
Dr Cesario Lawson please see pended order. Suturegard Body: The suture ends were repeatedly re-tightened and re-clamped to achieve the desired tissue expansion.

## 2022-04-15 NOTE — TELEPHONE ENCOUNTER
Scheduled for:  Colonoscopy [id number]  Provider Name:  Dr. Jamey Umaña  Date:  7/18/22  Location:    [facility]  Sedation:  MAC  Time:  1430 (pt is aware to arrive at 1330)   Prep:  Trilyte, sent via [facility] [address] on 4/15/22  Meds/Allergies Reconciled?:  Physician reviewed   Diagnosis with codes:  Positive Fit R19.5  Was patient informed to call insurance with codes (Y/N):  Yes, I confirmed [facility] insurance with the patient. Referral sent?:  Referral was sent at the time of electronic surgical scheduling. [facility] or [address] notified?:  I sent an electronic request to Endo Scheduling and received a confirmation today. Medication Orders: This patient verbally confirmed that she is not taking:   Iron, blood thinners, BP meds, and is not diabetic   Not latex allergy, Not PCN allergy and does not have a pacemaker   This patient is aware to HOLD all supplements x 7 days before the procedure. Misc Orders:       Further instructions given by staff:    This patient had no questions regarding the prep instructions

## 2022-04-23 ENCOUNTER — PATIENT MESSAGE (OUTPATIENT)
Dept: INTERNAL MEDICINE CLINIC | Facility: CLINIC | Age: 57
End: 2022-04-23

## 2022-04-25 NOTE — TELEPHONE ENCOUNTER
From: Benji Morejon  To: Rinku Castaneda MD  Sent: 4/23/2022 8:53 PM CDT  Subject: Referral for CPAP supplies    Hi Dr. Sandy Dang has reached out to me to request a referral from you for supplies for my CPAP machine. Jake Fuentes told them they are waiting from a response from you. If you get a few moments, would you please check into this.    Thank you,  Clarice Gavin

## 2022-04-26 NOTE — TELEPHONE ENCOUNTER
The request has been sent to the health plan.     Thank you,  [facility]   Referral specialist

## 2022-04-29 ENCOUNTER — OFFICE VISIT (OUTPATIENT)
Dept: PHYSICAL THERAPY | Facility: HOSPITAL | Age: 57
End: 2022-04-29
Attending: INTERNAL MEDICINE
Payer: COMMERCIAL

## 2022-04-29 PROCEDURE — 97140 MANUAL THERAPY 1/> REGIONS: CPT | Performed by: PHYSICAL THERAPIST

## 2022-05-02 ENCOUNTER — HOSPITAL ENCOUNTER (OUTPATIENT)
Dept: MAMMOGRAPHY | Facility: HOSPITAL | Age: 57
Discharge: HOME | End: 2022-05-02
Attending: SURGERY
Payer: COMMERCIAL

## 2022-05-02 DIAGNOSIS — Z17.0 MALIGNANT NEOPLASM OF UPPER-OUTER QUADRANT OF RIGHT BREAST IN FEMALE, ESTROGEN RECEPTOR POSITIVE (HCC): ICD-10-CM

## 2022-05-02 DIAGNOSIS — C50.411 MALIGNANT NEOPLASM OF UPPER-OUTER QUADRANT OF RIGHT BREAST IN FEMALE, ESTROGEN RECEPTOR POSITIVE (HCC): ICD-10-CM

## 2022-05-02 PROCEDURE — 77062 BREAST TOMOSYNTHESIS BI: CPT | Performed by: SURGERY

## 2022-05-02 PROCEDURE — 77066 DX MAMMO INCL CAD BI: CPT | Performed by: SURGERY

## 2022-05-06 RX ORDER — LEVOTHYROXINE SODIUM 137 UG/1
137 TABLET ORAL
Qty: 90 TABLET | Refills: 0 | Status: SHIPPED | OUTPATIENT
Start: 2022-05-06

## 2022-05-06 NOTE — TELEPHONE ENCOUNTER
Refill passed per [address] Rizwana Fuentes protocol. Requested Prescriptions   Pending Prescriptions Disp Refills    LEVOTHYROXINE 137 MCG Oral Tab [Pharmacy Med Name: LEVOTHYROXINE 0.137MG (137MCG) TAB] 90 tablet 0     Sig: TAKE 1 TABLET BY MOUTH BEFORE BREAKFAST        Thyroid Medication Protocol Failed - 5/6/2022  4:46 PM        Failed - Appointment in past 12 or next 3 months        Passed - TSH in past 12 months        Passed - Last TSH value is normal     Lab Results   Component Value Date    TSH 0.483 03/23/2022                       Future Appointments         Provider Department Appt Notes    In 2 weeks Meredith Sosa MD; [facility] RAD [address] f/u - pt needs early am due to wrk     In 1 month Steve Hyde 19 Hematology Oncology FOLLOW UP VISIT. CL  6M    In 2 months ANA, PROCEDURE [address] GI PROCEDURE Colon @ [facility]            Recent Outpatient Visits              1 week ago     [address], [place]    Office Visit    1 month ago     [address], [place]    Office Visit    1 month ago     [address], [place]    Office Visit    1 month ago Positive FIT (fecal immunochemical test)    [address] [facility] [place], [facility], Dennis Rodríguez APRN    Office Visit    1 month ago     [address], [place]    Office Visit

## 2022-05-25 ENCOUNTER — OFFICE VISIT (OUTPATIENT)
Dept: RADIATION ONCOLOGY | Facility: HOSPITAL | Age: 57
End: 2022-05-25
Attending: INTERNAL MEDICINE
Payer: COMMERCIAL

## 2022-05-25 VITALS
SYSTOLIC BLOOD PRESSURE: 132 MMHG | RESPIRATION RATE: 16 BRPM | TEMPERATURE: 98 F | DIASTOLIC BLOOD PRESSURE: 66 MMHG | OXYGEN SATURATION: 99 % | HEART RATE: 61 BPM

## 2022-05-25 DIAGNOSIS — Z17.0 MALIGNANT NEOPLASM OF UPPER-OUTER QUADRANT OF RIGHT BREAST IN FEMALE, ESTROGEN RECEPTOR POSITIVE (HCC): Primary | ICD-10-CM

## 2022-05-25 DIAGNOSIS — C50.411 MALIGNANT NEOPLASM OF UPPER-OUTER QUADRANT OF RIGHT BREAST IN FEMALE, ESTROGEN RECEPTOR POSITIVE (HCC): Primary | ICD-10-CM

## 2022-05-25 PROCEDURE — 99211 OFF/OP EST MAY X REQ PHY/QHP: CPT

## 2022-05-25 NOTE — PATIENT INSTRUCTIONS
Follow up with Dr. Angel Levi in May 2023. Jena Mg will call you to schedule your follow up appointment. Please call [phone] with any radiation questions. Follow up with Dr. Lauran Opitz in November after your mammogram.  Call her office to make an appointment.

## 2022-06-06 ENCOUNTER — OFFICE VISIT (OUTPATIENT)
Dept: FAMILY MEDICINE CLINIC | Facility: CLINIC | Age: 57
End: 2022-06-06
Payer: COMMERCIAL

## 2022-06-06 VITALS
TEMPERATURE: 99 F | HEART RATE: 76 BPM | RESPIRATION RATE: 16 BRPM | BODY MASS INDEX: 39.65 KG/M2 | SYSTOLIC BLOOD PRESSURE: 135 MMHG | WEIGHT: 210 LBS | OXYGEN SATURATION: 99 % | HEIGHT: 61 IN | DIASTOLIC BLOOD PRESSURE: 59 MMHG

## 2022-06-06 DIAGNOSIS — R30.0 DYSURIA: Primary | ICD-10-CM

## 2022-06-06 LAB
BILIRUBIN: NEGATIVE
GLUCOSE (URINE DIPSTICK): NEGATIVE MG/DL
KETONES (URINE DIPSTICK): NEGATIVE MG/DL
MULTISTIX LOT#: ABNORMAL NUMERIC
NITRITE, URINE: NEGATIVE
PH, URINE: 7.5 (ref 4.5–8)
PROTEIN (URINE DIPSTICK): NEGATIVE MG/DL
SPECIFIC GRAVITY: 1.01 (ref 1–1.03)
URINE-COLOR: YELLOW
UROBILINOGEN,SEMI-QN: 0.2 MG/DL (ref 0–1.9)

## 2022-06-06 PROCEDURE — 87086 URINE CULTURE/COLONY COUNT: CPT | Performed by: NURSE PRACTITIONER

## 2022-06-06 RX ORDER — NITROFURANTOIN 25; 75 MG/1; MG/1
100 CAPSULE ORAL 2 TIMES DAILY
Qty: 14 CAPSULE | Refills: 0 | Status: SHIPPED | OUTPATIENT
Start: 2022-06-06 | End: 2022-06-13

## 2022-06-20 ENCOUNTER — OFFICE VISIT (OUTPATIENT)
Dept: HEMATOLOGY/ONCOLOGY | Facility: HOSPITAL | Age: 57
End: 2022-06-20
Attending: NURSE PRACTITIONER
Payer: COMMERCIAL

## 2022-06-20 VITALS
SYSTOLIC BLOOD PRESSURE: 126 MMHG | HEIGHT: 61 IN | TEMPERATURE: 99 F | RESPIRATION RATE: 16 BRPM | BODY MASS INDEX: 39.08 KG/M2 | WEIGHT: 207 LBS | OXYGEN SATURATION: 95 % | DIASTOLIC BLOOD PRESSURE: 59 MMHG | HEART RATE: 75 BPM

## 2022-06-20 DIAGNOSIS — Z17.0 MALIGNANT NEOPLASM OF UPPER-OUTER QUADRANT OF RIGHT BREAST IN FEMALE, ESTROGEN RECEPTOR POSITIVE (HCC): Primary | ICD-10-CM

## 2022-06-20 DIAGNOSIS — Z08 ENCOUNTER FOR FOLLOW-UP EXAMINATION AFTER COMPLETED TREATMENT FOR MALIGNANT NEOPLASM: ICD-10-CM

## 2022-06-20 DIAGNOSIS — C50.411 MALIGNANT NEOPLASM OF UPPER-OUTER QUADRANT OF RIGHT BREAST IN FEMALE, ESTROGEN RECEPTOR POSITIVE (HCC): Primary | ICD-10-CM

## 2022-06-20 DIAGNOSIS — Z79.811 ENCOUNTER FOR MONITORING AROMATASE INHIBITOR THERAPY: ICD-10-CM

## 2022-06-20 DIAGNOSIS — Z51.81 ENCOUNTER FOR MONITORING AROMATASE INHIBITOR THERAPY: ICD-10-CM

## 2022-06-20 PROCEDURE — 99214 OFFICE O/P EST MOD 30 MIN: CPT | Performed by: INTERNAL MEDICINE

## 2022-06-20 RX ORDER — LETROZOLE 2.5 MG/1
2.5 TABLET, FILM COATED ORAL DAILY
Qty: 90 TABLET | Refills: 3 | Status: SHIPPED | OUTPATIENT
Start: 2022-06-20

## 2022-07-13 ENCOUNTER — TELEPHONE (OUTPATIENT)
Dept: INTERNAL MEDICINE CLINIC | Facility: CLINIC | Age: 57
End: 2022-07-13

## 2022-07-13 DIAGNOSIS — G47.33 OBSTRUCTIVE SLEEP APNEA: Primary | ICD-10-CM

## 2022-07-13 NOTE — TELEPHONE ENCOUNTER
please advise [facility] requesting referral for cpap supplies I have pended please review and sign thank you

## 2022-07-18 ENCOUNTER — ANESTHESIA EVENT (OUTPATIENT)
Dept: ENDOSCOPY | Facility: HOSPITAL | Age: 57
End: 2022-07-18
Payer: COMMERCIAL

## 2022-07-18 ENCOUNTER — ANESTHESIA (OUTPATIENT)
Dept: ENDOSCOPY | Facility: HOSPITAL | Age: 57
End: 2022-07-18
Payer: COMMERCIAL

## 2022-07-18 ENCOUNTER — HOSPITAL ENCOUNTER (OUTPATIENT)
Facility: HOSPITAL | Age: 57
Setting detail: HOSPITAL OUTPATIENT SURGERY
Discharge: HOME | End: 2022-07-18
Attending: INTERNAL MEDICINE | Admitting: INTERNAL MEDICINE
Payer: COMMERCIAL

## 2022-07-18 VITALS
TEMPERATURE: 98 F | HEIGHT: 61 IN | RESPIRATION RATE: 23 BRPM | WEIGHT: 210 LBS | HEART RATE: 79 BPM | DIASTOLIC BLOOD PRESSURE: 74 MMHG | OXYGEN SATURATION: 95 % | SYSTOLIC BLOOD PRESSURE: 121 MMHG | BODY MASS INDEX: 39.65 KG/M2

## 2022-07-18 DIAGNOSIS — R19.5 POSITIVE FIT (FECAL IMMUNOCHEMICAL TEST): ICD-10-CM

## 2022-07-18 PROCEDURE — 45385 COLONOSCOPY W/LESION REMOVAL: CPT | Performed by: INTERNAL MEDICINE

## 2022-07-18 PROCEDURE — 0DBN8ZX EXCISION OF SIGMOID COLON, VIA NATURAL OR ARTIFICIAL OPENING ENDOSCOPIC, DIAGNOSTIC: ICD-10-PCS | Performed by: INTERNAL MEDICINE

## 2022-07-18 PROCEDURE — 45380 COLONOSCOPY AND BIOPSY: CPT | Performed by: INTERNAL MEDICINE

## 2022-07-18 PROCEDURE — 0DBP8ZX EXCISION OF RECTUM, VIA NATURAL OR ARTIFICIAL OPENING ENDOSCOPIC, DIAGNOSTIC: ICD-10-PCS | Performed by: INTERNAL MEDICINE

## 2022-07-18 RX ORDER — SODIUM CHLORIDE, SODIUM LACTATE, POTASSIUM CHLORIDE, CALCIUM CHLORIDE 600; 310; 30; 20 MG/100ML; MG/100ML; MG/100ML; MG/100ML
INJECTION, SOLUTION INTRAVENOUS CONTINUOUS
Status: DISCONTINUED | OUTPATIENT
Start: 2022-07-18 | End: 2022-07-18

## 2022-07-18 RX ORDER — LIDOCAINE HYDROCHLORIDE 10 MG/ML
INJECTION, SOLUTION EPIDURAL; INFILTRATION; INTRACAUDAL; PERINEURAL AS NEEDED
Status: DISCONTINUED | OUTPATIENT
Start: 2022-07-18 | End: 2022-07-18 | Stop reason: SURG

## 2022-07-18 RX ADMIN — SODIUM CHLORIDE, SODIUM LACTATE, POTASSIUM CHLORIDE, CALCIUM CHLORIDE: 600; 310; 30; 20 INJECTION, SOLUTION INTRAVENOUS at 13:55:00

## 2022-07-18 RX ADMIN — SODIUM CHLORIDE, SODIUM LACTATE, POTASSIUM CHLORIDE, CALCIUM CHLORIDE: 600; 310; 30; 20 INJECTION, SOLUTION INTRAVENOUS at 14:21:00

## 2022-07-18 RX ADMIN — LIDOCAINE HYDROCHLORIDE 50 MG: 10 INJECTION, SOLUTION EPIDURAL; INFILTRATION; INTRACAUDAL; PERINEURAL at 13:59:00

## 2022-07-18 NOTE — ANESTHESIA POSTPROCEDURE EVALUATION
Patient:  Andrez Caceres    Procedure Summary     Date: 07/18/22 Room / Location: [address] [place] [address] ENDOSCOPY 01 / [address] [place] [address] ENDOSCOPY    Anesthesia Start: 6714 Anesthesia Stop: 4699    Procedure: COLONOSCOPY (N/A ) Diagnosis:       Positive FIT (fecal immunochemical test)      (Colon Polyps)    Surgeons: Cyndy Henry MD Anesthesiologist: Fortunato Zheng CRNA    Anesthesia Type: MAC ASA Status: 2          Anesthesia Type: MAC    Vitals Value Taken Time   BP 95/74 07/18/22 1424   Temp 36.8 07/18/22 1424   Pulse 74 07/18/22 1424   Resp 14 07/18/22 1424   SpO2 99 07/18/22 1424       [address] [place] [address] AN Post Evaluation:   Patient Evaluated in PACU  Patient Participation: complete - patient participated  Level of Consciousness: responsive to verbal stimuli  Pain Management: adequate  Airway Patency:patent  Dental exam unchanged from preop  Yes    Cardiovascular Status: acceptable  Respiratory Status: acceptable  Postoperative Hydration acceptable      Christine Pisano CRNA  7/18/2022 2:24 PM

## 2022-07-19 ENCOUNTER — TELEPHONE (OUTPATIENT)
Dept: GASTROENTEROLOGY | Facility: CLINIC | Age: 57
End: 2022-07-19

## 2022-07-19 NOTE — TELEPHONE ENCOUNTER
GI staff: please place recall for colonoscopy in 3 years     Then close encounter    Thanks    Franklin Woo MD  [facility] - Gastroenterology  7/19/2022  12:26 PM

## 2022-07-19 NOTE — TELEPHONE ENCOUNTER
Health maintenance updated. Last colonoscopy done 7/18/22. 3 year recall placed into [facility], next due on 7/18/25 per Dr. Yeimy Crum.

## 2022-08-04 DIAGNOSIS — E03.9 HYPOTHYROIDISM, UNSPECIFIED TYPE: ICD-10-CM

## 2022-08-04 RX ORDER — LEVOTHYROXINE SODIUM 137 UG/1
137 TABLET ORAL
Qty: 90 TABLET | Refills: 0 | Status: SHIPPED | OUTPATIENT
Start: 2022-08-04 | End: 2022-10-31

## 2022-08-04 NOTE — TELEPHONE ENCOUNTER
1st attempt - [employer] message sent for patient to contact the office to schedule an appointment; see notes below.

## 2022-08-24 DIAGNOSIS — C50.411 MALIGNANT NEOPLASM OF UPPER-OUTER QUADRANT OF RIGHT BREAST IN FEMALE, ESTROGEN RECEPTOR POSITIVE (HCC): Primary | ICD-10-CM

## 2022-08-24 DIAGNOSIS — Z17.0 MALIGNANT NEOPLASM OF UPPER-OUTER QUADRANT OF RIGHT BREAST IN FEMALE, ESTROGEN RECEPTOR POSITIVE (HCC): Primary | ICD-10-CM

## 2022-09-09 ENCOUNTER — TELEPHONE (OUTPATIENT)
Dept: HEMATOLOGY/ONCOLOGY | Facility: HOSPITAL | Age: 57
End: 2022-09-09

## 2022-09-09 NOTE — TELEPHONE ENCOUNTER
Faxed signed order by dr. Yahaira Capone to [facility] for compression arm sleeves and gauntlets.

## 2022-11-16 ENCOUNTER — OFFICE VISIT (OUTPATIENT)
Dept: INTERNAL MEDICINE CLINIC | Facility: CLINIC | Age: 57
End: 2022-11-16
Payer: COMMERCIAL

## 2022-11-16 VITALS
SYSTOLIC BLOOD PRESSURE: 148 MMHG | HEART RATE: 75 BPM | HEIGHT: 61 IN | BODY MASS INDEX: 38.71 KG/M2 | WEIGHT: 205 LBS | DIASTOLIC BLOOD PRESSURE: 82 MMHG

## 2022-11-16 DIAGNOSIS — G47.33 OBSTRUCTIVE SLEEP APNEA: ICD-10-CM

## 2022-11-16 DIAGNOSIS — G47.33 OSA ON CPAP: ICD-10-CM

## 2022-11-16 DIAGNOSIS — Z85.3 HISTORY OF BREAST CANCER: ICD-10-CM

## 2022-11-16 DIAGNOSIS — Z00.00 PHYSICAL EXAM: Primary | ICD-10-CM

## 2022-11-16 DIAGNOSIS — E03.9 HYPOTHYROIDISM, UNSPECIFIED TYPE: ICD-10-CM

## 2022-11-16 DIAGNOSIS — Z99.89 OSA ON CPAP: ICD-10-CM

## 2022-11-16 DIAGNOSIS — D22.9 NUMEROUS MOLES: ICD-10-CM

## 2022-11-16 DIAGNOSIS — Z78.0 POSTMENOPAUSAL: ICD-10-CM

## 2022-11-16 PROCEDURE — 99396 PREV VISIT EST AGE 40-64: CPT | Performed by: INTERNAL MEDICINE

## 2022-11-16 PROCEDURE — 3079F DIAST BP 80-89 MM HG: CPT | Performed by: INTERNAL MEDICINE

## 2022-11-16 PROCEDURE — 3008F BODY MASS INDEX DOCD: CPT | Performed by: INTERNAL MEDICINE

## 2022-11-16 PROCEDURE — 3077F SYST BP >= 140 MM HG: CPT | Performed by: INTERNAL MEDICINE

## 2022-11-18 ENCOUNTER — LAB ENCOUNTER (OUTPATIENT)
Dept: LAB | Facility: HOSPITAL | Age: 57
End: 2022-11-18
Attending: INTERNAL MEDICINE
Payer: COMMERCIAL

## 2022-11-18 DIAGNOSIS — Z00.00 PHYSICAL EXAM: ICD-10-CM

## 2022-11-18 DIAGNOSIS — E03.9 ACQUIRED HYPOTHYROIDISM: Primary | ICD-10-CM

## 2022-11-18 LAB
ALBUMIN SERPL-MCNC: 3.8 G/DL (ref 3.4–5)
ALBUMIN/GLOB SERPL: 1 {RATIO} (ref 1–2)
ALP LIVER SERPL-CCNC: 121 U/L
ALT SERPL-CCNC: 27 U/L
ANION GAP SERPL CALC-SCNC: 5 MMOL/L (ref 0–18)
AST SERPL-CCNC: 14 U/L (ref 15–37)
BASOPHILS # BLD AUTO: 0.05 X10(3) UL (ref 0–0.2)
BASOPHILS NFR BLD AUTO: 0.9 %
BILIRUB SERPL-MCNC: 0.4 MG/DL (ref 0.1–2)
BILIRUB UR QL: NEGATIVE
BUN BLD-MCNC: 22 MG/DL (ref 7–18)
BUN/CREAT SERPL: 30.1 (ref 10–20)
CALCIUM BLD-MCNC: 9.1 MG/DL (ref 8.5–10.1)
CHLORIDE SERPL-SCNC: 106 MMOL/L (ref 98–112)
CHOLEST SERPL-MCNC: 202 MG/DL (ref ?–200)
CLARITY UR: CLEAR
CO2 SERPL-SCNC: 30 MMOL/L (ref 21–32)
COLOR UR: YELLOW
CREAT BLD-MCNC: 0.73 MG/DL
DEPRECATED RDW RBC AUTO: 42.1 FL (ref 35.1–46.3)
EOSINOPHIL # BLD AUTO: 0.13 X10(3) UL (ref 0–0.7)
EOSINOPHIL NFR BLD AUTO: 2.4 %
ERYTHROCYTE [DISTWIDTH] IN BLOOD BY AUTOMATED COUNT: 13.1 % (ref 11–15)
EST. AVERAGE GLUCOSE BLD GHB EST-MCNC: 120 MG/DL (ref 68–126)
FASTING PATIENT LIPID ANSWER: YES
FASTING STATUS PATIENT QL REPORTED: YES
GFR SERPLBLD BASED ON 1.73 SQ M-ARVRAT: 95 ML/MIN/1.73M2 (ref 60–?)
GLOBULIN PLAS-MCNC: 4 G/DL (ref 2.8–4.4)
GLUCOSE BLD-MCNC: 102 MG/DL (ref 70–99)
GLUCOSE UR-MCNC: NEGATIVE MG/DL
HBA1C MFR BLD: 5.8 % (ref ?–5.7)
HCT VFR BLD AUTO: 40 %
HDLC SERPL-MCNC: 65 MG/DL (ref 40–59)
HGB BLD-MCNC: 13.4 G/DL
IMM GRANULOCYTES # BLD AUTO: 0.02 X10(3) UL (ref 0–1)
IMM GRANULOCYTES NFR BLD: 0.4 %
KETONES UR-MCNC: NEGATIVE MG/DL
LDLC SERPL CALC-MCNC: 123 MG/DL (ref ?–100)
LEUKOCYTE ESTERASE UR QL STRIP.AUTO: NEGATIVE
LYMPHOCYTES # BLD AUTO: 1.54 X10(3) UL (ref 1–4)
LYMPHOCYTES NFR BLD AUTO: 28.6 %
MCH RBC QN AUTO: 29.3 PG (ref 26–34)
MCHC RBC AUTO-ENTMCNC: 33.5 G/DL (ref 31–37)
MCV RBC AUTO: 87.5 FL
MONOCYTES # BLD AUTO: 0.52 X10(3) UL (ref 0.1–1)
MONOCYTES NFR BLD AUTO: 9.6 %
NEUTROPHILS # BLD AUTO: 3.13 X10 (3) UL (ref 1.5–7.7)
NEUTROPHILS # BLD AUTO: 3.13 X10(3) UL (ref 1.5–7.7)
NEUTROPHILS NFR BLD AUTO: 58.1 %
NITRITE UR QL STRIP.AUTO: NEGATIVE
NONHDLC SERPL-MCNC: 137 MG/DL (ref ?–130)
OSMOLALITY SERPL CALC.SUM OF ELEC: 296 MOSM/KG (ref 275–295)
PH UR: 6 [PH] (ref 5–8)
PLATELET # BLD AUTO: 259 10(3)UL (ref 150–450)
POTASSIUM SERPL-SCNC: 4.1 MMOL/L (ref 3.5–5.1)
PROT SERPL-MCNC: 7.8 G/DL (ref 6.4–8.2)
PROT UR-MCNC: NEGATIVE MG/DL
RBC # BLD AUTO: 4.57 X10(6)UL
SODIUM SERPL-SCNC: 141 MMOL/L (ref 136–145)
SP GR UR STRIP: 1.02 (ref 1–1.03)
T4 FREE SERPL-MCNC: 1.2 NG/DL (ref 0.8–1.7)
TRIGL SERPL-MCNC: 78 MG/DL (ref 30–149)
TSI SER-ACNC: 6.36 MIU/ML (ref 0.36–3.74)
UROBILINOGEN UR STRIP-ACNC: <2
VIT C UR-MCNC: NEGATIVE MG/DL
VLDLC SERPL CALC-MCNC: 14 MG/DL (ref 0–30)
WBC # BLD AUTO: 5.4 X10(3) UL (ref 4–11)

## 2022-11-18 PROCEDURE — 84443 ASSAY THYROID STIM HORMONE: CPT

## 2022-11-18 PROCEDURE — 85025 COMPLETE CBC W/AUTO DIFF WBC: CPT

## 2022-11-18 PROCEDURE — 80053 COMPREHEN METABOLIC PANEL: CPT

## 2022-11-18 PROCEDURE — 81001 URINALYSIS AUTO W/SCOPE: CPT

## 2022-11-18 PROCEDURE — 80061 LIPID PANEL: CPT

## 2022-11-18 PROCEDURE — 84439 ASSAY OF FREE THYROXINE: CPT

## 2022-11-18 PROCEDURE — 83036 HEMOGLOBIN GLYCOSYLATED A1C: CPT

## 2022-11-18 PROCEDURE — 36415 COLL VENOUS BLD VENIPUNCTURE: CPT

## 2022-11-18 RX ORDER — LEVOTHYROXINE SODIUM 0.15 MG/1
150 TABLET ORAL
Qty: 90 TABLET | Refills: 0 | Status: SHIPPED | OUTPATIENT
Start: 2022-11-18

## 2022-12-01 ENCOUNTER — HOSPITAL ENCOUNTER (OUTPATIENT)
Dept: MAMMOGRAPHY | Facility: HOSPITAL | Age: 57
Discharge: HOME | End: 2022-12-01
Attending: INTERNAL MEDICINE
Payer: COMMERCIAL

## 2022-12-01 DIAGNOSIS — Z17.0 MALIGNANT NEOPLASM OF UPPER-OUTER QUADRANT OF RIGHT BREAST IN FEMALE, ESTROGEN RECEPTOR POSITIVE (HCC): ICD-10-CM

## 2022-12-01 DIAGNOSIS — C50.411 MALIGNANT NEOPLASM OF UPPER-OUTER QUADRANT OF RIGHT BREAST IN FEMALE, ESTROGEN RECEPTOR POSITIVE (HCC): ICD-10-CM

## 2022-12-01 PROCEDURE — 77065 DX MAMMO INCL CAD UNI: CPT | Performed by: INTERNAL MEDICINE

## 2022-12-01 PROCEDURE — 77061 BREAST TOMOSYNTHESIS UNI: CPT | Performed by: INTERNAL MEDICINE

## 2022-12-20 ENCOUNTER — OFFICE VISIT (OUTPATIENT)
Dept: HEMATOLOGY/ONCOLOGY | Facility: HOSPITAL | Age: 57
End: 2022-12-20
Attending: INTERNAL MEDICINE
Payer: COMMERCIAL

## 2022-12-20 VITALS
TEMPERATURE: 98 F | SYSTOLIC BLOOD PRESSURE: 127 MMHG | HEIGHT: 61 IN | BODY MASS INDEX: 38.51 KG/M2 | HEART RATE: 79 BPM | OXYGEN SATURATION: 98 % | WEIGHT: 204 LBS | RESPIRATION RATE: 16 BRPM | DIASTOLIC BLOOD PRESSURE: 65 MMHG

## 2022-12-20 DIAGNOSIS — Z85.3 ENCOUNTER FOR FOLLOW-UP SURVEILLANCE OF BREAST CANCER: ICD-10-CM

## 2022-12-20 DIAGNOSIS — Z08 ENCOUNTER FOR FOLLOW-UP SURVEILLANCE OF BREAST CANCER: ICD-10-CM

## 2022-12-20 DIAGNOSIS — Z79.811 ENCOUNTER FOR MONITORING AROMATASE INHIBITOR THERAPY: ICD-10-CM

## 2022-12-20 DIAGNOSIS — Z17.0 MALIGNANT NEOPLASM OF UPPER-OUTER QUADRANT OF RIGHT BREAST IN FEMALE, ESTROGEN RECEPTOR POSITIVE (HCC): Primary | ICD-10-CM

## 2022-12-20 DIAGNOSIS — C50.411 MALIGNANT NEOPLASM OF UPPER-OUTER QUADRANT OF RIGHT BREAST IN FEMALE, ESTROGEN RECEPTOR POSITIVE (HCC): Primary | ICD-10-CM

## 2022-12-20 DIAGNOSIS — Z51.81 ENCOUNTER FOR MONITORING AROMATASE INHIBITOR THERAPY: ICD-10-CM

## 2022-12-20 PROCEDURE — 99214 OFFICE O/P EST MOD 30 MIN: CPT | Performed by: INTERNAL MEDICINE

## 2023-01-28 ENCOUNTER — LAB ENCOUNTER (OUTPATIENT)
Dept: LAB | Facility: HOSPITAL | Age: 58
End: 2023-01-28
Attending: INTERNAL MEDICINE
Payer: COMMERCIAL

## 2023-01-28 DIAGNOSIS — E03.9 ACQUIRED HYPOTHYROIDISM: ICD-10-CM

## 2023-01-28 LAB
T4 FREE SERPL-MCNC: 1.5 NG/DL (ref 0.8–1.7)
TSI SER-ACNC: 0.49 MIU/ML (ref 0.36–3.74)

## 2023-01-28 PROCEDURE — 84443 ASSAY THYROID STIM HORMONE: CPT

## 2023-01-28 PROCEDURE — 84439 ASSAY OF FREE THYROXINE: CPT

## 2023-01-28 PROCEDURE — 36415 COLL VENOUS BLD VENIPUNCTURE: CPT

## 2023-06-06 ENCOUNTER — HOSPITAL ENCOUNTER (OUTPATIENT)
Dept: MAMMOGRAPHY | Facility: HOSPITAL | Age: 58
Discharge: HOME | End: 2023-06-06
Attending: INTERNAL MEDICINE
Payer: COMMERCIAL

## 2023-06-06 DIAGNOSIS — C50.411 MALIGNANT NEOPLASM OF UPPER-OUTER QUADRANT OF RIGHT BREAST IN FEMALE, ESTROGEN RECEPTOR POSITIVE (HCC): ICD-10-CM

## 2023-06-06 DIAGNOSIS — Z17.0 MALIGNANT NEOPLASM OF UPPER-OUTER QUADRANT OF RIGHT BREAST IN FEMALE, ESTROGEN RECEPTOR POSITIVE (HCC): ICD-10-CM

## 2023-06-06 PROCEDURE — 77066 DX MAMMO INCL CAD BI: CPT | Performed by: INTERNAL MEDICINE

## 2023-06-06 PROCEDURE — 77062 BREAST TOMOSYNTHESIS BI: CPT | Performed by: INTERNAL MEDICINE

## 2023-06-21 ENCOUNTER — OFFICE VISIT (OUTPATIENT)
Dept: HEMATOLOGY/ONCOLOGY | Facility: HOSPITAL | Age: 58
End: 2023-06-21
Attending: INTERNAL MEDICINE
Payer: COMMERCIAL

## 2023-06-21 VITALS
OXYGEN SATURATION: 98 % | SYSTOLIC BLOOD PRESSURE: 131 MMHG | BODY MASS INDEX: 38.71 KG/M2 | HEART RATE: 74 BPM | TEMPERATURE: 98 F | WEIGHT: 205 LBS | RESPIRATION RATE: 16 BRPM | DIASTOLIC BLOOD PRESSURE: 61 MMHG | HEIGHT: 61 IN

## 2023-06-21 DIAGNOSIS — I89.0 LYMPHEDEMA OF UPPER EXTREMITY FOLLOWING LYMPHADENECTOMY: ICD-10-CM

## 2023-06-21 DIAGNOSIS — Z17.0 MALIGNANT NEOPLASM OF UPPER-OUTER QUADRANT OF RIGHT BREAST IN FEMALE, ESTROGEN RECEPTOR POSITIVE (HCC): Primary | ICD-10-CM

## 2023-06-21 DIAGNOSIS — Z79.811 ENCOUNTER FOR MONITORING AROMATASE INHIBITOR THERAPY: ICD-10-CM

## 2023-06-21 DIAGNOSIS — Z78.0 POST-MENOPAUSAL: ICD-10-CM

## 2023-06-21 DIAGNOSIS — E89.89 LYMPHEDEMA OF UPPER EXTREMITY FOLLOWING LYMPHADENECTOMY: ICD-10-CM

## 2023-06-21 DIAGNOSIS — C50.411 MALIGNANT NEOPLASM OF UPPER-OUTER QUADRANT OF RIGHT BREAST IN FEMALE, ESTROGEN RECEPTOR POSITIVE (HCC): Primary | ICD-10-CM

## 2023-06-21 DIAGNOSIS — Z51.81 ENCOUNTER FOR MONITORING AROMATASE INHIBITOR THERAPY: ICD-10-CM

## 2023-06-21 DIAGNOSIS — Z85.3 ENCOUNTER FOR FOLLOW-UP SURVEILLANCE OF BREAST CANCER: ICD-10-CM

## 2023-06-21 DIAGNOSIS — Z08 ENCOUNTER FOR FOLLOW-UP SURVEILLANCE OF BREAST CANCER: ICD-10-CM

## 2023-06-21 PROCEDURE — 99214 OFFICE O/P EST MOD 30 MIN: CPT | Performed by: INTERNAL MEDICINE

## 2023-06-21 RX ORDER — LETROZOLE 2.5 MG/1
2.5 TABLET, FILM COATED ORAL DAILY
Qty: 90 TABLET | Refills: 3 | Status: SHIPPED | OUTPATIENT
Start: 2023-06-21

## 2023-07-06 ENCOUNTER — OFFICE VISIT (OUTPATIENT)
Dept: FAMILY MEDICINE CLINIC | Facility: CLINIC | Age: 58
End: 2023-07-06
Payer: COMMERCIAL

## 2023-07-06 VITALS
RESPIRATION RATE: 18 BRPM | SYSTOLIC BLOOD PRESSURE: 156 MMHG | OXYGEN SATURATION: 95 % | BODY MASS INDEX: 38.71 KG/M2 | HEIGHT: 61 IN | DIASTOLIC BLOOD PRESSURE: 75 MMHG | TEMPERATURE: 98 F | HEART RATE: 79 BPM | WEIGHT: 205 LBS

## 2023-07-06 DIAGNOSIS — L60.1 SEPARATION OF NAIL PLATE: ICD-10-CM

## 2023-07-06 DIAGNOSIS — S90.212A SUBUNGUAL HEMATOMA OF GREAT TOE OF LEFT FOOT, INITIAL ENCOUNTER: Primary | ICD-10-CM

## 2023-07-06 PROCEDURE — 3008F BODY MASS INDEX DOCD: CPT | Performed by: NURSE PRACTITIONER

## 2023-07-06 PROCEDURE — 99212 OFFICE O/P EST SF 10 MIN: CPT | Performed by: NURSE PRACTITIONER

## 2023-07-06 PROCEDURE — 3078F DIAST BP <80 MM HG: CPT | Performed by: NURSE PRACTITIONER

## 2023-07-06 PROCEDURE — 3077F SYST BP >= 140 MM HG: CPT | Performed by: NURSE PRACTITIONER

## 2023-07-06 NOTE — PATIENT INSTRUCTIONS
Soak toe in warm water  May apply topical antibiotic ointment if you notice any redness, swelling or drainage  Call and schedule appointment with podiatry (call PCP office for referral)    Podiatrists through [facility]:  [phone]  Dr. Asuncion Garcias

## 2023-07-07 ENCOUNTER — PATIENT MESSAGE (OUTPATIENT)
Dept: INTERNAL MEDICINE CLINIC | Facility: CLINIC | Age: 58
End: 2023-07-07

## 2023-07-07 DIAGNOSIS — L60.0 INGROWN TOENAIL OF LEFT FOOT: Primary | ICD-10-CM

## 2023-07-09 NOTE — TELEPHONE ENCOUNTER
Dr. Dora Mckinney, please see pended referral for podiatry. From: Eliseo Jennings  To: Noe Peña MD  Sent: 7/7/2023  9:58 PM CDT  Subject: Referral for a podiatrist    Hello,  I went to the walk-in clinic yesterday because the nail on the big toe of my left foot was really bothering me. I had an in-grown toenail taken care of several years ago and it never healed properly. The nurse practitioner wasn't sure what to do and told me to get a referral and see the podiatrist again.   I would appreciate the referral.  Thank you,  Vernon Delgado

## 2023-08-02 RX ORDER — LEVOTHYROXINE SODIUM 0.15 MG/1
150 TABLET ORAL
Qty: 90 TABLET | Refills: 1 | Status: SHIPPED | OUTPATIENT
Start: 2023-08-02

## 2023-08-02 NOTE — TELEPHONE ENCOUNTER
Refill passed per [place] [employer] [place] protocol. Requested Prescriptions   Pending Prescriptions Disp Refills    LEVOTHYROXINE 150 MCG Oral Tab [Pharmacy Med Name: LEVOTHYROXINE 0.150MG (150MCG) TAB] 90 tablet 1     Sig: TAKE 1 TABLET(150 MCG) BY MOUTH BEFORE BREAKFAST       Thyroid Medication Protocol Passed - 8/2/2023 10:09 AM        Passed - TSH in past 12 months        Passed - Last TSH value is normal     Lab Results   Component Value Date    TSH 0.487 01/28/2023                 Passed - In person appointment or virtual visit in the past 12 mos or appointment in next 3 mos     Recent Outpatient Visits              3 weeks ago Subungual hematoma of great toe of left foot, initial encounter    [address] [place],[address], [address], Dorcas Torres APRN    Office Visit    1 month ago Malignant neoplasm of upper-outer quadrant of right breast in female, estrogen receptor positive [facility])    [facility] Hematology Oncology Monty Gómez MD    Office Visit    7 months ago Malignant neoplasm of upper-outer quadrant of right breast in female, estrogen receptor positive [facility])    [facility] Hematology Oncology Monty Gómez MD    Office Visit    8 months ago Physical exam    Adrian Grayson MD    Office Visit    1 year ago Malignant neoplasm of upper-outer quadrant of right breast in female, estrogen receptor positive [facility])    [facility] Hematology Oncology Monty Gómez MD    Office Visit          Future Appointments         Provider Department Appt Notes    [facility] [address] [place] [address],[address], [place] Toenail on big toe on left foot. In 1 month ADO SUSANA RM1; ADO DEXA [address] Comparison to the last scan.                   Future Appointments         Provider Department Appt Notes    [facility], [address], [place] Toenail on big toe on left foot. In 1 month ADO SUSANA RM1; ADO DEXA [address] Comparison to the last scan.           Recent Outpatient Visits              3 weeks ago Subungual hematoma of great toe of left foot, initial encounter    [address], Tasia Nelson, Inés Meek, APRN    Office Visit    1 month ago Malignant neoplasm of upper-outer quadrant of right breast in female, estrogen receptor positive [facility])    [facility] Hematology Oncology Robert Robert MD    Office Visit    7 months ago Malignant neoplasm of upper-outer quadrant of right breast in female, estrogen receptor positive [facility])    [facility] Hematology Oncology Robert Robert MD    Office Visit    8 months ago Physical exam    Martha Nazario MD    Office Visit    1 year ago Malignant neoplasm of upper-outer quadrant of right breast in female, estrogen receptor positive [facility])    [facility] Hematology Oncology Robert Robert MD    Office Visit

## 2023-09-22 ENCOUNTER — HOSPITAL ENCOUNTER (OUTPATIENT)
Dept: BONE DENSITY | Age: 58
Discharge: HOME | End: 2023-09-22
Attending: INTERNAL MEDICINE
Payer: COMMERCIAL

## 2023-09-22 DIAGNOSIS — Z78.0 POST-MENOPAUSAL: ICD-10-CM

## 2023-09-22 PROCEDURE — 77080 DXA BONE DENSITY AXIAL: CPT | Performed by: INTERNAL MEDICINE

## 2023-11-03 DIAGNOSIS — Z17.0 MALIGNANT NEOPLASM OF UPPER-OUTER QUADRANT OF RIGHT BREAST IN FEMALE, ESTROGEN RECEPTOR POSITIVE: Primary | ICD-10-CM

## 2023-11-03 DIAGNOSIS — C50.411 MALIGNANT NEOPLASM OF UPPER-OUTER QUADRANT OF RIGHT BREAST IN FEMALE, ESTROGEN RECEPTOR POSITIVE: Primary | ICD-10-CM

## 2023-11-29 ENCOUNTER — HOSPITAL ENCOUNTER (OUTPATIENT)
Dept: MAMMOGRAPHY | Facility: HOSPITAL | Age: 58
Discharge: HOME | End: 2023-11-29
Attending: INTERNAL MEDICINE
Payer: COMMERCIAL

## 2023-11-29 DIAGNOSIS — C50.411 MALIGNANT NEOPLASM OF UPPER-OUTER QUADRANT OF RIGHT BREAST IN FEMALE, ESTROGEN RECEPTOR POSITIVE: ICD-10-CM

## 2023-11-29 DIAGNOSIS — Z17.0 MALIGNANT NEOPLASM OF UPPER-OUTER QUADRANT OF RIGHT BREAST IN FEMALE, ESTROGEN RECEPTOR POSITIVE: ICD-10-CM

## 2023-11-29 PROCEDURE — 77061 BREAST TOMOSYNTHESIS UNI: CPT | Performed by: INTERNAL MEDICINE

## 2023-11-29 PROCEDURE — 77065 DX MAMMO INCL CAD UNI: CPT | Performed by: INTERNAL MEDICINE

## 2023-12-15 ENCOUNTER — OFFICE VISIT (OUTPATIENT)
Dept: HEMATOLOGY/ONCOLOGY | Facility: HOSPITAL | Age: 58
End: 2023-12-15
Attending: INTERNAL MEDICINE
Payer: COMMERCIAL

## 2023-12-15 VITALS
HEART RATE: 64 BPM | WEIGHT: 189.81 LBS | HEIGHT: 61 IN | DIASTOLIC BLOOD PRESSURE: 68 MMHG | SYSTOLIC BLOOD PRESSURE: 130 MMHG | TEMPERATURE: 98 F | OXYGEN SATURATION: 98 % | BODY MASS INDEX: 35.84 KG/M2 | RESPIRATION RATE: 16 BRPM

## 2023-12-15 DIAGNOSIS — I89.0 LYMPHEDEMA OF UPPER EXTREMITY FOLLOWING LYMPHADENECTOMY: ICD-10-CM

## 2023-12-15 DIAGNOSIS — Z79.811 ENCOUNTER FOR MONITORING AROMATASE INHIBITOR THERAPY: ICD-10-CM

## 2023-12-15 DIAGNOSIS — Z78.0 POST-MENOPAUSAL: ICD-10-CM

## 2023-12-15 DIAGNOSIS — Z17.0 MALIGNANT NEOPLASM OF UPPER-OUTER QUADRANT OF RIGHT BREAST IN FEMALE, ESTROGEN RECEPTOR POSITIVE  (HCC): Primary | ICD-10-CM

## 2023-12-15 DIAGNOSIS — Z08 ENCOUNTER FOR FOLLOW-UP SURVEILLANCE OF BREAST CANCER: ICD-10-CM

## 2023-12-15 DIAGNOSIS — Z51.81 ENCOUNTER FOR MONITORING AROMATASE INHIBITOR THERAPY: ICD-10-CM

## 2023-12-15 DIAGNOSIS — Z85.3 ENCOUNTER FOR FOLLOW-UP SURVEILLANCE OF BREAST CANCER: ICD-10-CM

## 2023-12-15 DIAGNOSIS — E89.89 LYMPHEDEMA OF UPPER EXTREMITY FOLLOWING LYMPHADENECTOMY: ICD-10-CM

## 2023-12-15 DIAGNOSIS — C50.411 MALIGNANT NEOPLASM OF UPPER-OUTER QUADRANT OF RIGHT BREAST IN FEMALE, ESTROGEN RECEPTOR POSITIVE  (HCC): Primary | ICD-10-CM

## 2023-12-15 PROCEDURE — 99214 OFFICE O/P EST MOD 30 MIN: CPT | Performed by: INTERNAL MEDICINE

## 2023-12-21 ENCOUNTER — APPOINTMENT (OUTPATIENT)
Dept: HEMATOLOGY/ONCOLOGY | Facility: HOSPITAL | Age: 58
End: 2023-12-21
Attending: INTERNAL MEDICINE
Payer: COMMERCIAL

## 2024-01-11 RX ORDER — LEVOTHYROXINE SODIUM 0.15 MG/1
150 TABLET ORAL
Qty: 30 TABLET | Refills: 0 | Status: SHIPPED | OUTPATIENT
Start: 2024-01-11 | End: 2024-02-12

## 2024-01-12 NOTE — TELEPHONE ENCOUNTER
DR Baldwin=please disregard .  LAST VISIT 11/16/2022;    Future Appointments   Date Time Provider Department Center   6/18/2024  2:30 PM Zoë Alejandra MD [facility] HEM ONC [facility] message sent with scheduling instruction.   CSS=please assists .    Please review; protocol failed/no protocol.     Requested Prescriptions   Pending Prescriptions Disp Refills    LEVOTHYROXINE 150 MCG Oral Tab [Pharmacy Med Name: LEVOTHYROXINE 0.150MG (150MCG) TAB] 90 tablet 1     Sig: TAKE 1 TABLET(150 MCG) BY MOUTH BEFORE BREAKFAST       Thyroid Medication Protocol Failed - 1/11/2024  5:44 AM        Failed - In person appointment or virtual visit in the past 12 mos or appointment in next 3 mos     Recent Outpatient Visits              3 weeks ago Malignant neoplasm of upper-outer quadrant of right breast in female, estrogen receptor positive  (HCC)    [facility] Hematology Oncology Zoë Alejandra MD    Office Visit    6 months ago Subungual hematoma of great toe of left foot, initial encounter    [facility], Walk-In Clinic, [facility] Kimmy Nelson APRN    Office Visit    6 months ago Malignant neoplasm of upper-outer quadrant of right breast in female, estrogen receptor positive (HCC)    [facility] Hematology Oncology Zoë Alejandra MD    Office Visit    1 year ago Malignant neoplasm of upper-outer quadrant of right breast in female, estrogen receptor positive (HCC)    [facility] Hematology Oncology Zoë Alejandra MD    Office Visit    1 year ago Physical exam    [facility] Gustavo Baldwin MD    Office Visit          Future Appointments         Provider Department Appt Notes    In 5 months Zoë Alejandra MD [facility] Hematology Oncology FOLLOW UP VISIT.CL  6M               Passed - TSH in past 12 months        Passed - Last TSH value is normal     Lab Results   Component Value Date    TSH 0.487 01/28/2023                        Recent Outpatient Visits              3 weeks ago Malignant neoplasm of upper-outer quadrant of right breast in female, estrogen receptor positive  (HCC)    [facility] Hematology Oncology Zoë Alejandra MD    Office Visit    6 months ago Subungual hematoma of great toe of left foot, initial encounter    [facility]    Office Visit    6 months ago Malignant neoplasm of upper-outer quadrant of right breast in female, estrogen receptor positive (HCC)    [facility] Hematology Oncology Zoë Alejandra MD    Office Visit    1 year ago Malignant neoplasm of upper-outer quadrant of right breast in female, estrogen receptor positive (HCC)    [facility] Hematology Oncology Zoë Alejandra MD    Office Visit    1 year ago Physical exam    [facility] Gustavo Baldwin MD    Office Visit             Future Appointments         Provider Department Appt Notes    In 5 months Zoë Alejandra MD [facility] Hematology Oncology FOLLOW UP VISIT.CL  6M

## 2024-01-26 ENCOUNTER — PATIENT MESSAGE (OUTPATIENT)
Dept: INTERNAL MEDICINE CLINIC | Facility: CLINIC | Age: 59
End: 2024-01-26

## 2024-02-12 RX ORDER — LEVOTHYROXINE SODIUM 0.15 MG/1
150 TABLET ORAL
Qty: 30 TABLET | Refills: 0 | Status: SHIPPED | OUTPATIENT
Start: 2024-02-12

## 2024-02-12 NOTE — TELEPHONE ENCOUNTER
Please review; protocol failed/No Protocol    LOV: 11/16/2022    No Active/Future labs pended     Future Appointments   Date Time Provider Department Center   3/18/2024  2:20 PM Gustavo Baldwin MD [facility]       Requested Prescriptions   Pending Prescriptions Disp Refills    LEVOTHYROXINE 150 MCG Oral Tab [Pharmacy Med Name: LEVOTHYROXINE 0.150MG (150MCG) TAB] 30 tablet 0     Sig: TAKE 1 TABLET(150 MCG) BY MOUTH BEFORE BREAKFAST       Thyroid Medication Protocol Failed - 2/10/2024  5:44 AM        Failed - TSH in past 12 months        Passed - Last TSH value is normal     Lab Results   Component Value Date    TSH 0.487 01/28/2023                 Passed - In person appointment or virtual visit in the past 12 mos or appointment in next 3 mos     Recent Outpatient Visits              1 month ago Malignant neoplasm of upper-outer quadrant of right breast in female, estrogen receptor positive  (HCC)    [facility] Hematology Oncology Zoë Alejandra MD    Office Visit    7 months ago Subungual hematoma of great toe of left foot, initial encounter    [facility], Walk-In Clinic, [facility] Kimmy Nelson, APRN    Office Visit    7 months ago Malignant neoplasm of upper-outer quadrant of right breast in female, estrogen receptor positive (HCC)    [facility] Hematology Oncology Zoë Alejandra MD    Office Visit    1 year ago Malignant neoplasm of upper-outer quadrant of right breast in female, estrogen receptor positive (HCC)    [facility] Hematology Oncology Zoë Alejandra MD    Office Visit    1 year ago Physical exam    [facility] Gustavo Baldwin MD    Office Visit          Future Appointments         Provider Department Appt Notes    In 1 month Gustavo Baldwin MD [facility] Thyroid check    In 4 months Zoë Alejandra MD [facility] Hematology Oncology FOLLOW UP  VISIT.CL  6M                  Future Appointments         Provider Department Appt Notes    In 1 month Gustavo Baldwin MD [facility] Thyroid check    In 4 months Zoë Alejandra MD [facility] Hematology Oncology FOLLOW UP VISIT.CL  6M          Recent Outpatient Visits              1 month ago Malignant neoplasm of upper-outer quadrant of right breast in female, estrogen receptor positive  (HCC)    [facility] Hematology Oncology Zoë Alejandra MD    Office Visit    7 months ago Subungual hematoma of great toe of left foot, initial encounter    [facility], Walk-In Clinic, [facility] Kimmy Nelson, OLY    Office Visit    7 months ago Malignant neoplasm of upper-outer quadrant of right breast in female, estrogen receptor positive (HCC)    [facility] Hematology Oncology Zoë Alejandra MD    Office Visit    1 year ago Malignant neoplasm of upper-outer quadrant of right breast in female, estrogen receptor positive (HCC)    [facility] Hematology Oncology Zoë Alejandra MD    Office Visit    1 year ago Physical exam    [facility] Gustavo Baldwin MD    Office Visit

## 2024-03-12 RX ORDER — LEVOTHYROXINE SODIUM 0.15 MG/1
150 TABLET ORAL
Qty: 30 TABLET | Refills: 0 | Status: SHIPPED | OUTPATIENT
Start: 2024-03-12

## 2024-03-12 NOTE — TELEPHONE ENCOUNTER
Please review; protocol failed/No Protocol    LOV: 11/16/2022  Future Appointments   Date Time Provider Department Center   3/18/2024  2:20 PM Gustavo Baldwin MD [facility]     No Active/Future labs pended     Requested Prescriptions   Pending Prescriptions Disp Refills    LEVOTHYROXINE 150 MCG Oral Tab [Pharmacy Med Name: LEVOTHYROXINE 0.150MG (150MCG) TAB] 30 tablet 0     Sig: TAKE 1 TABLET(150 MCG) BY MOUTH BEFORE BREAKFAST       Thyroid Medication Protocol Failed - 3/11/2024  5:44 AM        Failed - TSH in past 12 months        Passed - Last TSH value is normal     Lab Results   Component Value Date    TSH 0.487 01/28/2023                 Passed - In person appointment or virtual visit in the past 12 mos or appointment in next 3 mos     Recent Outpatient Visits              2 months ago Malignant neoplasm of upper-outer quadrant of right breast in female, estrogen receptor positive (HCC)    [facility] Hematology Oncology Zoë Alejandra MD    Office Visit    8 months ago Subungual hematoma of great toe of left foot, initial encounter    [facility], Walk-In Clinic, [facility] Kimmy Nelson, APRN    Office Visit    8 months ago Malignant neoplasm of upper-outer quadrant of right breast in female, estrogen receptor positive (HCC)    [facility] Hematology Oncology Zoë Alejandra MD    Office Visit    1 year ago Malignant neoplasm of upper-outer quadrant of right breast in female, estrogen receptor positive (HCC)    [facility] Hematology Oncology Zoë Alejandra MD    Office Visit    1 year ago Physical exam    [facility] Gustavo Baldwin MD    Office Visit          Future Appointments         Provider Department Appt Notes    In 6 days [facility]    In 3 months Zoë Alejandra MD [facility] Hematology Oncology FOLLOW UP VISIT.CL  6M                   Future Appointments         Provider Department Appt Notes    In 6 days Gustavo Baldwin MD [facility] Thyroid check    In 3 months Zoë Alejandra MD [facility] Hematology Oncology FOLLOW UP VISIT.CL  6M          Recent Outpatient Visits              2 months ago Malignant neoplasm of upper-outer quadrant of right breast in female, estrogen receptor positive (HCC)    [facility] Hematology Oncology Zoë Alejandra MD    Office Visit    8 months ago Subungual hematoma of great toe of left foot, initial encounter    [facility], Walk-In Clinic, [facility] Kimmy Nelson, APRN    Office Visit    8 months ago Malignant neoplasm of upper-outer quadrant of right breast in female, estrogen receptor positive (HCC)    [facility] Hematology Oncology Zoë Alejandra MD    Office Visit    1 year ago Malignant neoplasm of upper-outer quadrant of right breast in female, estrogen receptor positive (HCC)    [facility] Hematology Oncology Zoë Alejandra MD    Office Visit    1 year ago Physical exam    [facility] Gustavo Baldwin MD    Office Visit

## 2024-03-18 ENCOUNTER — OFFICE VISIT (OUTPATIENT)
Dept: INTERNAL MEDICINE CLINIC | Facility: CLINIC | Age: 59
End: 2024-03-18

## 2024-03-18 VITALS
SYSTOLIC BLOOD PRESSURE: 139 MMHG | WEIGHT: 196 LBS | HEIGHT: 61 IN | HEART RATE: 80 BPM | DIASTOLIC BLOOD PRESSURE: 79 MMHG | BODY MASS INDEX: 37 KG/M2

## 2024-03-18 DIAGNOSIS — C50.411 MALIGNANT NEOPLASM OF UPPER-OUTER QUADRANT OF RIGHT BREAST IN FEMALE, ESTROGEN RECEPTOR POSITIVE (HCC): ICD-10-CM

## 2024-03-18 DIAGNOSIS — Z17.0 MALIGNANT NEOPLASM OF UPPER-OUTER QUADRANT OF RIGHT BREAST IN FEMALE, ESTROGEN RECEPTOR POSITIVE (HCC): ICD-10-CM

## 2024-03-18 DIAGNOSIS — E88.819 INSULIN RESISTANCE: ICD-10-CM

## 2024-03-18 DIAGNOSIS — E78.5 DYSLIPIDEMIA: ICD-10-CM

## 2024-03-18 DIAGNOSIS — Z00.00 PHYSICAL EXAM: Primary | ICD-10-CM

## 2024-03-18 DIAGNOSIS — D22.9 NUMEROUS MOLES: ICD-10-CM

## 2024-03-18 DIAGNOSIS — G47.33 OSA ON CPAP: ICD-10-CM

## 2024-03-18 DIAGNOSIS — Z12.4 SCREENING FOR CERVICAL CANCER: ICD-10-CM

## 2024-03-18 DIAGNOSIS — R06.00 DYSPNEA, UNSPECIFIED TYPE: ICD-10-CM

## 2024-03-18 DIAGNOSIS — E03.9 ACQUIRED HYPOTHYROIDISM: ICD-10-CM

## 2024-03-19 NOTE — PROGRESS NOTES
Subjective:     Patient ID: Tiana Abreu is a 59 year old female.    HPI  Physical exam    Generally healthy  Short of breath with exertion  No chest pain     Colonoscopy July 2025 due    Dr Alas  Last July 2022 One of the polyps removed was an adenoma with high grade dysplasia   History/Other:   Review of Systems   Constitutional:  Negative for activity change, chills, fatigue and fever.   HENT:  Negative for ear discharge, nosebleeds, postnasal drip, rhinorrhea, sinus pressure and sore throat.    Eyes:  Negative for pain, discharge and redness.   Respiratory:  Positive for shortness of breath. Negative for cough, chest tightness and wheezing.    Cardiovascular:  Negative for chest pain, palpitations and leg swelling.   Gastrointestinal:  Negative for abdominal pain, blood in stool, constipation, diarrhea, nausea and vomiting.   Genitourinary:  Negative for difficulty urinating, dysuria, frequency, hematuria and urgency.   Musculoskeletal:  Negative for back pain, gait problem and joint swelling.   Skin:  Negative for rash.   Neurological:  Negative for syncope, weakness, light-headedness and headaches.   Psychiatric/Behavioral:  Negative for dysphoric mood. The patient is not nervous/anxious.      Current Outpatient Medications   Medication Sig Dispense Refill    levothyroxine 150 MCG Oral Tab Take 1 tablet (150 mcg total) by mouth before breakfast. 30 tablet 0    letrozole 2.5 MG Oral Tab Take 1 tablet (2.5 mg total) by mouth daily. 90 tablet 3    Flaxseed, Linseed, (FLAX SEEDS OR) Take by mouth.      acetaminophen 500 MG Oral Tab Take 1 tablet (500 mg total) by mouth every 6 (six) hours as needed for Pain or Fever.      Multiple Vitamins-Minerals (MULTI-VITAMIN/MINERALS) Oral Tab Take 1 tablet by mouth daily.      Ascorbic Acid (VITAMIN C OR) Take by mouth daily.      Cholecalciferol (VITAMIN D-3 OR) Take by mouth daily.       Allergies:No Known Allergies    Past Medical History:   Diagnosis Date     Abnormal uterine bleeding 2017    Last occurence    Amenorrhea     Breast CA (HCC)     age 56    Cancer (HCC)     right breast    Disorder of thyroid     Dyspareunia     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 2022    Hypothyroidism     Morbid obesity with BMI of 40.0-44.9, adult (HCC)     KIRTI on CPAP     Sleep apnea     Visual impairment     glasses      Past Surgical History:   Procedure Laterality Date      ,,,2001    x 4    COLONOSCOPY N/A 2022    Procedure: COLONOSCOPY;  Surgeon: Jeffrey Alas MD;  Location: [facility] ENDOSCOPY    LUMPECTOMY RIGHT Right 2021    RADIATION RIGHT Right 10/2021      Family History   Problem Relation Age of Onset    Diabetes Mother     Glaucoma Mother     Cancer Mother 71        Uteran, lung, brain tumors    Heart Disorder Father         Has had 2 bypass surgeries    Melanoma Father 78    Other (Other) Father         2 Heart by-pass surgeries    Breast Cancer Self 56      Social History:   Social History     Socioeconomic History    Marital status:    Tobacco Use    Smoking status: Former     Packs/day: 0.00     Years: 0.02     Additional pack years: 0.00     Total pack years: 0.00     Types: Cigarettes     Start date: 1979     Quit date: 1979     Years since quittin.3     Passive exposure: Past    Smokeless tobacco: Never    Tobacco comments:     smoked age 14 for one week only   Vaping Use    Vaping Use: Never used   Substance and Sexual Activity    Alcohol use: Yes     Alcohol/week: 0.0 standard drinks of alcohol     Comment: Very infrequently; 1 glass of wine/4 months    Drug use: No   Other Topics Concern    Caffeine Concern Yes     Comment: Coffee 4 cups daily   Social History Narrative    Work in [employer]        Objective:   Physical Exam  Constitutional:       Appearance: She is well-developed. She is not ill-appearing.   HENT:      Right Ear: Ear canal normal.      Left Ear: Ear canal normal.      Mouth/Throat:       Pharynx: Oropharynx is clear.   Eyes:      Extraocular Movements: Extraocular movements intact.      Conjunctiva/sclera: Conjunctivae normal.      Pupils: Pupils are equal, round, and reactive to light.   Cardiovascular:      Rate and Rhythm: Normal rate and regular rhythm.      Heart sounds: Normal heart sounds.   Pulmonary:      Effort: Pulmonary effort is normal.      Breath sounds: Normal breath sounds.   Abdominal:      General: Bowel sounds are normal.      Palpations: Abdomen is soft.   Skin:     General: Skin is warm and dry.   Neurological:      Mental Status: She is alert.   Psychiatric:         Mood and Affect: Mood normal.         Assessment & Plan:   (Z00.00) Physical exam  (primary encounter diagnosis)  Plan: CBC With Differential With Platelet, Comp         Metabolic Panel (14), Lipid Panel, Hemoglobin         A1C, TSH and Free T4, Urinalysis, Routine,         Vitamin D, Folic Acid Serum (Folate), Vitamin         B12, EKG 12 Lead, Vitamin D [E]  Independent with ADL.s  Screening mammogram pap smear and colonoscopy aedivsed    (Z12.4) Screening for cervical cancer  Plan: OBG - INTERNAL       eferral given  (D22.9) Numerous moles  Plan: DERM - INTERNAL        Referral given    (R06.00) Dyspnea, unspecified type  Plan: CARD ECHO 2D DOPPLER (CPT=93306)       Echo ordered    (E03.9) Acquired hypothyroidism  Plan: stabl emonitor    (G47.33) KIRTI on CPAP  Plan: chronic  CPMmm    (C50.411,  Z17.0) Malignant neoplasm of upper-outer quadrant of right breast in female, estrogen receptor positive (HCC)  Plan: ongoing follow up with oncology    (E88.819) Insulin resistance  Plan: chronic   limit carb intake healthy diet  Exercise as tolreated    (E78.5) Dyslipidemia  Plan: Low cholesterol diet advised  Avoid saturated and trans fats     Patient voiced understanding  and agrees with plan        Orders Placed This Encounter   Procedures    CBC With Differential With Platelet    Comp Metabolic Panel (14)    Lipid  Panel    Hemoglobin A1C    TSH and Free T4    Urinalysis, Routine    Vitamin D    Folic Acid Serum (Folate)    Vitamin B12       Meds This Visit:  Requested Prescriptions      No prescriptions requested or ordered in this encounter       Imaging & Referrals:  OBG - INTERNAL  DERM - INTERNAL  EKG 12-LEAD  CARD ECHO 2D DOPPLER (CPT=93306)

## 2024-03-27 ENCOUNTER — EKG ENCOUNTER (OUTPATIENT)
Dept: LAB | Facility: HOSPITAL | Age: 59
End: 2024-03-27
Attending: INTERNAL MEDICINE
Payer: COMMERCIAL

## 2024-03-27 ENCOUNTER — LAB ENCOUNTER (OUTPATIENT)
Dept: LAB | Facility: REFERENCE LAB | Age: 59
End: 2024-03-27
Attending: INTERNAL MEDICINE
Payer: COMMERCIAL

## 2024-03-27 DIAGNOSIS — Z00.00 PHYSICAL EXAM: ICD-10-CM

## 2024-03-27 LAB
ALBUMIN SERPL-MCNC: 4.5 G/DL (ref 3.2–4.8)
ALBUMIN/GLOB SERPL: 1.6 {RATIO} (ref 1–2)
ALP LIVER SERPL-CCNC: 109 U/L
ALT SERPL-CCNC: 28 U/L
ANION GAP SERPL CALC-SCNC: 4 MMOL/L (ref 0–18)
AST SERPL-CCNC: 24 U/L (ref ?–34)
ATRIAL RATE: 72 BPM
BASOPHILS # BLD AUTO: 0.02 X10(3) UL (ref 0–0.2)
BASOPHILS NFR BLD AUTO: 0.4 %
BILIRUB SERPL-MCNC: 0.6 MG/DL (ref 0.2–1.1)
BILIRUB UR QL: NEGATIVE
BUN BLD-MCNC: 23 MG/DL (ref 9–23)
BUN/CREAT SERPL: 33.3 (ref 10–20)
CALCIUM BLD-MCNC: 9.6 MG/DL (ref 8.7–10.4)
CHLORIDE SERPL-SCNC: 108 MMOL/L (ref 98–112)
CHOLEST SERPL-MCNC: 190 MG/DL (ref ?–200)
CLARITY UR: CLEAR
CO2 SERPL-SCNC: 28 MMOL/L (ref 21–32)
COLOR UR: COLORLESS
CREAT BLD-MCNC: 0.69 MG/DL
DEPRECATED RDW RBC AUTO: 41.5 FL (ref 35.1–46.3)
EGFRCR SERPLBLD CKD-EPI 2021: 99 ML/MIN/1.73M2 (ref 60–?)
EOSINOPHIL # BLD AUTO: 0.1 X10(3) UL (ref 0–0.7)
EOSINOPHIL NFR BLD AUTO: 2.1 %
ERYTHROCYTE [DISTWIDTH] IN BLOOD BY AUTOMATED COUNT: 13.2 % (ref 11–15)
EST. AVERAGE GLUCOSE BLD GHB EST-MCNC: 123 MG/DL (ref 68–126)
FASTING PATIENT LIPID ANSWER: YES
FASTING STATUS PATIENT QL REPORTED: YES
FOLATE SERPL-MCNC: >24 NG/ML (ref 5.4–?)
GLOBULIN PLAS-MCNC: 2.9 G/DL (ref 2.8–4.4)
GLUCOSE BLD-MCNC: 93 MG/DL (ref 70–99)
GLUCOSE UR-MCNC: NORMAL MG/DL
HBA1C MFR BLD: 5.9 % (ref ?–5.7)
HCT VFR BLD AUTO: 37 %
HDLC SERPL-MCNC: 72 MG/DL (ref 40–59)
HGB BLD-MCNC: 12.5 G/DL
HGB UR QL STRIP.AUTO: NEGATIVE
IMM GRANULOCYTES # BLD AUTO: 0.01 X10(3) UL (ref 0–1)
IMM GRANULOCYTES NFR BLD: 0.2 %
KETONES UR-MCNC: NEGATIVE MG/DL
LDLC SERPL CALC-MCNC: 107 MG/DL (ref ?–100)
LEUKOCYTE ESTERASE UR QL STRIP.AUTO: NEGATIVE
LYMPHOCYTES # BLD AUTO: 1.44 X10(3) UL (ref 1–4)
LYMPHOCYTES NFR BLD AUTO: 30.2 %
MCH RBC QN AUTO: 29 PG (ref 26–34)
MCHC RBC AUTO-ENTMCNC: 33.8 G/DL (ref 31–37)
MCV RBC AUTO: 85.8 FL
MONOCYTES # BLD AUTO: 0.41 X10(3) UL (ref 0.1–1)
MONOCYTES NFR BLD AUTO: 8.6 %
NEUTROPHILS # BLD AUTO: 2.79 X10 (3) UL (ref 1.5–7.7)
NEUTROPHILS # BLD AUTO: 2.79 X10(3) UL (ref 1.5–7.7)
NEUTROPHILS NFR BLD AUTO: 58.5 %
NITRITE UR QL STRIP.AUTO: NEGATIVE
NONHDLC SERPL-MCNC: 118 MG/DL (ref ?–130)
OSMOLALITY SERPL CALC.SUM OF ELEC: 293 MOSM/KG (ref 275–295)
P AXIS: 66 DEGREES
P-R INTERVAL: 188 MS
PH UR: 6.5 [PH] (ref 5–8)
PLATELET # BLD AUTO: 233 10(3)UL (ref 150–450)
POTASSIUM SERPL-SCNC: 3.7 MMOL/L (ref 3.5–5.1)
PROT SERPL-MCNC: 7.4 G/DL (ref 5.7–8.2)
PROT UR-MCNC: NEGATIVE MG/DL
Q-T INTERVAL: 376 MS
QRS DURATION: 84 MS
QTC CALCULATION (BEZET): 411 MS
R AXIS: 44 DEGREES
RBC # BLD AUTO: 4.31 X10(6)UL
SODIUM SERPL-SCNC: 140 MMOL/L (ref 136–145)
SP GR UR STRIP: 1.01 (ref 1–1.03)
T AXIS: 36 DEGREES
T4 FREE SERPL-MCNC: 1.5 NG/DL (ref 0.8–1.7)
TRIGL SERPL-MCNC: 59 MG/DL (ref 30–149)
TSI SER-ACNC: 0.53 MIU/ML (ref 0.55–4.78)
UROBILINOGEN UR STRIP-ACNC: NORMAL
VENTRICULAR RATE: 72 BPM
VIT B12 SERPL-MCNC: 514 PG/ML (ref 211–911)
VIT D+METAB SERPL-MCNC: 48.4 NG/ML (ref 30–100)
VLDLC SERPL CALC-MCNC: 10 MG/DL (ref 0–30)
WBC # BLD AUTO: 4.8 X10(3) UL (ref 4–11)

## 2024-03-27 PROCEDURE — 82607 VITAMIN B-12: CPT

## 2024-03-27 PROCEDURE — 80053 COMPREHEN METABOLIC PANEL: CPT

## 2024-03-27 PROCEDURE — 84443 ASSAY THYROID STIM HORMONE: CPT

## 2024-03-27 PROCEDURE — 93010 ELECTROCARDIOGRAM REPORT: CPT | Performed by: STUDENT IN AN ORGANIZED HEALTH CARE EDUCATION/TRAINING PROGRAM

## 2024-03-27 PROCEDURE — 84439 ASSAY OF FREE THYROXINE: CPT

## 2024-03-27 PROCEDURE — 82306 VITAMIN D 25 HYDROXY: CPT

## 2024-03-27 PROCEDURE — 93005 ELECTROCARDIOGRAM TRACING: CPT

## 2024-03-27 PROCEDURE — 85025 COMPLETE CBC W/AUTO DIFF WBC: CPT

## 2024-03-27 PROCEDURE — 83036 HEMOGLOBIN GLYCOSYLATED A1C: CPT

## 2024-03-27 PROCEDURE — 82746 ASSAY OF FOLIC ACID SERUM: CPT

## 2024-03-27 PROCEDURE — 81003 URINALYSIS AUTO W/O SCOPE: CPT

## 2024-03-27 PROCEDURE — 80061 LIPID PANEL: CPT

## 2024-03-27 PROCEDURE — 36415 COLL VENOUS BLD VENIPUNCTURE: CPT

## 2024-04-03 DIAGNOSIS — Z13.6 SCREENING FOR CARDIOVASCULAR CONDITION: Primary | ICD-10-CM

## 2024-04-10 NOTE — TELEPHONE ENCOUNTER
Please Review. Protocol Failed; No Protocol     Requested Prescriptions   Pending Prescriptions Disp Refills    LEVOTHYROXINE 150 MCG Oral Tab [Pharmacy Med Name: LEVOTHYROXINE 0.150MG (150MCG) TAB] 30 tablet 0     Sig: TAKE 1 TABLET(150 MCG) BY MOUTH BEFORE BREAKFAST       Thyroid Medication Protocol Failed - 4/10/2024  5:44 AM        Failed - Last TSH value is normal     Lab Results   Component Value Date    TSH 0.527 (L) 03/27/2024                 Passed - TSH in past 12 months        Passed - In person appointment or virtual visit in the past 12 mos or appointment in next 3 mos     Recent Outpatient Visits              3 weeks ago Physical exam    [facility] David Gustavo Baldwin MD    Office Visit    3 months ago Malignant neoplasm of upper-outer quadrant of right breast in female, estrogen receptor positive (HCC)    [facility] Hematology Oncology Zoë Alejandra MD    Office Visit    9 months ago Subungual hematoma of great toe of left foot, initial encounter    [facility], Walk-In Clinic, [facility] Kimmy Nelson APRN    Office Visit    9 months ago Malignant neoplasm of upper-outer quadrant of right breast in female, estrogen receptor positive (HCC)    [facility] Hematology Oncology Zoë Alejandra MD    Office Visit    1 year ago Malignant neoplasm of upper-outer quadrant of right breast in female, estrogen receptor positive (HCC)    [facility] Hematology Oncology Zoë Alejandra MD    Office Visit          Future Appointments         Provider Department Appt Notes    In 1 week [address] Pt is aware of $49 fee due at reg, ag    In 1 month Verenice Jackson MD [facility] - OB/GYN screening for cervical cancer    In 1 month [address] Mammography [facility]     In 2 months Zoë Alejandra MD [facility]  Hematology Oncology FOLLOW UP [email or web address].CL  6M                           Future Appointments         Provider Department Appt Notes    In 1 week [facility] RM1 [facility] CT - [place] Pt is aware of $49 fee due at reg, ag    In 1 month Verenice Jackson MD [facility] - OB/GYN screening for cervical cancer    In 1 month [facility] RM2 [facility] Mammography [facility]     In 2 months Zoë Alejandra MD [facility] Hematology Oncology FOLLOW UP [email or web address].CL  6M          Recent Outpatient Visits              3 weeks ago Physical exam    [facility] Gustavo Baldwin MD    Office Visit    3 months ago Malignant neoplasm of upper-outer quadrant of right breast in female, estrogen receptor positive (HCC)    [facility] Hematology Oncology Zoë Alejandra MD    Office Visit    9 months ago Subungual hematoma of great toe of left foot, initial encounter    [facility], Walk-In Clinic, [facility] Kimmy Nelson APRN    Office Visit    9 months ago Malignant neoplasm of upper-outer quadrant of right breast in female, estrogen receptor positive (HCC)    [facility] Hematology Oncology Zoë Alejandra MD    Office Visit    1 year ago Malignant neoplasm of upper-outer quadrant of right breast in female, estrogen receptor positive (HCC)    [facility] Hematology Oncology Zoë Alejandra MD    Office Visit

## 2024-04-11 RX ORDER — LEVOTHYROXINE SODIUM 0.15 MG/1
150 TABLET ORAL
Qty: 90 TABLET | Refills: 3 | Status: SHIPPED | OUTPATIENT
Start: 2024-04-11

## 2024-04-18 ENCOUNTER — HOSPITAL ENCOUNTER (OUTPATIENT)
Dept: CT IMAGING | Age: 59
Discharge: HOME | End: 2024-04-18
Attending: INTERNAL MEDICINE

## 2024-04-18 DIAGNOSIS — Z13.6 SCREENING FOR CARDIOVASCULAR CONDITION: ICD-10-CM

## 2024-06-05 ENCOUNTER — HOSPITAL ENCOUNTER (OUTPATIENT)
Dept: MAMMOGRAPHY | Facility: HOSPITAL | Age: 59
Discharge: HOME | End: 2024-06-05
Attending: INTERNAL MEDICINE
Payer: COMMERCIAL

## 2024-06-05 DIAGNOSIS — Z17.0 MALIGNANT NEOPLASM OF UPPER-OUTER QUADRANT OF RIGHT BREAST IN FEMALE, ESTROGEN RECEPTOR POSITIVE (HCC): ICD-10-CM

## 2024-06-05 DIAGNOSIS — C50.411 MALIGNANT NEOPLASM OF UPPER-OUTER QUADRANT OF RIGHT BREAST IN FEMALE, ESTROGEN RECEPTOR POSITIVE (HCC): ICD-10-CM

## 2024-06-05 PROCEDURE — 77063 BREAST TOMOSYNTHESIS BI: CPT | Performed by: INTERNAL MEDICINE

## 2024-06-05 PROCEDURE — 77067 SCR MAMMO BI INCL CAD: CPT | Performed by: INTERNAL MEDICINE

## 2024-06-18 ENCOUNTER — OFFICE VISIT (OUTPATIENT)
Dept: HEMATOLOGY/ONCOLOGY | Facility: HOSPITAL | Age: 59
End: 2024-06-18
Attending: INTERNAL MEDICINE

## 2024-06-18 VITALS
OXYGEN SATURATION: 98 % | TEMPERATURE: 98 F | BODY MASS INDEX: 37.23 KG/M2 | DIASTOLIC BLOOD PRESSURE: 54 MMHG | RESPIRATION RATE: 16 BRPM | WEIGHT: 197.19 LBS | SYSTOLIC BLOOD PRESSURE: 129 MMHG | HEART RATE: 68 BPM | HEIGHT: 61 IN

## 2024-06-18 DIAGNOSIS — Z51.81 ENCOUNTER FOR MONITORING AROMATASE INHIBITOR THERAPY: ICD-10-CM

## 2024-06-18 DIAGNOSIS — I89.0 LYMPHEDEMA OF BREAST: ICD-10-CM

## 2024-06-18 DIAGNOSIS — Z17.0 MALIGNANT NEOPLASM OF UPPER-OUTER QUADRANT OF RIGHT BREAST IN FEMALE, ESTROGEN RECEPTOR POSITIVE (HCC): Primary | ICD-10-CM

## 2024-06-18 DIAGNOSIS — Z08 ENCOUNTER FOR FOLLOW-UP SURVEILLANCE OF BREAST CANCER: ICD-10-CM

## 2024-06-18 DIAGNOSIS — C50.411 MALIGNANT NEOPLASM OF UPPER-OUTER QUADRANT OF RIGHT BREAST IN FEMALE, ESTROGEN RECEPTOR POSITIVE (HCC): Primary | ICD-10-CM

## 2024-06-18 DIAGNOSIS — Z85.3 ENCOUNTER FOR FOLLOW-UP SURVEILLANCE OF BREAST CANCER: ICD-10-CM

## 2024-06-18 DIAGNOSIS — L76.82 AXILLARY WEB SYNDROME: ICD-10-CM

## 2024-06-18 DIAGNOSIS — Z79.811 ENCOUNTER FOR MONITORING AROMATASE INHIBITOR THERAPY: ICD-10-CM

## 2024-06-18 DIAGNOSIS — L90.5 AXILLARY WEB SYNDROME: ICD-10-CM

## 2024-06-18 PROCEDURE — 99214 OFFICE O/P EST MOD 30 MIN: CPT | Performed by: INTERNAL MEDICINE

## 2024-06-18 RX ORDER — LETROZOLE 2.5 MG/1
2.5 TABLET, FILM COATED ORAL DAILY
Qty: 90 TABLET | Refills: 3 | Status: SHIPPED | OUTPATIENT
Start: 2024-06-18

## 2024-06-18 NOTE — PROGRESS NOTES
HPI:  Tiana Abreu is a 59 year old female with diagnosis of   Encounter Diagnoses   Name Primary?    Malignant neoplasm of upper-outer quadrant of right breast in female, estrogen receptor positive (HCC) Yes    Encounter for monitoring aromatase inhibitor therapy     Encounter for follow-up surveillance of breast cancer     Lymphedema of breast     Axillary web syndrome        Compliance with SBE: Yes. Changes on SBE: No.      Compliance with letrozole:  compliance all of the time    Side effects from letrozole: Yes hot flashes improved with flax seed oil, No arthralgias or myalgias.    Tight at the R arm.  R fingers stuck at times, state from holding in position for a long time with driving.  Only R hand.  Not stretching.  Does have HEP papers at home.  Has not been doing massages more often.  States arm feels tight but not more swollen.      Working on weight loss and succeeding.     Lots of stressors with family life, moving.      ECOG PS: 0      Review of Systems   Constitutional:  Negative for appetite change, fatigue and unexpected weight change.   Respiratory:  Negative for cough and shortness of breath.    Cardiovascular:  Negative for chest pain.   Gastrointestinal:  Negative for abdominal pain.   Endocrine: Positive for hot flashes.   Musculoskeletal:  Negative for arthralgias (as above.), back pain and neck pain.        No bone pain   Neurological:  Negative for dizziness and headaches.   Hematological:  Negative for adenopathy.   Psychiatric/Behavioral:  Positive for sleep disturbance (KIRTI using CPAP).        ALLERGIES AND MEDICATIONS REVIEWED WITH PATIENT:    Allergies:  No Known Allergies      Current Outpatient Medications:     letrozole 2.5 MG Oral Tab, Take 1 tablet (2.5 mg total) by mouth daily., Disp: 90 tablet, Rfl: 3    levothyroxine 150 MCG Oral Tab, Take 1 tablet (150 mcg total) by mouth before breakfast., Disp: 90 tablet, Rfl: 3    Flaxseed, Linseed, (FLAX SEEDS OR), Take by mouth.,  Disp: , Rfl:     acetaminophen 500 MG Oral Tab, Take 1 tablet (500 mg total) by mouth every 6 (six) hours as needed for Pain or Fever., Disp: , Rfl:     Multiple Vitamins-Minerals (MULTI-VITAMIN/MINERALS) Oral Tab, Take 1 tablet by mouth daily., Disp: , Rfl:     Ascorbic Acid (VITAMIN C OR), Take by mouth daily., Disp: , Rfl:     Cholecalciferol (VITAMIN D-3 OR), Take by mouth daily., Disp: , Rfl:         HISTORY REVIEWED WITH PATIENT:  Past Medical History:    Abnormal uterine bleeding    Last occurence    Amenorrhea    Breast CA (HCC)    age 56    Cancer (HCC)    right breast    Disorder of thyroid    Dyspareunia    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    Hypothyroidism    Morbid obesity with BMI of 40.0-44.9, adult (HCC)    KIRTI on CPAP    Sleep apnea    Visual impairment    glasses      Past Surgical History:   Procedure Laterality Date      ,,,2001    x 4    Colonoscopy N/A 2022    Procedure: COLONOSCOPY;  Surgeon: Jeffrey Alas MD;  Location: [facility] ENDOSCOPY    Lumpectomy right Right 2021    Radiation right Right 10/2021      Family History   Problem Relation Age of Onset    Diabetes Mother     Glaucoma Mother     Cancer Mother 71        Uteran, lung, brain tumors    Heart Disorder Father         Has had 2 bypass surgeries    Melanoma Father 78    Other (Other) Father         2 Heart by-pass surgeries    Breast Cancer Self 56      Social History     Socioeconomic History    Marital status:    Tobacco Use    Smoking status: Former     Current packs/day: 0.00     Types: Cigarettes     Start date: 1979     Quit date: 1979     Years since quittin.5     Passive exposure: Past    Smokeless tobacco: Never    Tobacco comments:     smoked age 14 for one week only   Vaping Use    Vaping status: Never Used   Substance and Sexual Activity    Alcohol use: Yes     Alcohol/week: 0.0 standard drinks of alcohol     Comment: Very infrequently; 1 glass of wine/4 months    Drug  use: No   Other Topics Concern    Caffeine Concern Yes     Comment: Coffee 4 cups daily   Social History Narrative    Work in cafeteria          Exam:  /54 (BP Location: Left arm, Patient Position: Sitting, Cuff Size: large)   Pulse 68   Temp 98.1 °F (36.7 °C) (Oral)   Resp 16   Ht 1.549 m (5' 1\")   Wt 89.4 kg (197 lb 3.2 oz)   LMP 04/22/2017   SpO2 98%   BMI 37.26 kg/m²   Wt Readings from Last 6 Encounters:   06/18/24 89.4 kg (197 lb 3.2 oz)   03/18/24 88.9 kg (196 lb)   12/15/23 86.1 kg (189 lb 12.8 oz)   07/06/23 93 kg (205 lb)   06/21/23 93 kg (205 lb)   12/20/22 92.5 kg (204 lb)     General: Patient is alert, not in acute distress.  HEENT: EOMs intact. PERRL.   Neck: No JVD. No palpable lymphadenopathy. Neck is supple.  Chest: Clear to auscultation.  Breasts: R breast with surgical changes and RT changes, mild lymphedema, no masses.  Cording on the R axilla.  L breast with no masses.   Heart: Regular rate and rhythm.   Abdomen: Soft, non tender with good bowel sounds.  Extremities: No edema.  Neurological: Grossly intact.   Lymphatics: There is no palpable lymphadenopathy throughout in the cervical, supraclavicular, axillary, or inguinal regions.  Psych/Depression: nl      Assessment and Plan:    Tiana Abreu is a 59 year old female being evaluated for   Encounter Diagnoses   Name Primary?    Malignant neoplasm of upper-outer quadrant of right breast in female, estrogen receptor positive (HCC) Yes    Encounter for monitoring aromatase inhibitor therapy     Encounter for follow-up surveillance of breast cancer     Lymphedema of breast     Axillary web syndrome        Cancer Staging  Malignant neoplasm of upper-outer quadrant of right breast in female, estrogen receptor positive (HCC)  Staging form: Breast, AJCC 8th Edition  - Pathologic stage from 8/11/2021: Stage IIA (pT2, pN1a(sn), cM0, G3, ER+, MN+, HER2-, Oncotype DX score: 10) - Signed by Zoë Alejandra MD       She is doing well.      She is to complete Letrozole in December of 2026 for 5 yrs of treatment.    B breast done in June of 2024, BIRADS-2.  Next due in June 2025.    DEXA in Sept 2023 was normal, next due in Sept of 2025.    Lymphedema of the breast and axillary web syndrome:  refer to PT.       Return in about 6 months (around 12/18/2024) for surveillance, monitoring medication, follow-up.      MDM moderate.     Data:       [facility] CHELA 2D+3D SCREENING BILAT (CPT=[id number]) [[id number]]Collected: 06/07/24 1544 Order Status: CompletedUpdated: 06/07/24 1544 Narrative:  PROCEDURE: [facility] CHELA 2D+3D SCREENING BILAT ([id number])      COMPARISON: [facility], [facility] CHELA 2D+3D DIAGNOSTIC SUSANA BILAT ([id number]=[id number]), 5/02/2022, 8:44 AM.  [facility], [facility] CHELA 2D+3D DIAGNOSTIC SUSANA RIGHT ([id number]=[id number]), 12/01/2022, 7:21 AM.  [facility], [facility] CHELA 2D+3D DIAGNOSTIC SUSANA BILAT ([id number]), 6/06/2023, 8:43 AM.  [facility], S[facility]NA CHELA 2D+3D DIAGNOSTIC SUSANA RIGHT ([id number]), 11/29/2023, 6:55 AM.      INDICATIONS: Z17.0 Malignant neoplasm of upper-outer quadrant of right breast in female, estrogen receptor positive (HCC) C50.411 Malignant neoplasm of upper-outer quadrant*      TECHNIQUE: Full field direct screening mammography was performed and images were reviewed with the [employer] PILAR 1.5.1.5 CAD device.  3D tomosynthesis was performed and reviewed         BREAST COMPOSITION:   Category b-Scattered areas fibroglandular density.         FINDINGS: Surgical changes consistent with right lumpectomy are noted.  There is no suspicious asymmetry, mass, architectural distortion, or microcalcifications identified in either breast.  Again seen are multiple enlarged lymph nodes in the left   axilla, unchanged since at least 2022.  Fat necrosis is noted in the right breast.                 Impression:  CONCLUSION:   There is no mammographic evidence of malignancy  in either breast. As long as patient's clinical breast exam remains unchanged, annual screening mammogram is recommended.      BI-RADS CATEGORY:     DIAGNOSTIC CATEGORY 2--BENIGN FINDING:       RECOMMENDATIONS:   ROUTINE MAMMOGRAM AND CLINICAL EVALUATION IN 12 MONTHS.                   PLEASE NOTE: NORMAL MAMMOGRAM DOES NOT EXCLUDE THE POSSIBILITY OF BREAST CANCER.  A CLINICALLY SUSPICIOUS PALPABLE LUMP SHOULD BE BIOPSIED.       For patients over the age of 40, the target due date for the patient's next mammogram has been entered into a reminder system.       Patient received a discharge summary from the technologist after completion of exam.      Breast marker legend used on images    Triangle = Palpable lump   Dupont = Skin tag or mole   BB = Nipple   Linear fiona = Scar   Square = Pain            Dictated by (CST): Honorio Murray DO on 6/07/2024 at 3:42 PM       Finalized by (CST): Honorio Murray DO on 6/07/2024 at 3:43 PM    Narrative   This is an over read for the non-cardiac, non-vascular limited visualized portions of the chest and abdomen.      PROCEDURE:  CT CALCIUM SCORING OVER READ      COMPARISON:  None.      INDICATIONS:  Z13.6 Screening for cardiovascular condition      TECHNIQUE:  Unenhanced multislice CT scanning is performed through the chest as part of a cardiac CT evaluation.  Dose reduction techniques were used. Dose information is transmitted to the ACR (American College of Radiology) NRDR (National Radiology   Data Registry) which includes the Dose Index Registry.      PATIENT STATED HISTORY: (As transcribed by Technologist)           FINDINGS:    LUNGS:  No focal consolidation.  Right upper lobe and to a lesser extent right middle lobe subpleural interstitial density with pleural thickening.  Lingular subpleural 2-3 mm calcified granuloma.   GINA:  No mass or adenopathy.    MEDIASTINUM:  No mass or adenopathy.    PLEURA:  No mass or effusion.    CHEST WALL:  No mass or axillary  adenopathy.    LIMITED ABDOMEN:  Limited images of the upper abdomen are unremarkable.    BONES:  Mild degenerative change.  No fracture.          This is an over read for the non-cardiac, non-vascular limited visualized portions of the chest and abdomen. This exam was not performed as a complete diagnostic CT of the chest. Please see the cardiologists' dictation which is reported separately.                     Impression   CONCLUSION:    1. Right upper and middle lobe subpleural interstitial densities with pleural thickening suggest chronic changes, correlate clinically with post treatment changes.  If patient has prior CTs of the chest, these would be helpful for further evaluation and   to assess stability of the above findings.            LOCATION:  [facility]         Dictated by (CST): Brisa Umana MD on 4/18/2024 at 4:06 PM       Finalized by (CST): Brisa Umana MD on 4/18/2024 at 4:09 PM

## 2024-06-19 ENCOUNTER — TELEPHONE (OUTPATIENT)
Dept: PHYSICAL THERAPY | Facility: HOSPITAL | Age: 59
End: 2024-06-19

## 2024-06-21 ENCOUNTER — TELEPHONE (OUTPATIENT)
Dept: PHYSICAL THERAPY | Facility: HOSPITAL | Age: 59
End: 2024-06-21

## 2024-06-24 ENCOUNTER — OFFICE VISIT (OUTPATIENT)
Dept: PHYSICAL THERAPY | Facility: HOSPITAL | Age: 59
End: 2024-06-24
Attending: INTERNAL MEDICINE

## 2024-06-24 DIAGNOSIS — I89.0 LYMPHEDEMA OF BREAST: Primary | ICD-10-CM

## 2024-06-24 DIAGNOSIS — L90.5 AXILLARY WEB SYNDROME: ICD-10-CM

## 2024-06-24 DIAGNOSIS — L76.82 AXILLARY WEB SYNDROME: ICD-10-CM

## 2024-06-24 PROCEDURE — 97110 THERAPEUTIC EXERCISES: CPT | Performed by: PHYSICAL THERAPIST

## 2024-06-24 PROCEDURE — 97140 MANUAL THERAPY 1/> REGIONS: CPT | Performed by: PHYSICAL THERAPIST

## 2024-06-24 PROCEDURE — 97161 PT EVAL LOW COMPLEX 20 MIN: CPT | Performed by: PHYSICAL THERAPIST

## 2024-06-24 NOTE — PROGRESS NOTES
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)  I89.0 lymphedema     Date of onset: 6/24/2024 Evaluation Date: 6/24/2024         LYMPHEDEMA EVALUATION:        PATIENT SUMMARY   Tiana Abreu is a 59 year old female who presents to therapy today For lymphedema evaluation   History of current condition: R breast cancer, 8/2/2021 lumpectomy w/ SLNB (1+/6). Pt seen 2 years ago for lymphedema of the breast. Pt starting feeling onset of tightness and swelling R axilla about 1 month ago   Pain level: currently no pain, but reports occasional \"spot of pain\"  on ribs    Current functional limitations  independent w/ ADLs but w/ tightness and risks for infections due to lymphedema    Tiana describes prior level of function as independent w/ ADLs    Social History:  Lives w/ family, in the process of moving to [place]   Hand Dominance: right handed  Pt goals include to decrease swelling     Are you being hurt, frightened, demeaned, or taken advantage of by anyone at your home or in your life?  No        Have you recently had thoughts of hurting yourself?  No    Have you tried to hurt yourself in the past?  No        Past medical history was reviewed with Tiana.   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     Reason for lymphedema:  Secondary Lymphedema, appears to be due to breast cancer   Stage of lymphedema: 2  Phase of treatment: Phase 1: Reduction Phase  Has patient been treated for lymphedema before, if yes, when: 2 years ago  Does the patient have a compression garment: yes        Pt and therapist discussed evaluation findings, lymphedema education, POC and self care/management. Skilled Physical Therapy is medically necessary for stretching, strengthening, posture re-education,  Complete Decongestive Therapy to reduce swelling and decrease risk of infection, garment prescription , compression device prescription and education/self management.    OBJECTIVE:     6/24/2024 (Evaluation):  Sensation:  Reports occasional numbness and tingling R UE     AROM/Strength:    B shld AROM except IR R: T12   Shoulder strength: L 5/5, R 4/5  Scap strength   Rhomb 4/5 B   Mid trap R 4-/5, L 4/5  Trigger finger R 4th finger    Posture: slightly rounded shld     Edema/Tissue Observations:    - swelling R breast and trunk (hyperplasia/skin thickening noted    - trunk measurement (6 cm superior to R nipple): 105.6                - breast measurement  (6 cm from nipple): R: 38.1 cm, (41 cm),. L: 37.8 cm   - swelling R axilla   - cording and fascial tightness R axilla down to forearm, R lateral breast       Stemmer's Sign: negative   Arm Volume Measurements:       6/24/2024     6:00 AM 4/29/2022     7:00 AM 3/23/2022     8:00 AM 2/18/2022     7:00 AM 2/4/2022     6:00 AM 1/12/2022     6:00 AM   Lymphedema Calculations   DATE MEASURED 6/24/2024 4/29/2022 3/23/2022 2/18/2022 2/4/2022 1/12/2022   LOCATION/MEASUREMENTS RUE RUE RUE RUE RUE RUE   PATIENT POSITION  supine supine supine supine supine supine   LIMB POSITION 75 degrees abd 75 degrees abd 75 degrees abd 75 degrees abd 75 degrees abd 75 degrees abd   STARTING POINT 17 cm from 3rd cuticle 17 cm from 3rd cuticle 17 cm from 3rd cuticle 17 cm from 3rd cuticle 17 cm from 3rd cuticle 17 cm from 3rd cuticle    RIGHT HAND VOLUME 19.5 across palm 19.6 across palm 19.6 across palm 19.6 across palm 19.6 across palm 19.6 across palm   LEFT HAND VOLUME 19.6 across palm 19.7 across palm 19.7 across palm 19.7 across palm 19.7 across palm 19.7 across palm   MEASUREMENT A 15.5 15.9 15.6 15.8 15.8 15.8   MEASUREMENT B 17.5 18.8 18.5 18.8 18.8 19   MEASUREMENT C 21.1 23.1 22 22.9 22 23.1   MEASUREMENT D 24.5 26.4 25.6 26.1 26.3 26.9   MEASUREMENT E 26.9 26.8 27.1 27.4 27.6 28.3   MEASUREMENT F 29.1 32.2 32.1 33 32.8 34.8   MEASUREMENT G 36.3 37.1 38 37.8 38.3 39.8   MEASUREMENT H 38.4 39.4 39.5 39.9 40.6 41.8   MEASUREMENT I 39.8 41.8 41.2 41.5 42.5 42.6    TOTAL VOLUME  3712.56202 2941.70058 2921.10551 2996.42520 3038.38633 3233.11664   DATE MEASURED 6/24/2024 4/29/2022 3/23/2022 2/18/2022 2/4/2022 1/12/2022   LOCATION/MEASUREMENTS LUE LUE LUE LUE LUE LUE   MEASUREMENT A 15.7 15.8 15.7 15.6 15.8 16   MEASUREMENT B 17.1 18.2 18.6 18.6 18.4 18.4   MEASUREMENT C 20.3 21.8 22.4 22 21.8 22.8   MEASUREMENT D 24.1 26 25.8 25.8 25.7 26.2   MEASUREMENT E 26.8 28.4 27.9 28.4 28.6 28.8   MEASUREMENT F 28.5 34.1 34.6 34.2 33.6 35.1   MEASUREMENT G 36.8 38.4 38.1 38.7 38.6 39.2   MEASUREMENT H 39.6 40.2 40.3 40.3 40.6 41.9   MEASUREMENT I 40.3 41.4 41.3 41.6 41.9 42.1    TOTAL VOLUME  5968.79703 3046.68340 3050.41573 3068.43113 3061.52543 3201.21331   % DIFFERENCE -0.08784 -3.93798 -4.36661 -2.26230 -0.54424 1.84217            Lymphedema Life Impact Scale: Evaluation Score 6/24/2024: NA, no arm lymphedema (breast/trunk only)        Today’s Treatment and Response:   Date 6/24/2024 Date: * Date: * Date: * Date: * Date: *   Visit # 1/8  O expires 9/30/2024  Certification From: 6/24/2024  To:9/22/2024    Visit # 2/* Visit: #3/* Visit: #4/* Visit: #5/* Visit: #6/*   Manual Therapy (35 minutes)    STM and stretching of cording (R axilla, RUE, R breast)    MLD: neck sequence, abd sequence, R inguinal, R A-I (supine and sidely), L axilla, A-A (ant  and post), R axilla, R breast, RUE        Compression:  - pt to bring in all her garments for assessment (pt has had weight loss and may need new ones)              There Ex ( 10 minutes):  - SHld ER GTB x 10  - Shld Ext GTB x 10  - Scap retr x 10           ThereAct (5 min):       Self-Care:  Management/Education: Explained Lymphedema diagnosis; informed patient of lymphedema precautions and risk reduction practices, reviewed eval finding            Charges: Physical Therapy Eval: Low Complexity, mm2,ex1      Total Timed Treatment: 45 min     Total Treatment Time: 90 min     Based on clinical rationale and outcome measures, this evaluation is   stable (low eval)    PLAN OF CARE:    Goals (discussed and planned with patient involvement):      To be met in 8 visits:  1) Decrease swelling R breast/trunk by a total of 2 cm to decrease risks of infections  2) Pt to be independent with self MLD, ROM exercises, and donning/doffing compression bandages/garments to facilitate swelling reduction and to be independent at self management once discharged  3) Increase R shoulder strength to 5/5 to improve ease of lifting/moving boxes  4) Increase B scap strength (rhomb and mid trap) to at least 4+/5 to improve ease of lifting and performing household chores      PLAN:  Frequency / Duration: Patient will be seen for 1-2 x/week or a total of 8 visits over a 90 day period. Frequency will decrease as symptoms improve.     Treatment will include: Complete Decongestive Therapy: Manual Therapy, Self-Care/Home Management Training, Therapeutic Exercise, Neuromuscular Re-education, Orthotic Management and Training      Patient/Family/Caregiver was advised of these findings, precautions, and treatment options and has agreed to actively participate in planning and for this course of care.    Thank you for your referral. Please co-sign or sign and return this letter via fax as soon as possible to [phone]. If you have any questions,  please contact me at Dept: [phone]    Sincerely,  Electronically signed by therapist: Fawn Roberto PT, DPT, CLT-FINA  Physician's certification required: Yes  I certify the need for these services furnished under this plan of treatment and while under my care.    X___________________________________________________ Date____________________    Certification From: 6/24/2024  To:9/22/2024

## 2024-06-27 ENCOUNTER — OFFICE VISIT (OUTPATIENT)
Dept: PHYSICAL THERAPY | Facility: HOSPITAL | Age: 59
End: 2024-06-27
Attending: INTERNAL MEDICINE

## 2024-06-27 PROCEDURE — 97140 MANUAL THERAPY 1/> REGIONS: CPT

## 2024-06-27 PROCEDURE — 97530 THERAPEUTIC ACTIVITIES: CPT

## 2024-06-27 NOTE — PROGRESS NOTES
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      Objective:    6/24/2024 (Evaluation):  Sensation:  Reports occasional numbness and tingling R UE      AROM/Strength:    B shld AROM except IR R: T12   Shoulder strength: L 5/5, R 4/5  Scap strength               Rhomb 4/5 B               Mid trap R 4-/5, L 4/5  Trigger finger R 4th finger     Posture: slightly rounded shld      Edema/Tissue Observations:    - swelling R breast and trunk (hyperplasia/skin thickening noted                - trunk measurement (6 cm superior to R nipple): 105.6                - breast measurement  (6 cm from nipple): R: 38.1 cm, (41 cm),. L: 37.8 cm   - swelling R axilla   - cording and fascial tightness R axilla down to forearm, R lateral breast         Stemmer's Sign: negative   Arm Volume Measurements:        6/24/2024     6:00 AM 4/29/2022     7:00 AM 3/23/2022     8:00 AM 2/18/2022     7:00 AM 2/4/2022     6:00 AM 1/12/2022     6:00 AM   Lymphedema Calculations   DATE MEASURED 6/24/2024 4/29/2022 3/23/2022 2/18/2022 2/4/2022 1/12/2022   LOCATION/MEASUREMENTS RUE RUE RUE RUE RUE RUE   PATIENT POSITION  supine supine supine  supine supine supine   LIMB POSITION 75 degrees abd 75 degrees abd 75 degrees abd 75 degrees abd 75 degrees abd 75 degrees abd   STARTING POINT 17 cm from 3rd cuticle 17 cm from 3rd cuticle 17 cm from 3rd cuticle 17 cm from 3rd cuticle 17 cm from 3rd cuticle 17 cm from 3rd cuticle   RIGHT HAND VOLUME 19.5 across palm 19.6 across palm 19.6 across palm 19.6 across palm 19.6 across palm 19.6 across palm   LEFT HAND VOLUME 19.6 across palm 19.7 across palm 19.7 across palm 19.7 across palm 19.7 across palm 19.7 across palm   MEASUREMENT A 15.5 15.9 15.6 15.8 15.8 15.8   MEASUREMENT B 17.5 18.8 18.5 18.8 18.8 19   MEASUREMENT C 21.1 23.1 22 22.9 22 23.1   MEASUREMENT D 24.5 26.4 25.6 26.1 26.3 26.9   MEASUREMENT E 26.9 26.8 27.1 27.4 27.6 28.3   MEASUREMENT F 29.1 32.2 32.1 33 32.8 34.8   MEASUREMENT G 36.3 37.1 38 37.8 38.3 39.8   MEASUREMENT H 38.4 39.4 39.5 39.9 40.6 41.8   MEASUREMENT I 39.8 41.8 41.2 41.5 42.5 42.6    TOTAL VOLUME  4152.38054 2941.63868 2921.03741 2996.22795 3038.06692 3233.81096   DATE MEASURED 6/24/2024 4/29/2022 3/23/2022 2/18/2022 2/4/2022 1/12/2022   LOCATION/MEASUREMENTS LUE LUE LUE LUE LUE LUE   MEASUREMENT A 15.7 15.8 15.7 15.6 15.8 16   MEASUREMENT B 17.1 18.2 18.6 18.6 18.4 18.4   MEASUREMENT C 20.3 21.8 22.4 22 21.8 22.8   MEASUREMENT D 24.1 26 25.8 25.8 25.7 26.2   MEASUREMENT E 26.8 28.4 27.9 28.4 28.6 28.8   MEASUREMENT F 28.5 34.1 34.6 34.2 33.6 35.1   MEASUREMENT G 36.8 38.4 38.1 38.7 38.6 39.2   MEASUREMENT H 39.6 40.2 40.3 40.3 40.6 41.9   MEASUREMENT I 40.3 41.4 41.3 41.6 41.9 42.1    TOTAL VOLUME  9409.80090 3046.98137 3050.73965 3068.53838 3061.05246 3201.66043   % DIFFERENCE -0.32380 -3.83364 -4.33788 -2.79323 -0.83512 1.02352                          Lymphedema Life Impact Scale: Evaluation Score 6/24/2024: NA, no arm lymphedema (breast/trunk only)           Today’s Treatment and Response:   Date 6/24/2024 Date: 6/27/2024 Date: * Date: * Date: * Date: *   Visit # 1/8  [facility]  [place] 9[place]30/2024  Certification From: 6/24/2024  To:9/22/2024     Visit # 2/8  HMO expires 9/30/2024  Certification From: 6/24/2024  To:9/22/2024  Visit: #3/* Visit: #4/* Visit: #5/* Visit: #6/*   Manual Therapy (35 minutes)     STM and stretching of cording (R axilla, RUE, R breast)     MLD: neck sequence, abd sequence, R inguinal, R A-I (supine and sidely), L axilla, A-A (ant and post), R axilla, R breast, RUE           Compression:  - pt to bring in all her garments for assessment (pt has had weight loss and may need new ones)           Manual therapy (55 minutes)      STM and stretching of cording (R axilla, RUE, R breast)     MLD: neck sequence, abd sequence, R inguinal, R A-I (supine and sidely), L axilla, A-A (ant and post), R axilla, R breast, RUE              There Ex ( 10 minutes):  - SHld ER GTB x 10  - Shld Ext GTB x 10  - Scap retr x 10      Reviewed but will continue at home.  Additional green band issued to patient today.           ThereAct (5 min):         Self-Care:  Management/Education: Explained Lymphedema diagnosis; informed patient of lymphedema precautions and risk reduction practices, reviewed eval finding  There Activity (30 minutes)    -reviewed step by step instructions of self MLD  -reviewed breast massage  -discussed skin stretch, gentle pressure, using flats of hands.    -massage when comfortable (shower may be a good time)  -new handouts issued.               Assessment: Reviewed full instructions on self MLD and breast massage.     Goals:   Goals (discussed and planned with patient involvement):      To be met in 8 visits:  1) Decrease swelling R breast/trunk by a total of 2 cm to decrease risks of infections  2) Pt to be independent with self MLD, ROM exercises, and donning/doffing compression bandages/garments to facilitate swelling reduction and to be independent at self management once discharged  3) Increase R shoulder strength to 5/5 to improve ease of lifting/moving boxes  4)  Increase B scap strength (rhomb and mid trap) to at least 4+/5 to improve ease of lifting and performing household chores      Plan:    Frequency / Duration: Patient will be seen for 1-2 x/week or a total of 8 visits over a 90 day period. Frequency will decrease as symptoms improve.      Treatment will include: Complete Decongestive Therapy: Manual Therapy, Self-Care/Home Management Training, Therapeutic Exercise, Neuromuscular Re-education, Orthotic Management and Training        Charges: MM4, There act2   Total Timed Treatment: 85 min  Total Treatment Time: 85 min

## 2024-07-01 ENCOUNTER — OFFICE VISIT (OUTPATIENT)
Dept: PHYSICAL THERAPY | Facility: HOSPITAL | Age: 59
End: 2024-07-01
Attending: INTERNAL MEDICINE
Payer: COMMERCIAL

## 2024-07-01 PROCEDURE — 97140 MANUAL THERAPY 1/> REGIONS: CPT | Performed by: PHYSICAL THERAPIST

## 2024-07-01 NOTE — PROGRESS NOTES
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    Hypothyroidism    Morbid obesity with BMI of 40.0-44.9, adult (HCC)    KIRTI on CPAP    Sleep apnea    Visual impairment    glasses         Pain:  4-5/10 R 4th finger, R ribgs    Subjective: \"My finger really bothers me\"     Objective:    7/1/2024:  Cording R abdomen area  Several areas of fascial tightness RUE   Swelling R breast       6/24/2024 (Evaluation):  Sensation:  Reports occasional numbness and tingling R UE      AROM/Strength:    B shld AROM except IR R: T12   Shoulder strength: L 5/5, R 4/5  Scap strength               Rhomb 4/5 B               Mid trap R 4-/5, L 4/5  Trigger finger R 4th finger     Posture: slightly rounded shld      Edema/Tissue Observations:    - swelling R breast and trunk (hyperplasia/skin thickening noted                - trunk measurement (6 cm superior to R nipple): 105.6                - breast measurement  (6 cm from nipple): R: 38.1 cm, (41 cm),. L: 37.8 cm   - swelling R axilla   - cording and fascial tightness R axilla down to forearm, R lateral breast         Stemmer's Sign: negative   Arm Volume Measurements:        6/24/2024     6:00 AM 4/29/2022     7:00 AM 3/23/2022     8:00 AM 2/18/2022     7:00 AM 2/4/2022     6:00 AM 1/12/2022     6:00 AM   Lymphedema Calculations   DATE MEASURED 6/24/2024 4/29/2022 3/23/2022 2/18/2022 2/4/2022 1/12/2022   LOCATION/MEASUREMENTS RUE RUE RUE RUE RUE RUE   PATIENT POSITION   supine supine supine supine supine supine   LIMB POSITION 75 degrees abd 75 degrees abd 75 degrees abd 75 degrees abd 75 degrees abd 75 degrees abd   STARTING POINT 17 cm from 3rd cuticle 17 cm from 3rd cuticle 17 cm from 3rd cuticle 17 cm from 3rd cuticle 17 cm from 3rd cuticle 17 cm from 3rd cuticle   RIGHT HAND VOLUME 19.5 across palm 19.6 across palm 19.6 across palm 19.6 across palm 19.6 across palm 19.6 across palm   LEFT HAND VOLUME 19.6 across palm 19.7 across palm 19.7 across palm 19.7 across palm 19.7 across palm 19.7 across palm   MEASUREMENT A 15.5 15.9 15.6 15.8 15.8 15.8   MEASUREMENT B 17.5 18.8 18.5 18.8 18.8 19   MEASUREMENT C 21.1 23.1 22 22.9 22 23.1   MEASUREMENT D 24.5 26.4 25.6 26.1 26.3 26.9   MEASUREMENT E 26.9 26.8 27.1 27.4 27.6 28.3   MEASUREMENT F 29.1 32.2 32.1 33 32.8 34.8   MEASUREMENT G 36.3 37.1 38 37.8 38.3 39.8   MEASUREMENT H 38.4 39.4 39.5 39.9 40.6 41.8   MEASUREMENT I 39.8 41.8 41.2 41.5 42.5 42.6    TOTAL VOLUME  4952.90693 2941.86817 2921.20187 2996.62011 3038.46160 3233.46715   DATE MEASURED 6/24/2024 4/29/2022 3/23/2022 2/18/2022 2/4/2022 1/12/2022   LOCATION/MEASUREMENTS LUE LUE LUE LUE LUE LUE   MEASUREMENT A 15.7 15.8 15.7 15.6 15.8 16   MEASUREMENT B 17.1 18.2 18.6 18.6 18.4 18.4   MEASUREMENT C 20.3 21.8 22.4 22 21.8 22.8   MEASUREMENT D 24.1 26 25.8 25.8 25.7 26.2   MEASUREMENT E 26.8 28.4 27.9 28.4 28.6 28.8   MEASUREMENT F 28.5 34.1 34.6 34.2 33.6 35.1   MEASUREMENT G 36.8 38.4 38.1 38.7 38.6 39.2   MEASUREMENT H 39.6 40.2 40.3 40.3 40.6 41.9   MEASUREMENT I 40.3 41.4 41.3 41.6 41.9 42.1    TOTAL VOLUME  0579.12854 3046.03270 3050.84787 3068.29647 3061.37843 3201.33716   % DIFFERENCE -0.61410 -3.99625 -4.39802 -2.40153 -0.20609 1.02123                          Lymphedema Life Impact Scale: Evaluation Score 6/24/2024: NA, no arm lymphedema (breast/trunk only)           Today’s Treatment and Response:   Date 6/24/2024 Date: 6/27/2024 Date: 7/1/2024 Date: * Date: *  Date: *   Visit # 1/8  HMO expires 9/30/2024  Certification From: 6/24/2024  To:9/22/2024     Visit # 2/8  HMO expires 9/30/2024  Certification From: 6/24/2024  To:9/22/2024  Visit: #3/8  HMO expires 9/30/2024  Certification From: 6/24/2024  To:9/22/2024  Visit: #4/* Visit: #5/* Visit: #6/*   Manual Therapy (35 minutes)     STM and stretching of cording (R axilla, RUE, R breast)     MLD: neck sequence, abd sequence, R inguinal, R A-I (supine and sidely), L axilla, A-A (ant and post), R axilla, R breast, RUE           Compression:  - pt to bring in all her garments for assessment (pt has had weight loss and may need new ones)           Manual therapy (55 minutes)      STM and stretching of cording (R axilla, RUE, R breast)     MLD: neck sequence, abd sequence, R inguinal, R A-I (supine and sidely), L axilla, A-A (ant and post), R axilla, R breast, RUE     Manual therapy (45 min)    STM and stretching of cording R abd- release noted    STM and manual stretching of RUE (areas of fascial tightness)      MLD: neck sequence, abd sequence, R inguinal, R A-I (supine and sidely), L axilla, A-A (ant and post), R axilla, R breast, RUE          There Ex ( 10 minutes):  - SHld ER GTB x 10  - Shld Ext GTB x 10  - Scap retr x 10      Reviewed but will continue at home.  Additional green band issued to patient today.           ThereAct (5 min):         Self-Care:  Management/Education: Explained Lymphedema diagnosis; informed patient of lymphedema precautions and risk reduction practices, reviewed eval finding  There Activity (30 minutes)    -reviewed step by step instructions of self MLD  -reviewed breast massage  -discussed skin stretch, gentle pressure, using flats of hands.    -massage when comfortable (shower may be a good time)  -new handouts issued.               Assessment: Fascial tightness may be source of pain R 4th finger and cording limiting ROM of RUE.. Swelling of breast appears to be decreasing.     Goals:   Goals  (discussed and planned with patient involvement):      To be met in 8 visits:  1) Decrease swelling R breast/trunk by a total of 2 cm to decrease risks of infections  2) Pt to be independent with self MLD, ROM exercises, and donning/doffing compression bandages/garments to facilitate swelling reduction and to be independent at self management once discharged  3) Increase R shoulder strength to 5/5 to improve ease of lifting/moving boxes  4) Increase B scap strength (rhomb and mid trap) to at least 4+/5 to improve ease of lifting and performing household chores      Plan:    Frequency / Duration: Patient will be seen for 1-2 x/week or a total of 8 visits over a 90 day period. Frequency will decrease as symptoms improve.      Treatment will include: Complete Decongestive Therapy: Manual Therapy, Self-Care/Home Management Training, Therapeutic Exercise, Neuromuscular Re-education, Orthotic Management and Training        Charges: mm3   Total Timed Treatment: 45 min  Total Treatment Time: 45 min

## 2024-07-05 ENCOUNTER — APPOINTMENT (OUTPATIENT)
Dept: PHYSICAL THERAPY | Facility: HOSPITAL | Age: 59
End: 2024-07-05
Attending: INTERNAL MEDICINE
Payer: COMMERCIAL

## 2024-07-15 ENCOUNTER — OFFICE VISIT (OUTPATIENT)
Dept: PHYSICAL THERAPY | Facility: HOSPITAL | Age: 59
End: 2024-07-15
Attending: INTERNAL MEDICINE
Payer: COMMERCIAL

## 2024-07-15 PROCEDURE — 97140 MANUAL THERAPY 1/> REGIONS: CPT

## 2024-07-15 NOTE — PROGRESS NOTES
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  \"It is hard to tie my shoes.\"    Objective:    7/1/2024:  Cording R abdomen area  Several areas of fascial tightness RUE   Swelling R breast       6/24/2024 (Evaluation):  Sensation:  Reports occasional numbness and tingling R UE      AROM/Strength:    B shld AROM except IR R: T12   Shoulder strength: L 5/5, R 4/5  Scap strength               Rhomb 4/5 B               Mid trap R 4-/5, L 4/5  Trigger finger R 4th finger     Posture: slightly rounded shld      Edema/Tissue Observations:    - swelling R breast and trunk (hyperplasia/skin thickening noted                - trunk measurement (6 cm superior to R nipple): 105.6                - breast measurement  (6 cm from nipple): R: 38.1 cm, (41 cm),. L: 37.8 cm   - swelling R axilla   - cording and fascial tightness R axilla down to forearm, R lateral breast         Stemmer's Sign: negative   Arm Volume Measurements:        6/24/2024     6:00 AM 4/29/2022     7:00 AM 3/23/2022     8:00 AM 2/18/2022     7:00 AM 2/4/2022     6:00 AM 1/12/2022     6:00 AM   Lymphedema Calculations   DATE MEASURED 6/24/2024 4/29/2022 3/23/2022 2/18/2022 2/4/2022 1/12/2022    LOCATION/MEASUREMENTS RUE RUE RUE RUE RUE RUE   PATIENT POSITION  supine supine supine supine supine supine   LIMB POSITION 75 degrees abd 75 degrees abd 75 degrees abd 75 degrees abd 75 degrees abd 75 degrees abd   STARTING POINT 17 cm from 3rd cuticle 17 cm from 3rd cuticle 17 cm from 3rd cuticle 17 cm from 3rd cuticle 17 cm from 3rd cuticle 17 cm from 3rd cuticle   RIGHT HAND VOLUME 19.5 across palm 19.6 across palm 19.6 across palm 19.6 across palm 19.6 across palm 19.6 across palm   LEFT HAND VOLUME 19.6 across palm 19.7 across palm 19.7 across palm 19.7 across palm 19.7 across palm 19.7 across palm   MEASUREMENT A 15.5 15.9 15.6 15.8 15.8 15.8   MEASUREMENT B 17.5 18.8 18.5 18.8 18.8 19   MEASUREMENT C 21.1 23.1 22 22.9 22 23.1   MEASUREMENT D 24.5 26.4 25.6 26.1 26.3 26.9   MEASUREMENT E 26.9 26.8 27.1 27.4 27.6 28.3   MEASUREMENT F 29.1 32.2 32.1 33 32.8 34.8   MEASUREMENT G 36.3 37.1 38 37.8 38.3 39.8   MEASUREMENT H 38.4 39.4 39.5 39.9 40.6 41.8   MEASUREMENT I 39.8 41.8 41.2 41.5 42.5 42.6    TOTAL VOLUME  2682.62450 2941.46898 2921.13152 2996.34619 3038.84629 3233.20438   DATE MEASURED 6/24/2024 4/29/2022 3/23/2022 2/18/2022 2/4/2022 1/12/2022   LOCATION/MEASUREMENTS LUE LUE LUE LUE LUE LUE   MEASUREMENT A 15.7 15.8 15.7 15.6 15.8 16   MEASUREMENT B 17.1 18.2 18.6 18.6 18.4 18.4   MEASUREMENT C 20.3 21.8 22.4 22 21.8 22.8   MEASUREMENT D 24.1 26 25.8 25.8 25.7 26.2   MEASUREMENT E 26.8 28.4 27.9 28.4 28.6 28.8   MEASUREMENT F 28.5 34.1 34.6 34.2 33.6 35.1   MEASUREMENT G 36.8 38.4 38.1 38.7 38.6 39.2   MEASUREMENT H 39.6 40.2 40.3 40.3 40.6 41.9   MEASUREMENT I 40.3 41.4 41.3 41.6 41.9 42.1    TOTAL VOLUME  8259.24590 3046.94522 3050.59453 3068.13851 3061.82839 3201.46116   % DIFFERENCE -0.27131 -3.40221 -4.51025 -2.76356 -0.11887 1.25747                          Lymphedema Life Impact Scale: Evaluation Score 6/24/2024: NA, no arm lymphedema (breast/trunk only)           Today’s Treatment and Response:    Date 6/24/2024 Date: 6/27/2024 Date: 7/1/2024 Date: 7/15/2024 Date: * Date: *   Visit # 1/8  HMO expires 9/30/2024  Certification From: 6/24/2024  To:9/22/2024     Visit # 2/8  HMO expires 9/30/2024  Certification From: 6/24/2024  To:9/22/2024  Visit: #3/8  HMO expires 9/30/2024  Certification From: 6/24/2024  To:9/22/2024  Visit: #4/8  HMO expires 9/30/2024  Certification From: 6/24/2024  To:9/22/2024  Visit: #5/* Visit: #6/*   Manual Therapy (35 minutes)     STM and stretching of cording (R axilla, RUE, R breast)     MLD: neck sequence, abd sequence, R inguinal, R A-I (supine and sidely), L axilla, A-A (ant and post), R axilla, R breast, RUE           Compression:  - pt to bring in all her garments for assessment (pt has had weight loss and may need new ones)           Manual therapy (55 minutes)      STM and stretching of cording (R axilla, RUE, R breast)     MLD: neck sequence, abd sequence, R inguinal, R A-I (supine and sidely), L axilla, A-A (ant and post), R axilla, R breast, RUE     Manual therapy (45 min)    STM and stretching of cording R abd- release noted    STM and manual stretching of RUE (areas of fascial tightness)      MLD: neck sequence, abd sequence, R inguinal, R A-I (supine and sidely), L axilla, A-A (ant and post), R axilla, R breast, RUE   Manual therapy (40 minutes)      STM and stretching of cording R abd- release noted    STM and manual stretching of RUE (areas of fascial tightness)      MLD: neck sequence, abd sequence, R inguinal, R A-I (supine and sidely), L axilla, A-A (ant and post), R axilla, R breast, RUE        There Ex ( 10 minutes):  - SHld ER GTB x 10  - Shld Ext GTB x 10  - Scap retr x 10      Reviewed but will continue at home.  Additional green band issued to patient today.           ThereAct (5 min):         Self-Care:  Management/Education: Explained Lymphedema diagnosis; informed patient of lymphedema precautions and risk reduction practices, reviewed eval finding  There  Activity (30 minutes)    -reviewed step by step instructions of self MLD  -reviewed breast massage  -discussed skin stretch, gentle pressure, using flats of hands.    -massage when comfortable (shower may be a good time)  -new handouts issued.               Assessment: Fascial tightness continues to limit pt's use of 4th digit.      Goals:   Goals (discussed and planned with patient involvement):      To be met in 8 visits:  1) Decrease swelling R breast/trunk by a total of 2 cm to decrease risks of infections  2) Pt to be independent with self MLD, ROM exercises, and donning/doffing compression bandages/garments to facilitate swelling reduction and to be independent at self management once discharged  3) Increase R shoulder strength to 5/5 to improve ease of lifting/moving boxes  4) Increase B scap strength (rhomb and mid trap) to at least 4+/5 to improve ease of lifting and performing household chores      Plan:    Frequency / Duration: Patient will be seen for 1-2 x/week or a total of 8 visits over a 90 day period. Frequency will decrease as symptoms improve.      Treatment will include: Complete Decongestive Therapy: Manual Therapy, Self-Care/Home Management Training, Therapeutic Exercise, Neuromuscular Re-education, Orthotic Management and Training        Charges: mm3   Total Timed Treatment: 40 min  Total Treatment Time: 45 min

## 2024-07-19 ENCOUNTER — OFFICE VISIT (OUTPATIENT)
Dept: PHYSICAL THERAPY | Facility: HOSPITAL | Age: 59
End: 2024-07-19
Attending: INTERNAL MEDICINE
Payer: COMMERCIAL

## 2024-07-19 PROCEDURE — 97140 MANUAL THERAPY 1/> REGIONS: CPT | Performed by: PHYSICAL THERAPIST

## 2024-07-19 NOTE — PROGRESS NOTES
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 L: 37.3 cm.   - cording R abd and R axilla  - fascial tightness R UE       7/1/2024:  Cording R abdomen area  Several areas of fascial tightness RUE   Swelling R breast       6/24/2024 (Evaluation):  Sensation:  Reports occasional numbness and tingling R UE      AROM/Strength:    B shld AROM except IR R: T12   Shoulder strength: L 5/5, R 4/5  Scap strength               Rhomb 4/5 B               Mid trap R 4-/5, L 4/5  Trigger finger R 4th finger     Posture: slightly rounded shld      Edema/Tissue Observations:    - swelling R breast and trunk (hyperplasia/skin thickening noted                - trunk measurement (6 cm superior to R nipple): 105.6                - breast measurement  (6 cm from nipple): R: 38.1 cm, (41 cm),. L: 37.8 cm   - swelling R axilla   - cording and fascial tightness R axilla down to forearm, R  lateral breast         Stemmer's Sign: negative   Arm Volume Measurements:        6/24/2024     6:00 AM 4/29/2022     7:00 AM 3/23/2022     8:00 AM 2/18/2022     7:00 AM 2/4/2022     6:00 AM 1/12/2022     6:00 AM   Lymphedema Calculations   DATE MEASURED 6/24/2024 4/29/2022 3/23/2022 2/18/2022 2/4/2022 1/12/2022   LOCATION/MEASUREMENTS RUE RUE RUE RUE RUE RUE   PATIENT POSITION  supine supine supine supine supine supine   LIMB POSITION 75 degrees abd 75 degrees abd 75 degrees abd 75 degrees abd 75 degrees abd 75 degrees abd   STARTING POINT 17 cm from 3rd cuticle 17 cm from 3rd cuticle 17 cm from 3rd cuticle 17 cm from 3rd cuticle 17 cm from 3rd cuticle 17 cm from 3rd cuticle   RIGHT HAND VOLUME 19.5 across palm 19.6 across palm 19.6 across palm 19.6 across palm 19.6 across palm 19.6 across palm   LEFT HAND VOLUME 19.6 across palm 19.7 across palm 19.7 across palm 19.7 across palm 19.7 across palm 19.7 across palm   MEASUREMENT A 15.5 15.9 15.6 15.8 15.8 15.8   MEASUREMENT B 17.5 18.8 18.5 18.8 18.8 19   MEASUREMENT C 21.1 23.1 22 22.9 22 23.1   MEASUREMENT D 24.5 26.4 25.6 26.1 26.3 26.9   MEASUREMENT E 26.9 26.8 27.1 27.4 27.6 28.3   MEASUREMENT F 29.1 32.2 32.1 33 32.8 34.8   MEASUREMENT G 36.3 37.1 38 37.8 38.3 39.8   MEASUREMENT H 38.4 39.4 39.5 39.9 40.6 41.8   MEASUREMENT I 39.8 41.8 41.2 41.5 42.5 42.6    TOTAL VOLUME  2892.80031 2941.59698 2921.24382 2996.09933 3038.03993 3233.42024   DATE MEASURED 6/24/2024 4/29/2022 3/23/2022 2/18/2022 2/4/2022 1/12/2022   LOCATION/MEASUREMENTS LUE LUE LUE LUE LUE LUE   MEASUREMENT A 15.7 15.8 15.7 15.6 15.8 16   MEASUREMENT B 17.1 18.2 18.6 18.6 18.4 18.4   MEASUREMENT C 20.3 21.8 22.4 22 21.8 22.8   MEASUREMENT D 24.1 26 25.8 25.8 25.7 26.2   MEASUREMENT E 26.8 28.4 27.9 28.4 28.6 28.8   MEASUREMENT F 28.5 34.1 34.6 34.2 33.6 35.1   MEASUREMENT G 36.8 38.4 38.1 38.7 38.6 39.2   MEASUREMENT H 39.6 40.2 40.3 40.3 40.6 41.9   MEASUREMENT I 40.3 41.4 41.3 41.6 41.9 42.1     TOTAL VOLUME  2699.93073 3046.38020 3050.53218 3068.95155 3061.66573 3201.56057   % DIFFERENCE -0.59284 -3.31442 -4.88526 -2.31879 -0.69748 1.02804                          Lymphedema Life Impact Scale: Evaluation Score 6/24/2024: NA, no arm lymphedema (breast/trunk only)           Today’s Treatment and Response:   Date 6/24/2024 Date: 6/27/2024 Date: 7/1/2024 Date: 7/15/2024 Date: 7/19/2024 Date: *   Visit # 1/8  HMO expires 9/30/2024  Certification From: 6/24/2024  To:9/22/2024     Visit # 2/8  HMO expires 9/30/2024  Certification From: 6/24/2024  To:9/22/2024  Visit: #3/8  HMO expires 9/30/2024  Certification From: 6/24/2024  To:9/22/2024  Visit: #4/8  HMO expires 9/30/2024  Certification From: 6/24/2024  To:9/22/2024  Visit: #5/8  HMO expires 9/30/2024  Certification From: 6/24/2024  To:9/22/2024  Visit: #6/*    *progress shld and scap strengthening this visit     Manual Therapy (35 minutes)     STM and stretching of cording (R axilla, RUE, R breast)     MLD: neck sequence, abd sequence, R inguinal, R A-I (supine and sidely), L axilla, A-A (ant and post), R axilla, R breast, RUE           Compression:  - pt to bring in all her garments for assessment (pt has had weight loss and may need new ones)           Manual therapy (55 minutes)      STM and stretching of cording (R axilla, RUE, R breast)     MLD: neck sequence, abd sequence, R inguinal, R A-I (supine and sidely), L axilla, A-A (ant and post), R axilla, R breast, RUE     Manual therapy (45 min)    STM and stretching of cording R abd- release noted    STM and manual stretching of RUE (areas of fascial tightness)      MLD: neck sequence, abd sequence, R inguinal, R A-I (supine and sidely), L axilla, A-A (ant and post), R axilla, R breast, RUE   Manual therapy (40 minutes)      STM and stretching of cording R abd- release noted    STM and manual stretching of RUE (areas of fascial tightness)      MLD: neck sequence, abd sequence, R inguinal, R A-I (supine and  sidely), L axilla, A-A (ant and post), R axilla, R breast, RUE   Manual therapy (55 min)    Trunk measurements    STM and manual stretching of abd and R axilla (areas of cording)    STM and manual stretching of RUE/areas of fascial tightness    MLD techniques R breast, trunk, axilla, abd, and arm (after STM)       There Ex ( 10 minutes):  - SHld ER GTB x 10  - Shld Ext GTB x 10  - Scap retr x 10      Reviewed but will continue at home.  Additional green band issued to patient today.           ThereAct (5 min):         Self-Care:  Management/Education: Explained Lymphedema diagnosis; informed patient of lymphedema precautions and risk reduction practices, reviewed eval finding  There Activity (30 minutes)    -reviewed step by step instructions of self MLD  -reviewed breast massage  -discussed skin stretch, gentle pressure, using flats of hands.    -massage when comfortable (shower may be a good time)  -new handouts issued.               Assessment: Good reduction of trunk swelling, pt w/ less pain. Cording and fascial tightness persist but does not cause pain.     Goals:   Goals (discussed and planned with patient involvement):      To be met in 8 visits:  1) Decrease swelling R breast/trunk by a total of 2 cm to decrease risks of infections - MET  2) Pt to be independent with self MLD, ROM exercises, and donning/doffing compression bandages/garments to facilitate swelling reduction and to be independent at self management once discharged  3) Increase R shoulder strength to 5/5 to improve ease of lifting/moving boxes  4) Increase B scap strength (rhomb and mid trap) to at least 4+/5 to improve ease of lifting and performing household chores      Plan:   Decrease freq to once a week. Pt to do HEP to continue w/ stretching and strengthening. Will progress shld/scap strengthening next visit.      Treatment will include: Complete Decongestive Therapy: Manual Therapy, Self-Care/Home Management Training, Therapeutic  Exercise, Neuromuscular Re-education, Orthotic Management and Training        Charges: mm4  Total Timed Treatment: 55 min  Total Treatment Time: 55 min

## 2024-07-23 ENCOUNTER — APPOINTMENT (OUTPATIENT)
Dept: PHYSICAL THERAPY | Facility: HOSPITAL | Age: 59
End: 2024-07-23
Attending: INTERNAL MEDICINE
Payer: COMMERCIAL

## 2024-07-25 ENCOUNTER — OFFICE VISIT (OUTPATIENT)
Dept: PHYSICAL THERAPY | Facility: HOSPITAL | Age: 59
End: 2024-07-25
Attending: INTERNAL MEDICINE
Payer: COMMERCIAL

## 2024-07-25 PROCEDURE — 97110 THERAPEUTIC EXERCISES: CPT

## 2024-07-25 PROCEDURE — 97140 MANUAL THERAPY 1/> REGIONS: CPT

## 2024-07-25 NOTE — PROGRESS NOTES
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    Objective:    7/25/2024 7/25/2024     6:00 AM 6/24/2024     6:00 AM 4/29/2022     7:00 AM 3/23/2022     8:00 AM 2/18/2022     7:00 AM 2/4/2022     6:00 AM   Lymphedema Calculations   DATE MEASURED 7/25/2024 6/24/2024 4/29/2022 3/23/2022 2/18/2022 2/4/2022   LOCATION/MEASUREMENTS RUE RUE RUE [facility]   PATIENT POSITION  supine supine supine supine supine supine   LIMB POSITION 75 degrees abd 75 degrees abd 75 degrees abd 75 degrees abd 75 degrees abd 75 degrees abd   STARTING POINT 17 cm from 3rd cuticle 17 cm from 3rd cuticle 17 cm from 3rd cuticle 17 cm from 3rd cuticle 17 cm from 3rd cuticle 17 cm from 3rd cuticle   RIGHT HAND VOLUME 19.5 across palm 19.5 across palm 19.6 across palm 19.6 across palm 19.6 across palm 19.6 across palm   LEFT HAND VOLUME 19.6 across palm 19.6 across palm 19.7 across palm 19.7 across palm 19.7 across palm 19.7 across palm   MEASUREMENT A 15.5 15.5 15.9 15.6 15.8 15.8   MEASUREMENT B 17.7 17.5 18.8 18.5 18.8 18.8   MEASUREMENT C 21.3 21.1 23.1 22 22.9 22   MEASUREMENT D 24.5 24.5 26.4 25.6 26.1 26.3   MEASUREMENT E  26.8 26.9 26.8 27.1 27.4 27.6   MEASUREMENT F 29.4 29.1 32.2 32.1 33 32.8   MEASUREMENT G 36.4 36.3 37.1 38 37.8 38.3   MEASUREMENT H 38.1 38.4 39.4 39.5 39.9 40.6   MEASUREMENT I 39.5 39.8 41.8 41.2 41.5 42.5    TOTAL VOLUME  2122.79045 9492.70346 [id number] 5761.71569 2996.88894 3038.99355   DATE MEASURED 6/24/2024 6/24/2024 4/29/2022 3/23/2022 2/18/2022 2/4/2022   LOCATION/MEASUREMENTS LUE LUE LUE LUE LUE LUE   MEASUREMENT A 15.7 15.7 15.8 15.7 15.6 15.8   MEASUREMENT B 17.3 17.1 18.2 18.6 18.6 18.4   MEASUREMENT C 20.6 20.3 21.8 22.4 22 21.8   MEASUREMENT D 24.4 24.1 26 25.8 25.8 25.7   MEASUREMENT E 27 26.8 28.4 27.9 28.4 28.6   MEASUREMENT F 28.9 28.5 34.1 34.6 34.2 33.6   MEASUREMENT G 36.7 36.8 38.4 38.1 38.7 38.6   MEASUREMENT H 39.6 39.6 40.2 40.3 40.3 40.6   MEASUREMENT I 40.6 40.3 41.4 41.3 41.6 41.9    TOTAL VOLUME  [id number] 8559.96507 3046.09499 3050.27852 3068.18333 3061.83427   % DIFFERENCE -1.71020 -0.10462 -3.98214 -4.62928 -2.75099 -0.69662          7/19/2024:   - no visual swelling R breast (no visibly smaller than L) and trunk but hyperplasia/skin thickening noted                - trunk measurement (6 cm superior to R nipple): 104.3 cm (.6)                - breast measurement  (6 cm from nipple): R: 35.5 cm  (IE: 38.1 cm),. L: 37.3 cm.   - cording R abd and R axilla  - fascial tightness R UE       7/1/2024:  Cording R abdomen area  Several areas of fascial tightness RUE   Swelling R breast       6/24/2024 (Evaluation):  Sensation:  Reports occasional numbness and tingling R UE      AROM/Strength:    B shld AROM except IR R: T12   Shoulder strength: L 5/5, R 4/5  Scap strength               Rhomb 4/5 B               Mid trap R 4-/5, L 4/5  Trigger finger R 4th finger     Posture: slightly rounded shld      Edema/Tissue Observations:    - swelling R breast and trunk (hyperplasia/skin thickening noted                - trunk measurement (6 cm superior to R nipple): 105.6                -  breast measurement  (6 cm from nipple): R: 38.1 cm, (41 cm),. L: 37.8 cm   - swelling R axilla   - cording and fascial tightness R axilla down to forearm, R lateral breast         Stemmer's Sign: negative   Arm Volume Measurements:        6/24/2024     6:00 AM 4/29/2022     7:00 AM 3/23/2022     8:00 AM 2/18/2022     7:00 AM 2/4/2022     6:00 AM 1/12/2022     6:00 AM   Lymphedema Calculations   DATE MEASURED 6/24/2024 4/29/2022 3/23/2022 2/18/2022 2/4/2022 1/12/2022   LOCATION/MEASUREMENTS RUE RUE RUE RUE RUE RUE   PATIENT POSITION  supine supine supine supine supine supine   LIMB POSITION 75 degrees abd 75 degrees abd 75 degrees abd 75 degrees abd 75 degrees abd 75 degrees abd   STARTING POINT 17 cm from 3rd cuticle 17 cm from 3rd cuticle 17 cm from 3rd cuticle 17 cm from 3rd cuticle 17 cm from 3rd cuticle 17 cm from 3rd cuticle   RIGHT HAND VOLUME 19.5 across palm 19.6 across palm 19.6 across palm 19.6 across palm 19.6 across palm 19.6 across palm   LEFT HAND VOLUME 19.6 across palm 19.7 across palm 19.7 across palm 19.7 across palm 19.7 across palm 19.7 across palm   MEASUREMENT A 15.5 15.9 15.6 15.8 15.8 15.8   MEASUREMENT B 17.5 18.8 18.5 18.8 18.8 19   MEASUREMENT C 21.1 23.1 22 22.9 22 23.1   MEASUREMENT D 24.5 26.4 25.6 26.1 26.3 26.9   MEASUREMENT E 26.9 26.8 27.1 27.4 27.6 28.3   MEASUREMENT F 29.1 32.2 32.1 33 32.8 34.8   MEASUREMENT G 36.3 37.1 38 37.8 38.3 39.8   MEASUREMENT H 38.4 39.4 39.5 39.9 40.6 41.8   MEASUREMENT I 39.8 41.8 41.2 41.5 42.5 42.6    TOTAL VOLUME  9769.50564 4730.07199 2178.52792 4126.59936 5805.82096 3233.88213   DATE MEASURED 6/24/2024 4/29/2022 3/23/2022 2/18/2022 2/4/2022 1/12/2022   LOCATION/MEASUREMENTS LUE LUE LUE LUE LUE LUE   MEASUREMENT A 15.7 15.8 15.7 15.6 15.8 16   MEASUREMENT B 17.1 18.2 18.6 18.6 18.4 18.4   MEASUREMENT C 20.3 21.8 22.4 22 21.8 22.8   MEASUREMENT D 24.1 26 25.8 25.8 25.7 26.2   MEASUREMENT E 26.8 28.4 27.9 28.4 28.6 28.8   MEASUREMENT F 28.5 34.1 34.6  34.2 33.6 35.1   MEASUREMENT G 36.8 38.4 38.1 38.7 38.6 39.2   MEASUREMENT H 39.6 40.2 40.3 40.3 40.6 41.9   MEASUREMENT I 40.3 41.4 41.3 41.6 41.9 42.1    TOTAL VOLUME  2699.18204 3046.12780 3050.84256 3068.55434 3061.72837 3201.45627   % DIFFERENCE -0.29509 -3.84793 -4.06390 -2.60488 -0.14737 1.22628                          Lymphedema Life Impact Scale: Evaluation Score 6/24/2024: NA, no arm lymphedema (breast/trunk only)           Today’s Treatment and Response:   Date 6/24/2024 Date: 6/27/2024 Date: 7/1/2024 Date: 7/15/2024 Date: 7/19/2024 Date: 7/25/2024   Visit # 1/8  HMO expires 9/30/2024  Certification From: 6/24/2024  To:9/22/2024     Visit # 2/8  HMO expires 9/30/2024  Certification From: 6/24/2024  To:9/22/2024  Visit: #3/8  HMO expires 9/30/2024  Certification From: 6/24/2024  To:9/22/2024  Visit: #4/8  HMO expires 9/30/2024  Certification From: 6/24/2024  To:9/22/2024  Visit: #5/8  HMO expires 9/30/2024  Certification From: 6/24/2024  To:9/22/2024  Visit: #6/8  HMO expires 9/30/2024  Certification From: 6/24/2024  To:9/22/2024        Manual Therapy (35 minutes)     STM and stretching of cording (R axilla, RUE, R breast)     MLD: neck sequence, abd sequence, R inguinal, R A-I (supine and sidely), L axilla, A-A (ant and post), R axilla, R breast, RUE           Compression:  - pt to bring in all her garments for assessment (pt has had weight loss and may need new ones)           Manual therapy (55 minutes)      STM and stretching of cording (R axilla, RUE, R breast)     MLD: neck sequence, abd sequence, R inguinal, R A-I (supine and sidely), L axilla, A-A (ant and post), R axilla, R breast, RUE     Manual therapy (45 min)    STM and stretching of cording R abd- release noted    STM and manual stretching of RUE (areas of fascial tightness)      MLD: neck sequence, abd sequence, R inguinal, R A-I (supine and sidely), L axilla, A-A (ant and post), R axilla, R breast, RUE   Manual therapy (40  minutes)      STM and stretching of cording R abd- release noted    STM and manual stretching of RUE (areas of fascial tightness)      MLD: neck sequence, abd sequence, R inguinal, R A-I (supine and sidely), L axilla, A-A (ant and post), R axilla, R breast, RUE   Manual therapy (55 min)    Trunk measurements    STM and manual stretching of abd and R axilla (areas of cording)    STM and manual stretching of RUE/areas of fascial tightness    MLD techniques R breast, trunk, axilla, abd, and arm (after STM)    Manual therapy (60 minutes)    Arm volume measurements taken      STM and manual stretching of abd and R axilla (areas of cording)    STM and manual stretching of RUE/areas of fascial tightness    MLD techniques R breast, trunk, axilla, abd, and arm (after STM)   There Ex ( 10 minutes):  - SHld ER GTB x 10  - Shld Ext GTB x 10  - Scap retr x 10      Reviewed but will continue at home.  Additional green band issued to patient today.        There Ex (15 minutes)  -prone scap exercises x 10 reps each (T's, W's, Extension, Snow angels)  Pt lying prone over pillow and towel roll for head support   ThereAct (5 min):         Self-Care:  Management/Education: Explained Lymphedema diagnosis; informed patient of lymphedema precautions and risk reduction practices, reviewed eval finding  There Activity (30 minutes)    -reviewed step by step instructions of self MLD  -reviewed breast massage  -discussed skin stretch, gentle pressure, using flats of hands.    -massage when comfortable (shower may be a good time)  -new handouts issued.               Assessment: No change in arm volume.  Progressed to prone scap strengthening.     Goals:   Goals (discussed and planned with patient involvement):      To be met in 8 visits:  1) Decrease swelling R breast/trunk by a total of 2 cm to decrease risks of infections - MET  2) Pt to be independent with self MLD, ROM exercises, and donning/doffing compression bandages/garments to  facilitate swelling reduction and to be independent at self management once discharged  3) Increase R shoulder strength to 5/5 to improve ease of lifting/moving boxes  4) Increase B scap strength (rhomb and mid trap) to at least 4+/5 to improve ease of lifting and performing household chores      Plan:   Decrease freq to once a week. Pt to do HEP to continue w/ stretching and strengthening. Will progress shld/scap strengthening next visit.      Treatment will include: Complete Decongestive Therapy: Manual Therapy, Self-Care/Home Management Training, Therapeutic Exercise, Neuromuscular Re-education, Orthotic Management and Training        Charges: mm4. Ex1  Total Timed Treatment: 75 min  Total Treatment Time: 75 min

## 2024-07-30 ENCOUNTER — OFFICE VISIT (OUTPATIENT)
Dept: OBGYN CLINIC | Facility: CLINIC | Age: 59
End: 2024-07-30
Payer: COMMERCIAL

## 2024-07-30 VITALS
DIASTOLIC BLOOD PRESSURE: 81 MMHG | BODY MASS INDEX: 38 KG/M2 | HEART RATE: 76 BPM | WEIGHT: 200 LBS | SYSTOLIC BLOOD PRESSURE: 132 MMHG

## 2024-07-30 DIAGNOSIS — Z01.419 ENCOUNTER FOR GYNECOLOGICAL EXAMINATION WITHOUT ABNORMAL FINDING: Primary | ICD-10-CM

## 2024-07-30 PROCEDURE — 99386 PREV VISIT NEW AGE 40-64: CPT | Performed by: OBSTETRICS & GYNECOLOGY

## 2024-07-30 PROCEDURE — 3075F SYST BP GE 130 - 139MM HG: CPT | Performed by: OBSTETRICS & GYNECOLOGY

## 2024-07-30 PROCEDURE — 3079F DIAST BP 80-89 MM HG: CPT | Performed by: OBSTETRICS & GYNECOLOGY

## 2024-07-30 NOTE — PROGRESS NOTES
Tiana Abreu is a 59 year old female  Patient's last menstrual period was 2017.   Chief Complaint   Patient presents with    Gyn Exam     New patient, annual -- does not know how long last pap was. On meds for breast cancer   .  She has no complaints.  Denies postmenopausal bleeding, urinary or sexual issues.      OBSTETRICS HISTORY:     OB History    Para Term  AB Living   4 4 4 0 0 4   SAB IAB Ectopic Multiple Live Births   0 0 0 0 4      # Outcome Date GA Lbr Moise/2nd Weight Sex Type Anes PTL Lv   4 Term      Caesarean   YUSUF   3 Term      Caesarean   YUSUF   2 Term      Caesarean   YUSUF   1 Term      Caesarean   YUSUF       GYNE HISTORY:     Periods none due to menopause         No data to display                  MEDICAL HISTORY:     Past Medical History:    Breast CA ([facility])    age 56    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    pt unaware    Hypothyroidism    Morbid obesity with BMI of 40.0-44.9, adult (HCC)    KIRTI on CPAP    Postmenopausal bleeding    embx negative     Past Surgical History:   Procedure Laterality Date      ,,,2001    x 4    Colonoscopy N/A 2022    Procedure: COLONOSCOPY;  Surgeon: Jeffrey Alas MD;  Location: [facility] ENDOSCOPY    Lumpectomy right Right 2021    Radiation right Right 10/2021     OB History    Para Term  AB Living   4 4 4 0 0 4   SAB IAB Ectopic Multiple Live Births   0 0 0 0 4        SOCIAL HISTORY:     Social History     Socioeconomic History    Marital status:      Spouse name: Not on file    Number of children: Not on file    Years of education: Not on file    Highest education level: Not on file   Occupational History    Not on file   Tobacco Use    Smoking status: Former     Current packs/day: 0.00     Types: Cigarettes     Start date: 1979     Quit date: 1979     Years since quittin.6     Passive exposure: Past    Smokeless tobacco: Never    Tobacco comments:     smoked age 14 for one  week only   Vaping Use    Vaping status: Never Used   Substance and Sexual Activity    Alcohol use: Yes     Alcohol/week: 0.0 standard drinks of alcohol     Comment: Very infrequently; 1 glass of wine/4 months    Drug use: No    Sexual activity: Not on file   Other Topics Concern     Service Not Asked    Blood Transfusions Not Asked    Caffeine Concern Yes     Comment: Coffee 4 cups daily    Occupational Exposure Not Asked    Hobby Hazards Not Asked    Sleep Concern Not Asked    Stress Concern Not Asked    Weight Concern Not Asked    Special Diet Not Asked    Back Care Not Asked    Exercise Not Asked    Bike Helmet Not Asked    Seat Belt Not Asked    Self-Exams Not Asked   Social History Narrative    Work in [email or web address]     Social Determinants of Health     Financial Resource Strain: Not on file   Food Insecurity: Not on file   Transportation Needs: Not on file   Physical Activity: Not on file   Stress: Not on file   Social Connections: Not on file   Housing Stability: Not on file       FAMILY HISTORY:     Family History   Problem Relation Age of Onset    Heart Disease Father     Melanoma Father 78    Cancer Mother 71        lung & brain also    Uterine Cancer Mother     Diabetes Mother     Glaucoma Mother     Cancer Maternal Grandmother         esophageal    Cancer Maternal Grandfather         stomach    Breast Cancer Self 56       MEDICATIONS:       Current Outpatient Medications:     letrozole 2.5 MG Oral Tab, Take 1 tablet (2.5 mg total) by mouth daily., Disp: 90 tablet, Rfl: 3    levothyroxine 150 MCG Oral Tab, Take 1 tablet (150 mcg total) by mouth before breakfast., Disp: 90 tablet, Rfl: 3    Flaxseed, Linseed, (FLAX SEEDS OR), Take by mouth., Disp: , Rfl:     acetaminophen 500 MG Oral Tab, Take 1 tablet (500 mg total) by mouth every 6 (six) hours as needed for Pain or Fever., Disp: , Rfl:     Multiple Vitamins-Minerals (MULTI-VITAMIN/MINERALS) Oral Tab, Take 1 tablet by mouth daily., Disp: , Rfl:      Ascorbic Acid (VITAMIN C OR), Take by mouth daily., Disp: , Rfl:     Cholecalciferol (VITAMIN D-3 OR), Take by mouth daily., Disp: , Rfl:     ALLERGIES:     No Known Allergies      REVIEW OF SYSTEMS:     Constitutional:    denies fever / chills  Eyes:     denies blurred or double vision  Cardiovascular:  denies chest pain or palpitations  Respiratory:    denies shortness of breath  Gastrointestinal:  denies severe abdominal pain, frequent diarrhea or constipation, nausea / vomiting  Genitourinary:    denies dysuria, bothersome incontinence  Skin/Breast:   denies any breast pain, lumps, or discharge  Neurological:    denies frequent severe headaches  Psychiatric:   denies depression or anxiety, thoughts of harming self or others  Heme/Lymph:    denies easy bruising or bleeding    PHYSICAL EXAM:   Blood pressure 132/81, pulse 76, weight 200 lb (90.7 kg), last menstrual period 04/22/2017, not currently breastfeeding.  Constitutional:  well developed, well nourished  Head/Face:  normocephalic  Neck/Thyroid:  thyroid symmetric, no thyromegaly, no nodules, no adenopathy  Lymphatic: no abnormal supraclavicular or axillary adenopathy is noted  Breast:   normal without palpable masses, tenderness, asymmetry, nipple discharge, nipple retraction or skin changes (+) right breast scar  Respiratory:   nonlabored breathing  Cardiovascular: regular rate and rhythm  Abdomen:   soft, nontender, nondistended, no masses  Skin/Hair: no unusual rashes or bruises  Extremities:  no edema, no cyanosis  Psychiatric:   Oriented to time, place, person and situation. Appropriate mood and affect    Pelvic Exam:  External Genitalia:  normal appearance, hair distribution, and no lesions  Urethral Meatus:   normal in size, location, without lesions and prolapse  Bladder:    no fullness, masses or tenderness  Vagina:    normal appearance without lesions, no abnormal discharge  Cervix:    normal without tenderness on motion  Uterus:    normal in  size, contour, position, mobility, without tenderness  Adnexa:   normal without masses or tenderness  Perineum:   normal  Anus:    no hemorroids    ASSESSMENT & PLAN:     Tiana was seen today for gyn exam.    Diagnoses and all orders for this visit:    Encounter for gynecological examination without abnormal finding          SUMMARY:    Pap: Next cotest today -- will need to repeat in 5 years to stop per ASCCP guidelines.    Mammogram: done recently    BCM: Postmenopausal    STD screening: declines    Colon cancer screening: UTD    Misc: Calcium needs reviewed (1500 mg diet + supplement). Weight bearing exercise encouraged. Call if any VB (if perimenopausal, reviewed abn VB patterns)    HM updated    Depression screen:   Depression Screening (PHQ-2/PHQ-9): Over the LAST 2 WEEKS   Little interest or pleasure in doing things (over the last two weeks)?: Not at all    Feeling down, depressed, or hopeless (over the last two weeks)?: Not at all    PHQ-2 SCORE: 0          FOLLOW-UP     Return in about 1 year (around 7/30/2025) for annual gyne exam.    Note to patient and family:  The 21st Century Cures Act makes medical notes available to patients in the interest of transparency.  However, please be advised that this is a medical document.  It is intended as a peer to peer communication.  It is written in medical language and may contain abbreviations or verbiage that are technical and unfamiliar.  It may appear blunt or direct.  Medical documents are intended to carry relevant information, facts as evident, and the clinical opinion of the practitioner.

## 2024-07-31 ENCOUNTER — OFFICE VISIT (OUTPATIENT)
Dept: PHYSICAL THERAPY | Facility: HOSPITAL | Age: 59
End: 2024-07-31
Attending: INTERNAL MEDICINE
Payer: COMMERCIAL

## 2024-07-31 LAB — HPV E6+E7 MRNA CVX QL NAA+PROBE: NEGATIVE

## 2024-07-31 PROCEDURE — 97530 THERAPEUTIC ACTIVITIES: CPT

## 2024-07-31 PROCEDURE — 97140 MANUAL THERAPY 1/> REGIONS: CPT

## 2024-07-31 NOTE — PROGRESS NOTES
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)  I89.0 lymphedema      Date of onset: 6/24/2024 Evaluation Date: 6/24/2024     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    pt unaware    Hypothyroidism    Morbid obesity with BMI of 40.0-44.9, adult (HCC)    KIRTI on CPAP    Postmenopausal bleeding    embx negative         Pain:  3-7/10 R 4th finger (intermittent with bending finger)    Subjective: Pt expressed that she continues to have days that her tissue feels more swollen than other days.    Objective:    7/25/2024 7/25/2024     6:00 AM 6/24/2024     6:00 AM 4/29/2022     7:00 AM 3/23/2022     8:00 AM 2/18/2022     7:00 AM 2/4/2022     6:00 AM   Lymphedema Calculations   DATE MEASURED 7/25/2024 6/24/2024 4/29/2022 3/23/2022 2/18/2022 2/4/2022   LOCATION/MEASUREMENTS RUE RUE RUE [place] [place]   PATIENT POSITION  supine supine supine supine supine supine   LIMB POSITION 75 degrees abd 75 degrees abd 75 degrees abd 75 degrees abd 75 degrees abd 75 degrees abd   STARTING POINT 17 cm from 3rd cuticle 17 cm from 3rd cuticle 17 cm from 3rd cuticle 17 cm from 3rd cuticle 17 cm from 3rd cuticle 17 cm from 3rd cuticle   RIGHT HAND VOLUME 19.5 across palm 19.5 across palm 19.6 across palm 19.6 across palm 19.6 across palm 19.6 across palm   LEFT HAND VOLUME 19.6 across palm 19.6 across palm 19.7 across palm 19.7 across palm 19.7 across palm 19.7 across palm   MEASUREMENT A 15.5 15.5 15.9 15.6 15.8 15.8   MEASUREMENT B 17.7 17.5 18.8 18.5 18.8 18.8   MEASUREMENT C 21.3 21.1 23.1 22 22.9 22   MEASUREMENT D 24.5 24.5 26.4 25.6 26.1 26.3   MEASUREMENT E 26.8 26.9 26.8 27.1 27.4 27.6   MEASUREMENT F 29.4 29.1 32.2 32.1 33 32.8   MEASUREMENT G 36.4 36.3 37.1 38  37.8 38.3   MEASUREMENT H 38.1 38.4 39.4 39.5 39.9 40.6   MEASUREMENT I 39.5 39.8 41.8 41.2 41.5 42.5    TOTAL VOLUME  2682.12307 2682.00653 2941.52976 2921.04200 2996.67881 3038.31843   DATE MEASURED 6/24/2024 6/24/2024 4/29/2022 3/23/2022 2/18/2022 2/4/2022   LOCATION/MEASUREMENTS LUE LUE LUE LUE LUE LUE   MEASUREMENT A 15.7 15.7 15.8 15.7 15.6 15.8   MEASUREMENT B 17.3 17.1 18.2 18.6 18.6 18.4   MEASUREMENT C 20.6 20.3 21.8 22.4 22 21.8   MEASUREMENT D 24.4 24.1 26 25.8 25.8 25.7   MEASUREMENT E 27 26.8 28.4 27.9 28.4 28.6   MEASUREMENT F 28.9 28.5 34.1 34.6 34.2 33.6   MEASUREMENT G 36.7 36.8 38.4 38.1 38.7 38.6   MEASUREMENT H 39.6 39.6 40.2 40.3 40.3 40.6   MEASUREMENT I 40.6 40.3 41.4 41.3 41.6 41.9    TOTAL VOLUME  2729.91739 2699.29881 3046.54824 3050.66642 3068.43063 3061.75421   % DIFFERENCE -1.63010 -0.25533 -3.34618 -4.13810 -2.90483 -0.18700          7/19/2024:   - no visual swelling R breast (no visibly smaller than L) and trunk but hyperplasia/skin thickening noted                - trunk measurement (6 cm superior to R nipple): 104.3 cm (.6)                - breast measurement  (6 cm from nipple): R: 35.5 cm  (IE: 38.1 cm),. L: 37.3 cm.   - cording R abd and R axilla  - fascial tightness R UE       7/1/2024:  Cording R abdomen area  Several areas of fascial tightness RUE   Swelling R breast       6/24/2024 (Evaluation):  Sensation:  Reports occasional numbness and tingling R UE      AROM/Strength:    B shld AROM except IR R: T12   Shoulder strength: L 5/5, R 4/5  Scap strength               Rhomb 4/5 B               Mid trap R 4-/5, L 4/5  Trigger finger R 4th finger     Posture: slightly rounded shld      Edema/Tissue Observations:    - swelling R breast and trunk (hyperplasia/skin thickening noted                - trunk measurement (6 cm superior to R nipple): 105.6                - breast measurement  (6 cm from nipple): R: 38.1 cm, (41 cm),. L: 37.8 cm   - swelling R axilla   - cording and  fascial tightness R axilla down to forearm, R lateral breast         Stemmer's Sign: negative   Arm Volume Measurements:        6/24/2024     6:00 AM 4/29/2022     7:00 AM 3/23/2022     8:00 AM 2/18/2022     7:00 AM 2/4/2022     6:00 AM 1/12/2022     6:00 AM   Lymphedema Calculations   DATE MEASURED 6/24/2024 4/29/2022 3/23/2022 2/18/2022 2/4/2022 1/12/2022   LOCATION/MEASUREMENTS RUE RUE RUE RUE RUE RUE   PATIENT POSITION  supine supine supine supine supine supine   LIMB POSITION 75 degrees abd 75 degrees abd 75 degrees abd 75 degrees abd 75 degrees abd 75 degrees abd   STARTING POINT 17 cm from 3rd cuticle 17 cm from 3rd cuticle 17 cm from 3rd cuticle 17 cm from 3rd cuticle 17 cm from 3rd cuticle 17 cm from 3rd cuticle   RIGHT HAND VOLUME 19.5 across palm 19.6 across palm 19.6 across palm 19.6 across palm 19.6 across palm 19.6 across palm   LEFT HAND VOLUME 19.6 across palm 19.7 across palm 19.7 across palm 19.7 across palm 19.7 across palm 19.7 across palm   MEASUREMENT A 15.5 15.9 15.6 15.8 15.8 15.8   MEASUREMENT B 17.5 18.8 18.5 18.8 18.8 19   MEASUREMENT C 21.1 23.1 22 22.9 22 23.1   MEASUREMENT D 24.5 26.4 25.6 26.1 26.3 26.9   MEASUREMENT E 26.9 26.8 27.1 27.4 27.6 28.3   MEASUREMENT F 29.1 32.2 32.1 33 32.8 34.8   MEASUREMENT G 36.3 37.1 38 37.8 38.3 39.8   MEASUREMENT H 38.4 39.4 39.5 39.9 40.6 41.8   MEASUREMENT I 39.8 41.8 41.2 41.5 42.5 42.6    TOTAL VOLUME  2682.66260 2941.11527 2921.35169 2996.94239 3038.86557 3233.74390   DATE MEASURED 6/24/2024 4/29/2022 3/23/2022 2/18/2022 2/4/2022 1/12/2022   LOCATION/MEASUREMENTS LUE LUE LUE LUE LUE LUE   MEASUREMENT A 15.7 15.8 15.7 15.6 15.8 16   MEASUREMENT B 17.1 18.2 18.6 18.6 18.4 18.4   MEASUREMENT C 20.3 21.8 22.4 22 21.8 22.8   MEASUREMENT D 24.1 26 25.8 25.8 25.7 26.2   MEASUREMENT E 26.8 28.4 27.9 28.4 28.6 28.8   MEASUREMENT F 28.5 34.1 34.6 34.2 33.6 35.1   MEASUREMENT G 36.8 38.4 38.1 38.7 38.6 39.2   MEASUREMENT H 39.6 40.2 40.3 40.3 40.6 41.9    MEASUREMENT I 40.3 41.4 41.3 41.6 41.9 42.1    TOTAL VOLUME  7279.84082 3046.80155 3050.46741 3068.70640 3061.59866 3201.09708   % DIFFERENCE -0.61191 -3.13374 -4.69678 -2.48585 -0.79913 1.31159                          Lymphedema Life Impact Scale: Evaluation Score 6/24/2024: NA, no arm lymphedema (breast/trunk only)           Today’s Treatment and Response:   Date 6/24/2024 Date: 6/27/2024 Date: 7/1/2024 Date: 7/15/2024 Date: 7/19/2024 Date: 7/25/2024 Date: 7/31/2024   Visit # 1/8  HMO expires 9/30/2024  Certification From: 6/24/2024  To:9/22/2024     Visit # 2/8  HMO expires 9/30/2024  Certification From: 6/24/2024  To:9/22/2024  Visit: #3/8  HMO expires 9/30/2024  Certification From: 6/24/2024  To:9/22/2024  Visit: #4/8  HMO expires 9/30/2024  Certification From: 6/24/2024  To:9/22/2024  Visit: #5/8  HMO expires 9/30/2024  Certification From: 6/24/2024  To:9/22/2024  Visit: #6/8  HMO expires 9/30/2024  Certification From: 6/24/2024  To:9/22/2024      Visit: #7/8  HMO expires 9/30/2024  Certification from: 6/24/2024 to 9/22/2204   Manual Therapy (35 minutes)     STM and stretching of cording (R axilla, RUE, R breast)     MLD: neck sequence, abd sequence, R inguinal, R A-I (supine and sidely), L axilla, A-A (ant and post), R axilla, R breast, RUE           Compression:  - pt to bring in all her garments for assessment (pt has had weight loss and may need new ones)           Manual therapy (55 minutes)      STM and stretching of cording (R axilla, RUE, R breast)     MLD: neck sequence, abd sequence, R inguinal, R A-I (supine and sidely), L axilla, A-A (ant and post), R axilla, R breast, RUE     Manual therapy (45 min)    STM and stretching of cording R abd- release noted    STM and manual stretching of RUE (areas of fascial tightness)      MLD: neck sequence, abd sequence, R inguinal, R A-I (supine and sidely), L axilla, A-A (ant and post), R axilla, R breast, RUE   Manual therapy (40 minutes)      STM and  stretching of cording R abd- release noted    STM and manual stretching of RUE (areas of fascial tightness)      MLD: neck sequence, abd sequence, R inguinal, R A-I (supine and sidely), L axilla, A-A (ant and post), R axilla, R breast, RUE   Manual therapy (55 min)    Trunk measurements    STM and manual stretching of abd and R axilla (areas of cording)    STM and manual stretching of RUE/areas of fascial tightness    MLD techniques R breast, trunk, axilla, abd, and arm (after STM)    Manual therapy (60 minutes)    Arm volume measurements taken      STM and manual stretching of abd and R axilla (areas of cording)    STM and manual stretching of RUE/areas of fascial tightness    MLD techniques R breast, trunk, axilla, abd, and arm (after STM) Manual therapy (43 minutes)      STM and manual stretching of abd and R axilla (areas of cording)    STM and manual stretching of RUE/areas of fascial tightness    MLD techniques R breast, trunk, axilla, abd, and arm (after STM)  *reviewed self massage techniques and discussed importance of daily massage even when symptoms are improved to facilitate lymphatics.  *discussed good skin care importance of using lotion daily.   There Ex ( 10 minutes):  - SHld ER GTB x 10  - Shld Ext GTB x 10  - Scap retr x 10      Reviewed but will continue at home.  Additional green band issued to patient today.        There Ex (15 minutes)  -prone scap exercises x 10 reps each (T's, W's, Extension, Snow angels)  Pt lying prone over pillow and towel roll for head support There Ex (5 minutes)    -reviewed prone scap strength exercises.  Demo of \"snow angels\" arm position   ThereAct (5 min):         Self-Care:  Management/Education: Explained Lymphedema diagnosis; informed patient of lymphedema precautions and risk reduction practices, reviewed eval finding  There Activity (30 minutes)    -reviewed step by step instructions of self MLD  -reviewed breast massage  -discussed skin stretch, gentle  pressure, using flats of hands.    -massage when comfortable (shower may be a good time)  -new handouts issued.       There Act (8 minutes)    -discussed/reviewed with patient that days that are more hot and humid, diet, lack of exercise can all contribute to feeling more \"swollen\".  That it will be an important routine to get in habit of daily self massage of breast tissue and trunk/axilla, daily exercises and good skin care.         Assessment: Pt to continue with self care over the next 3 weeks.  Will reassess at end of August.     Goals:   Goals (discussed and planned with patient involvement):      To be met in 8 visits:  1) Decrease swelling R breast/trunk by a total of 2 cm to decrease risks of infections - MET  2) Pt to be independent with self MLD, ROM exercises, and donning/doffing compression bandages/garments to facilitate swelling reduction and to be independent at self management once discharged  3) Increase R shoulder strength to 5/5 to improve ease of lifting/moving boxes  4) Increase B scap strength (rhomb and mid trap) to at least 4+/5 to improve ease of lifting and performing household chores      Plan:   Cont with self management.  Pt to contact clinician if she has any concerns or questions.  Will follow up end of August.  Decrease freq to once a week. Pt to do HEP to continue w/ stretching and strengthening. Will progress shld/scap strengthening next visit.      Treatment will include: Complete Decongestive Therapy: Manual Therapy, Self-Care/Home Management Training, Therapeutic Exercise, Neuromuscular Re-education, Orthotic Management and Training        Charges: mm3, There act 1 Total Timed Treatment: 56 min  Total Treatment Time: 56 min

## 2024-08-20 ENCOUNTER — OFFICE VISIT (OUTPATIENT)
Dept: FAMILY MEDICINE CLINIC | Facility: CLINIC | Age: 59
End: 2024-08-20
Payer: COMMERCIAL

## 2024-08-20 VITALS
TEMPERATURE: 98 F | BODY MASS INDEX: 38 KG/M2 | DIASTOLIC BLOOD PRESSURE: 68 MMHG | SYSTOLIC BLOOD PRESSURE: 142 MMHG | HEART RATE: 86 BPM | WEIGHT: 202 LBS | OXYGEN SATURATION: 98 % | RESPIRATION RATE: 16 BRPM

## 2024-08-20 DIAGNOSIS — S70.362A INSECT BITE OF LEFT THIGH, INITIAL ENCOUNTER: Primary | ICD-10-CM
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
## 2024-08-20 PROCEDURE — 3077F SYST BP >= 140 MM HG: CPT | Performed by: NURSE PRACTITIONER

## 2024-08-20 PROCEDURE — 99213 OFFICE O/P EST LOW 20 MIN: CPT | Performed by: NURSE PRACTITIONER

## 2024-08-20 PROCEDURE — 3078F DIAST BP <80 MM HG: CPT | Performed by: NURSE PRACTITIONER

## 2024-08-20 RX ORDER — TRIAMCINOLONE ACETONIDE 1 MG/G
1 OINTMENT TOPICAL 2 TIMES DAILY
Qty: 15 G | Refills: 0 | Status: SHIPPED | OUTPATIENT
Start: 2024-08-20 | End: 2024-08-27

## 2024-08-21 NOTE — PROGRESS NOTES
Subjective:   Patient ID: Tiana Abreu is a 59 year old female.    Patient is a 59 year old female who presents today with complaints of an itchy bite to her left upper thigh x 3 days. Noticed it after she was working in the yard. It is red, itchy and swollen. Denies pus drainage, hx MRSA, fevers, body aches, chills, n/v/d. No recent travel. Nobody at home with bites. Did not remove any attached insects. Tolerating PO well at home. Attempted treatment prior to arrival = cortisone cream without much relief..        History/Other:   Review of Systems   Constitutional:  Negative for appetite change, chills and fever.   Gastrointestinal:  Negative for abdominal pain, diarrhea, nausea and vomiting.   Musculoskeletal:  Negative for myalgias.   Skin:  Positive for color change and wound.     Current Outpatient Medications   Medication Sig Dispense Refill    triamcinolone 0.1 % External Ointment Apply 1 Application topically 2 (two) times daily for 7 days. 15 g 0    letrozole 2.5 MG Oral Tab Take 1 tablet (2.5 mg total) by mouth daily. 90 tablet 3    levothyroxine 150 MCG Oral Tab Take 1 tablet (150 mcg total) by mouth before breakfast. 90 tablet 3    Flaxseed, Linseed, (FLAX SEEDS OR) Take by mouth.      acetaminophen 500 MG Oral Tab Take 1 tablet (500 mg total) by mouth every 6 (six) hours as needed for Pain or Fever.      Multiple Vitamins-Minerals (MULTI-VITAMIN/MINERALS) Oral Tab Take 1 tablet by mouth daily.      Ascorbic Acid (VITAMIN C OR) Take by mouth daily.      Cholecalciferol (VITAMIN D-3 OR) Take by mouth daily.       Allergies:No Known Allergies    /68   Pulse 86   Temp 98.4 °F (36.9 °C) (Oral)   Resp 16   Wt 202 lb (91.6 kg)   LMP 04/22/2017   SpO2 98%   BMI 38.17 kg/m²       Objective:   Physical Exam  Vitals reviewed.   Constitutional:       General: She is awake. She is not in acute distress.     Appearance: Normal appearance. She is well-developed and well-groomed. She is not  ill-appearing, toxic-appearing or diaphoretic.   HENT:      Head: Normocephalic and atraumatic.   Cardiovascular:      Rate and Rhythm: Normal rate and regular rhythm.   Pulmonary:      Effort: Pulmonary effort is normal. No respiratory distress.      Breath sounds: Normal breath sounds and air entry. No decreased breath sounds, wheezing, rhonchi or rales.   Skin:     General: Skin is warm and dry.      Findings: Rash present. Rash is macular.          Neurological:      Mental Status: She is alert and oriented to person, place, and time.   Psychiatric:         Behavior: Behavior is cooperative.         Assessment & Plan:   1. Insect bite of left thigh, initial encounter        No orders of the defined types were placed in this encounter.      Meds This Visit:  Requested Prescriptions     Signed Prescriptions Disp Refills    triamcinolone 0.1 % External Ointment 15 g 0     Sig: Apply 1 Application topically 2 (two) times daily for 7 days.     Reassuring physical exam findings. Vitals WNL. No sign of bacterial etiology, RDS or dehydration at this time.  Triamcinolone as prescribed. To  OTC antihistamine.  Supportive care and return to care measures reviewed.  Patient v/u and is comfortable with this plan.    Patient Instructions   Use Triamcinolone ointment as prescribed  Take 24 hour non drowsy antihistamine (Claritin/Zyrtec) to help reduce itching  Avoid scratching/itching affected areas  Return to care if you develop signs of infection: increased/streaking redness, warmth, pain, pus drainage, fevers

## 2024-08-21 NOTE — PATIENT INSTRUCTIONS
Use Triamcinolone ointment as prescribed  Take 24 hour non drowsy antihistamine (Claritin/Zyrtec) to help reduce itching  Avoid scratching/itching affected areas  Return to care if you develop signs of infection: increased/streaking redness, warmth, pain, pus drainage, fevers

## 2024-08-22 ENCOUNTER — TELEPHONE (OUTPATIENT)
Dept: PHYSICAL THERAPY | Facility: HOSPITAL | Age: 59
End: 2024-08-22

## 2024-08-22 ENCOUNTER — OFFICE VISIT (OUTPATIENT)
Dept: PHYSICAL THERAPY | Facility: HOSPITAL | Age: 59
End: 2024-08-22
Attending: INTERNAL MEDICINE
Payer: COMMERCIAL

## 2024-08-22 PROCEDURE — 97530 THERAPEUTIC ACTIVITIES: CPT | Performed by: PHYSICAL THERAPIST

## 2024-08-22 PROCEDURE — 97140 MANUAL THERAPY 1/> REGIONS: CPT | Performed by: PHYSICAL THERAPIST

## 2024-08-22 NOTE — PROGRESS NOTES
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       Assessment: Pt w/ good reduction of swelling, improved strength, and improved tissue mobility (no longer w/ cording). Pt demonstrated she was able to self manage her symptoms over the last 3 weeks. Due to the chronic nature of her lymphedema R breast pt understands importance of compression and self MLD.   Plan: Discharge from PT         Lymphedema Life Impact Scale: Score 8/22/2024: NA- no arm lymphedema        Patient/Family/Caregiver was advised of these findings, precautions, and treatment options and has agreed to actively participate in planning and for this course of care.    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    pt unaware    Hypothyroidism    Morbid obesity with BMI of 40.0-44.9, adult (HCC)    KIRTI on CPAP    Postmenopausal bleeding    embx negative         Pain: No pain RUE/trunk/breast, but still has a lot of pain R finger (trigger finger)    Subjective: \"it's better, it doesn't feel tight\"    Objective:    8/22/2024:  B shld AROM WFL  B shld strength 5/5  Scap strength:  Rhomb 4+/5 B    Mid trap R 4+/5, L 4/5   No visual swelling R breast (no visibly smaller than L)  but still w/ slight hyperplasia/skin thickening noted R lateral/inferior breast               - trunk measurement (6 cm superior to R nipple): 104.4 cm (.6)                - breast measurement  (6 cm from nipple): R: 35.5 cm  (IE: 38.1 cm),. L: 37.3 cm.   No visual signs of swelling of R UE      8/22/2024     8:00 AM 7/25/2024     6:00 AM 6/24/2024     6:00 AM  4/29/2022     7:00 AM 3/23/2022     8:00 AM 2/18/2022     7:00 AM   Lymphedema Calculations   DATE MEASURED 8/22/2024 7/25/2024 6/24/2024 4/29/2022 3/23/2022 2/18/2022   LOCATION/MEASUREMENTS RUE RUE RUE RUE RUE RUE   PATIENT POSITION  supine supine supine supine supine supine   LIMB POSITION 75 degrees abd 75 degrees abd 75 degrees abd 75 degrees abd 75 degrees abd 75 degrees abd   STARTING POINT 17 cm from 3rd cuticle 17 cm from 3rd cuticle 17 cm from 3rd cuticle 17 cm from 3rd cuticle 17 cm from 3rd cuticle 17 cm from 3rd cuticle   RIGHT HAND VOLUME 19.5 across palm 19.5 across palm 19.5 across palm 19.6 across palm 19.6 across palm 19.6 across palm   LEFT HAND VOLUME 19.6 across palm 19.6 across palm 19.6 across palm 19.7 across palm 19.7 across palm 19.7 across palm   MEASUREMENT A 15.3 15.5 15.5 15.9 15.6 15.8   MEASUREMENT B 17.1 17.7 17.5 18.8 18.5 18.8   MEASUREMENT C 20.3 21.3 21.1 23.1 22 22.9   MEASUREMENT D 24 24.5 24.5 26.4 25.6 26.1   MEASUREMENT E 26.9 26.8 26.9 26.8 27.1 27.4   MEASUREMENT F 29.3 29.4 29.1 32.2 32.1 33   MEASUREMENT G 35.9 36.4 36.3 37.1 38 37.8   MEASUREMENT H 38.5 38.1 38.4 39.4 39.5 39.9   MEASUREMENT I 39.8 39.5 39.8 41.8 41.2 41.5    TOTAL VOLUME  2648.67652 2682.88517 2682.63205 2941.12832 2921.72226 2996.90968   DATE MEASURED 8/22/2024 6/24/2024 6/24/2024 4/29/2022 3/23/2022 2/18/2022   LOCATION/MEASUREMENTS LUE LUE LUE LUE LUE LUE   MEASUREMENT A 15.5 15.7 15.7 15.8 15.7 15.6   MEASUREMENT B 17.1 17.3 17.1 18.2 18.6 18.6   MEASUREMENT C 20 20.6 20.3 21.8 22.4 22   MEASUREMENT D 23.9 24.4 24.1 26 25.8 25.8   MEASUREMENT E 26.8 27 26.8 28.4 27.9 28.4   MEASUREMENT F 29 28.9 28.5 34.1 34.6 34.2   MEASUREMENT G 36.3 36.7 36.8 38.4 38.1 38.7   MEASUREMENT H 38.3 39.6 39.6 40.2 40.3 40.3   MEASUREMENT I 40.3 40.6 40.3 41.4 41.3 41.6    TOTAL VOLUME  2646.77613 3555.02562 4011.82095 0919.80319 3050.45589 3068.08562   % DIFFERENCE 0.13026 -1.96028 -0.64825 -3.81946 -4.02335 -2.04769                7/25/2024 7/25/2024     6:00 AM 6/24/2024     6:00 AM 4/29/2022     7:00 AM 3/23/2022     8:00 AM 2/18/2022     7:00 AM 2/4/2022     6:00 AM   Lymphedema Calculations   DATE MEASURED 7/25/2024 6/24/2024 4/29/2022 3/23/2022 2/18/2022 2/4/2022   LOCATION/MEASUREMENTS RUE RUE RUE RUE RUE RUE   PATIENT POSITION  supine supine supine supine supine supine   LIMB POSITION 75 degrees abd 75 degrees abd 75 degrees abd 75 degrees abd 75 degrees abd 75 degrees abd   STARTING POINT 17 cm from 3rd cuticle 17 cm from 3rd cuticle 17 cm from 3rd cuticle 17 cm from 3rd cuticle 17 cm from 3rd cuticle 17 cm from 3rd cuticle   RIGHT HAND VOLUME 19.5 across palm 19.5 across palm 19.6 across palm 19.6 across palm 19.6 across palm 19.6 across palm   LEFT HAND VOLUME 19.6 across palm 19.6 across palm 19.7 across palm 19.7 across palm 19.7 across palm 19.7 across palm   MEASUREMENT A 15.5 15.5 15.9 15.6 15.8 15.8   MEASUREMENT B 17.7 17.5 18.8 18.5 18.8 18.8   MEASUREMENT C 21.3 21.1 23.1 22 22.9 22   MEASUREMENT D 24.5 24.5 26.4 25.6 26.1 26.3   MEASUREMENT E 26.8 26.9 26.8 27.1 27.4 27.6   MEASUREMENT F 29.4 29.1 32.2 32.1 33 32.8   MEASUREMENT G 36.4 36.3 37.1 38 37.8 38.3   MEASUREMENT H 38.1 38.4 39.4 39.5 39.9 40.6   MEASUREMENT I 39.5 39.8 41.8 41.2 41.5 42.5    TOTAL VOLUME  2682.57795 2682.60254 2941.47060 2921.00325 2996.18823 3038.38635   DATE MEASURED 6/24/2024 6/24/2024 4/29/2022 3/23/2022 2/18/2022 2/4/2022   LOCATION/MEASUREMENTS LUE LUE LUE LUE LUE LUE   MEASUREMENT A 15.7 15.7 15.8 15.7 15.6 15.8   MEASUREMENT B 17.3 17.1 18.2 18.6 18.6 18.4   MEASUREMENT C 20.6 20.3 21.8 22.4 22 21.8   MEASUREMENT D 24.4 24.1 26 25.8 25.8 25.7   MEASUREMENT E 27 26.8 28.4 27.9 28.4 28.6   MEASUREMENT F 28.9 28.5 34.1 34.6 34.2 33.6   MEASUREMENT G 36.7 36.8 38.4 38.1 38.7 38.6   MEASUREMENT H 39.6 39.6 40.2 40.3 40.3 40.6   MEASUREMENT I 40.6 40.3 41.4 41.3 41.6 41.9    TOTAL VOLUME  7943.34139 4264.72843 9122.08470  3050.64422 3068.53111 3061.48655   % DIFFERENCE -1.13076 -0.76075 -3.95075 -4.21342 -2.53286 -0.58882          7/19/2024:   - no visual swelling R breast (no visibly smaller than L) and trunk but hyperplasia/skin thickening noted                - trunk measurement (6 cm superior to R nipple): 104.3 cm (.6)                - breast measurement  (6 cm from nipple): R: 35.5 cm  (IE: 38.1 cm),. L: 37.3 cm.   - cording R abd and R axilla  - fascial tightness R UE       7/1/2024:  Cording R abdomen area  Several areas of fascial tightness RUE   Swelling R breast       6/24/2024 (Evaluation):  Sensation:  Reports occasional numbness and tingling R UE      AROM/Strength:    B shld AROM except IR R: T12   Shoulder strength: L 5/5, R 4/5  Scap strength               Rhomb 4/5 B               Mid trap R 4-/5, L 4/5  Trigger finger R 4th finger     Posture: slightly rounded shld      Edema/Tissue Observations:    - swelling R breast and trunk (hyperplasia/skin thickening noted                - trunk measurement (6 cm superior to R nipple): 105.6                - breast measurement  (6 cm from nipple): R: 38.1 cm, (41 cm),. L: 37.8 cm   - swelling R axilla   - cording and fascial tightness R axilla down to forearm, R lateral breast         Stemmer's Sign: negative   Arm Volume Measurements:        6/24/2024     6:00 AM 4/29/2022     7:00 AM 3/23/2022     8:00 AM 2/18/2022     7:00 AM 2/4/2022     6:00 AM 1/12/2022     6:00 AM   Lymphedema Calculations   DATE MEASURED 6/24/2024 4/29/2022 3/23/2022 2/18/2022 2/4/2022 1/12/2022   LOCATION/MEASUREMENTS RUE RUE RUE RUE RUE RUE   PATIENT POSITION  supine supine supine supine supine supine   LIMB POSITION 75 degrees abd 75 degrees abd 75 degrees abd 75 degrees abd 75 degrees abd 75 degrees abd   STARTING POINT 17 cm from 3rd cuticle 17 cm from 3rd cuticle 17 cm from 3rd cuticle 17 cm from 3rd cuticle 17 cm from 3rd cuticle 17 cm from 3rd cuticle   RIGHT HAND VOLUME 19.5 across palm  19.6 across palm 19.6 across palm 19.6 across palm 19.6 across palm 19.6 across palm   LEFT HAND VOLUME 19.6 across palm 19.7 across palm 19.7 across palm 19.7 across palm 19.7 across palm 19.7 across palm   MEASUREMENT A 15.5 15.9 15.6 15.8 15.8 15.8   MEASUREMENT B 17.5 18.8 18.5 18.8 18.8 19   MEASUREMENT C 21.1 23.1 22 22.9 22 23.1   MEASUREMENT D 24.5 26.4 25.6 26.1 26.3 26.9   MEASUREMENT E 26.9 26.8 27.1 27.4 27.6 28.3   MEASUREMENT F 29.1 32.2 32.1 33 32.8 34.8   MEASUREMENT G 36.3 37.1 38 37.8 38.3 39.8   MEASUREMENT H 38.4 39.4 39.5 39.9 40.6 41.8   MEASUREMENT I 39.8 41.8 41.2 41.5 42.5 42.6    TOTAL VOLUME  3929.90904 2941.43537 2921.62630 2996.33186 3038.45978 3233.68254   DATE MEASURED 6/24/2024 4/29/2022 3/23/2022 2/18/2022 2/4/2022 1/12/2022   LOCATION/MEASUREMENTS LUE LUE LUE LUE LUE LUE   MEASUREMENT A 15.7 15.8 15.7 15.6 15.8 16   MEASUREMENT B 17.1 18.2 18.6 18.6 18.4 18.4   MEASUREMENT C 20.3 21.8 22.4 22 21.8 22.8   MEASUREMENT D 24.1 26 25.8 25.8 25.7 26.2   MEASUREMENT E 26.8 28.4 27.9 28.4 28.6 28.8   MEASUREMENT F 28.5 34.1 34.6 34.2 33.6 35.1   MEASUREMENT G 36.8 38.4 38.1 38.7 38.6 39.2   MEASUREMENT H 39.6 40.2 40.3 40.3 40.6 41.9   MEASUREMENT I 40.3 41.4 41.3 41.6 41.9 42.1    TOTAL VOLUME  0390.94319 3199.34306 9152.03878 2239.02308 3061.50449 3201.51539   % DIFFERENCE -0.64415 -3.88729 -4.77200 -2.69343 -0.51504 1.50307                          Lymphedema Life Impact Scale: Evaluation Score 6/24/2024: NA, no arm lymphedema (breast/trunk only)           Today’s Treatment and Response:   Date 6/24/2024 Date: 6/27/2024 Date: 7/1/2024 Date: 7/15/2024 Date: 7/19/2024 Date: 7/25/2024 Date: 7/31/2024 8/22/2024   Visit # 1/8  HMO expires 9/30/2024  Certification From: 6/24/2024  To:9/22/2024     Visit # 2/8  HMO expires 9/30/2024  Certification From: 6/24/2024  To:9/22/2024  Visit: #3/8  HMO expires 9/30/2024  Certification From: 6/24/2024  To:9/22/2024  Visit: #4/8  HMO expires  9/30/2024  Certification From: 6/24/2024  To:9/22/2024  Visit: #5/8  HMO expires 9/30/2024  Certification From: 6/24/2024  To:9/22/2024  Visit: #6/8  HMO expires 9/30/2024  Certification From: 6/24/2024  To:9/22/2024      Visit: #7/8  HMO expires 9/30/2024  Certification from: 6/24/2024 to 9/22/2204 Visit: #8/8  HMO expires 9/30/2024  Certification from: 6/24/2024 to 9/22/2204   Manual Therapy (35 minutes)     STM and stretching of cording (R axilla, RUE, R breast)     MLD: neck sequence, abd sequence, R inguinal, R A-I (supine and sidely), L axilla, A-A (ant and post), R axilla, R breast, RUE           Compression:  - pt to bring in all her garments for assessment (pt has had weight loss and may need new ones)           Manual therapy (55 minutes)      STM and stretching of cording (R axilla, RUE, R breast)     MLD: neck sequence, abd sequence, R inguinal, R A-I (supine and sidely), L axilla, A-A (ant and post), R axilla, R breast, RUE     Manual therapy (45 min)    STM and stretching of cording R abd- release noted    STM and manual stretching of RUE (areas of fascial tightness)      MLD: neck sequence, abd sequence, R inguinal, R A-I (supine and sidely), L axilla, A-A (ant and post), R axilla, R breast, RUE   Manual therapy (40 minutes)      STM and stretching of cording R abd- release noted    STM and manual stretching of RUE (areas of fascial tightness)      MLD: neck sequence, abd sequence, R inguinal, R A-I (supine and sidely), L axilla, A-A (ant and post), R axilla, R breast, RUE   Manual therapy (55 min)    Trunk measurements    STM and manual stretching of abd and R axilla (areas of cording)    STM and manual stretching of RUE/areas of fascial tightness    MLD techniques R breast, trunk, axilla, abd, and arm (after STM)    Manual therapy (60 minutes)    Arm volume measurements taken      STM and manual stretching of abd and R axilla (areas of cording)    STM and manual stretching of RUE/areas of fascial  tightness    MLD techniques R breast, trunk, axilla, abd, and arm (after STM) Manual therapy (43 minutes)      STM and manual stretching of abd and R axilla (areas of cording)    STM and manual stretching of RUE/areas of fascial tightness    MLD techniques R breast, trunk, axilla, abd, and arm (after STM)  *reviewed self massage techniques and discussed importance of daily massage even when symptoms are improved to facilitate lymphatics.  *discussed good skin care importance of using lotion daily. Manual Therapy (55 min)    Re-assessment (trunk measurement, arm volume, shld ROM and strength)    MLD R breast/trunk w/ deeper technique    There Ex ( 10 minutes):  - SHld ER GTB x 10  - Shld Ext GTB x 10  - Scap retr x 10      Reviewed but will continue at home.  Additional green band issued to patient today.        There Ex (15 minutes)  -prone scap exercises x 10 reps each (T's, W's, Extension, Snow angels)  Pt lying prone over pillow and towel roll for head support There Ex (5 minutes)    -reviewed prone scap strength exercises.  Demo of \"snow angels\" arm position There Act (20 min)    Reviewed self management (self MLD and STM especially of lateral R breast)    Reviewed HEP, pt reporting not able to do prone scap exercises - encouraged to still try to do these at least once a week but can do the standing scap exercises w/ theraband on the other days    ThereAct (5 min):         Self-Care:  Management/Education: Explained Lymphedema diagnosis; informed patient of lymphedema precautions and risk reduction practices, reviewed eval finding  There Activity (30 minutes)    -reviewed step by step instructions of self MLD  -reviewed breast massage  -discussed skin stretch, gentle pressure, using flats of hands.    -massage when comfortable (shower may be a good time)  -new handouts issued.       There Act (8 minutes)    -discussed/reviewed with patient that days that are more hot and humid, diet, lack of exercise can all  contribute to feeling more \"swollen\".  That it will be an important routine to get in habit of daily self massage of breast tissue and trunk/axilla, daily exercises and good skin care.             Goals:   Goals (discussed and planned with patient involvement):      To be met in 8 visits:  1) Decrease swelling R breast/trunk by a total of 2 cm to decrease risks of infections - MET  2) Pt to be independent with self MLD, ROM exercises, and donning/doffing compression bandages/garments to facilitate swelling reduction and to be independent at self management once discharged -MET   3) Increase R shoulder strength to 5/5 to improve ease of lifting/moving boxes- MET  4) Increase B scap strength (rhomb and mid trap) to at least 4+/5 to improve ease of lifting and performing household chores - almost met, still slightly weak but on the LEFT     Plan:         Charges: mm4, There act 1     Total Timed Treatment: 75 min  Total Treatment Time: 75 min

## 2024-08-22 NOTE — TELEPHONE ENCOUNTER
Hi Dr Baldwin-  I've been working w/ Tiana for her lymphedema and has done well/we are discharging her today). But she has trigger finger R 4th finger that has been worsening- do you want to see her in the office or do you want to refer her to someone? Thanks,  Fawn Roberto PT, DPT, [facility]

## 2024-08-23 ENCOUNTER — PATIENT MESSAGE (OUTPATIENT)
Dept: PHYSICAL THERAPY | Facility: HOSPITAL | Age: 59
End: 2024-08-23

## 2024-08-23 NOTE — TELEPHONE ENCOUNTER
Referred to Provider Information:  referral to orthopedic  Provider Address Phone   Shayne Piña  [address],  [place] [address] [phone]

## 2024-08-30 ENCOUNTER — HOSPITAL ENCOUNTER (OUTPATIENT)
Age: 59
Discharge: HOME | End: 2024-08-30
Attending: EMERGENCY MEDICINE
Payer: COMMERCIAL

## 2024-08-30 VITALS
OXYGEN SATURATION: 98 % | WEIGHT: 200 LBS | DIASTOLIC BLOOD PRESSURE: 64 MMHG | HEART RATE: 69 BPM | TEMPERATURE: 98 F | RESPIRATION RATE: 16 BRPM | BODY MASS INDEX: 37.76 KG/M2 | HEIGHT: 61 IN | SYSTOLIC BLOOD PRESSURE: 124 MMHG

## 2024-08-30 DIAGNOSIS — M25.532 WRIST PAIN, ACUTE, LEFT: Primary | ICD-10-CM

## 2024-08-30 PROCEDURE — 99213 OFFICE O/P EST LOW 20 MIN: CPT

## 2024-08-30 PROCEDURE — 99203 OFFICE O/P NEW LOW 30 MIN: CPT

## 2024-08-30 RX ORDER — IBUPROFEN 600 MG/1
600 TABLET, FILM COATED ORAL ONCE
Status: COMPLETED | OUTPATIENT
Start: 2024-08-30 | End: 2024-08-30

## 2024-08-30 NOTE — DISCHARGE INSTRUCTIONS
Wear wrist splint for immobilization and comfort  Take ibuprofen as needed for pain every 6 hours  Follow-up with your primary care doctor  Return if any worsening symptoms or new concern

## 2024-08-30 NOTE — ED INITIAL ASSESSMENT (HPI)
Yesterday at work, pt was moving heavy boxes and developed intermittent left wrist/hand pain/swelling. Has numbness/tingling in left hand. Pain is positional and radiates up the back of the left arm. No pain meds taken. Supervisor is aware.

## 2024-08-30 NOTE — ED PROVIDER NOTES
Patient Seen in: [facility]      History     Chief Complaint   Patient presents with    Wrist Pain     Stated Complaint: left wrist pain    Subjective:   HPI    59-year-old female with a history of breast cancer, history of hypothyroidism presents to the immediate care for complaints of left wrist pain.  Patient denies any trauma or fall.  She states that she is moving heavy boxes yesterday and started developing pain to her left wrist.  She has some numbness and tingling to her pinky.  She also has some radiation of pain up to her elbow.  Denies any chest pain or shortness of breath.    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    pt unaware    Hypothyroidism    Morbid obesity with BMI of 40.0-44.9, adult (HCC)    KIRTI on CPAP    Postmenopausal bleeding    embx negative              Past Surgical History:   Procedure Laterality Date      ,,,2001    x 4    Colonoscopy N/A 2022    Procedure: COLONOSCOPY;  Surgeon: Jeffrey Alas MD;  Location: [facility] ENDOSCOPY    Lumpectomy right Right 2021    Radiation right Right 10/2021                Social History     Socioeconomic History    Marital status:    Tobacco Use    Smoking status: Former     Current packs/day: 0.00     Types: Cigarettes     Start date: 1979     Quit date: 1979     Years since quittin.7     Passive exposure: Past    Smokeless tobacco: Never    Tobacco comments:     smoked age 14 for one week only   Vaping Use    Vaping status: Never Used   Substance and Sexual Activity    Alcohol use: Yes     Alcohol/week: 0.0 standard drinks of alcohol     Comment: Very infrequently; 1 glass of wine/4 months    Drug use: No   Other Topics Concern    Caffeine Concern Yes     Comment: Coffee 4 cups daily   Social History Narrative    Work in cafeteria              Review of Systems    Positive for stated Chief Complaint: Wrist Pain    Other  systems are as noted in HPI.  Constitutional and vital signs reviewed.      All other systems reviewed and negative except as noted above.    Physical Exam     ED Triage Vitals [08/30/24 1112]   /64   Pulse 69   Resp 16   Temp 98.2 °F (36.8 °C)   Temp src Oral   SpO2 98 %   O2 Device None (Room air)       Current Vitals:   Vital Signs  BP: 124/64  Pulse: 69  Resp: 16  Temp: 98.2 °F (36.8 °C)  Temp src: Oral    Oxygen Therapy  SpO2: 98 %  O2 Device: None (Room air)            Physical Exam    General: Alert and oriented. No acute distress.  HEENT: Normocephalic. No evidence of trauma. Extraocular movements are intact.  Cardiovascular exam: Regular rate and rhythm  Lungs: Clear to auscultation bilaterally.  Abdomen: Soft, nondistended, nontender.  Extremities: No evidence of deformity. No clubbing or cyanosis.  Left upper extremity: Patient denies any pain to palpation of the wrist.  I am able to flex and extend the wrist with some minimal discomfort.  Snuffbox is nontender.  She does have some tenderness to palpation over the medial epicondyle.  This does elicit her pain and causes her pain to radiate up her arm.  Neuro: No focal deficit is noted.    ED Course   Labs Reviewed - No data to display  Low clinical suspicion for an osseous injury.  With her complaints of wrist discomfort and rating pain up the arm may be secondary to a ulnar nerve entrapment or inflammation causing a cubital tunnel syndrome.  Patient will be given a wrist splint for comfort and support.  Recommend ibuprofen for pain.  Follow-up with her primary care doctor.         MDM   Patient was screened and evaluated during this visit.   As a treating physician attending to the patient, I determined, within reasonable clinical confidence and prior to discharge, that an emergency medical condition was not or was no longer present.  There was no indication for further evaluation, treatment or admission on an emergency basis.  Comprehensive verbal  and written discharge and follow-up instructions were provided to help prevent relapse or worsening.  Patient was instructed to follow-up with her primary care provider for further evaluation and treatment, but to return immediately to the ER for worsening, concerning, new, changing or persisting symptoms.  I discussed the case with the patient and they had no questions, complaints, or concerns.  Patient felt comfortable going home.    ^^Please note that this report has been produced using speech recognition software and may contain errors related to that system including, but not limited to, errors in grammar, punctuation, and spelling, as well as words and phrases that possibly may have been recognized inappropriately.  If there are any questions or concerns, contact the dictating provider for clarification                             Medical Decision Making      Disposition and Plan     Clinical Impression:  1. Wrist pain, acute, left         Disposition:  Discharge  8/30/2024 11:46 am    Follow-up:  Gustavo Baldwin MD  [address] [place] [address]  [phone]    Call   As needed, If symptoms worsen          Medications Prescribed:  Discharge Medication List as of 8/30/2024 11:46 AM

## 2024-09-03 ENCOUNTER — TELEPHONE (OUTPATIENT)
Facility: CLINIC | Age: 59
End: 2024-09-03

## 2024-09-03 NOTE — TELEPHONE ENCOUNTER
Patient scheduled with Krystina for steven trigger finger (Right: thumb/ring. Left:pinky/index) please advise if imaging needed.     Future Appointments   Date Time Provider Department Center   9/18/2024  9:00 AM Krystina Marie PA [facility] [place]   11/18/2024  6:30 PM Kim Cortez MD [facility] [place]   12/18/2024  3:30 PM Zoë Alejandra MD [facility]

## 2024-09-09 ENCOUNTER — OFFICE VISIT (OUTPATIENT)
Dept: INTERNAL MEDICINE CLINIC | Facility: CLINIC | Age: 59
End: 2024-09-09
Payer: COMMERCIAL

## 2024-09-09 VITALS
HEART RATE: 67 BPM | WEIGHT: 205.38 LBS | DIASTOLIC BLOOD PRESSURE: 78 MMHG | SYSTOLIC BLOOD PRESSURE: 128 MMHG | OXYGEN SATURATION: 98 % | HEIGHT: 61 IN | BODY MASS INDEX: 38.78 KG/M2

## 2024-09-09 DIAGNOSIS — R03.0 ELEVATED BLOOD PRESSURE READING: ICD-10-CM

## 2024-09-09 DIAGNOSIS — M25.532 LEFT WRIST PAIN: Primary | ICD-10-CM

## 2024-09-09 PROCEDURE — 3074F SYST BP LT 130 MM HG: CPT | Performed by: INTERNAL MEDICINE

## 2024-09-09 PROCEDURE — 3078F DIAST BP <80 MM HG: CPT | Performed by: INTERNAL MEDICINE

## 2024-09-09 PROCEDURE — 99213 OFFICE O/P EST LOW 20 MIN: CPT | Performed by: INTERNAL MEDICINE

## 2024-09-09 PROCEDURE — 3008F BODY MASS INDEX DOCD: CPT | Performed by: INTERNAL MEDICINE

## 2024-09-09 NOTE — PROGRESS NOTES
Tiana Abreu is a 59 year old female who is here for  1. Left wrist pain    2. Elevated blood pressure reading          HPI:   Tiana Abreu is a 59 year old female  presents for wrist pain.    She thinks she sprained it  and antonio tot IC  Was told it was a sprain, given a brace  She wore a brace  And feels the symptoms are the same/worse  Radiating down to her fingers and up to her elbow    Past Medical History:    Breast CA (HCC)    age 56    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    pt unaware    Hypothyroidism    Morbid obesity with BMI of 40.0-44.9, adult (HCC)    KIRTI on CPAP    Postmenopausal bleeding    embx negative     Past Surgical History:   Procedure Laterality Date      ,,,2001    x 4    Colonoscopy N/A 2022    Procedure: COLONOSCOPY;  Surgeon: Jeffrey Alas MD;  Location: [facility] ENDOSCOPY    Lumpectomy right Right 2021    Radiation right Right 10/2021       Current Outpatient Medications:     letrozole 2.5 MG Oral Tab, Take 1 tablet (2.5 mg total) by mouth daily., Disp: 90 tablet, Rfl: 3    levothyroxine 150 MCG Oral Tab, Take 1 tablet (150 mcg total) by mouth before breakfast., Disp: 90 tablet, Rfl: 3    Flaxseed, Linseed, (FLAX SEEDS OR), Take by mouth., Disp: , Rfl:     acetaminophen 500 MG Oral Tab, Take 1 tablet (500 mg total) by mouth every 6 (six) hours as needed for Pain or Fever., Disp: , Rfl:     Multiple Vitamins-Minerals (MULTI-VITAMIN/MINERALS) Oral Tab, Take 1 tablet by mouth daily., Disp: , Rfl:     Ascorbic Acid (VITAMIN C OR), Take by mouth daily., Disp: , Rfl:     Cholecalciferol (VITAMIN D-3 OR), Take by mouth daily., Disp: , Rfl:     Allergies:No Known Allergies  Social History     Socioeconomic History    Marital status:      Spouse name: Not on file    Number of children: Not on file    Years of education: Not on file    Highest education level: Not on file   Occupational History    Not on file   Tobacco Use    Smoking status:  Former     Current packs/day: 0.00     Types: Cigarettes     Start date: 1979     Quit date: 1979     Years since quittin.8     Passive exposure: Past    Smokeless tobacco: Never    Tobacco comments:     smoked age 14 for one week only   Vaping Use    Vaping status: Never Used   Substance and Sexual Activity    Alcohol use: Yes     Alcohol/week: 0.0 standard drinks of alcohol     Comment: Very infrequently; 1 glass of wine/4 months    Drug use: No    Sexual activity: Not on file   Other Topics Concern     Service Not Asked    Blood Transfusions Not Asked    Caffeine Concern Yes     Comment: Coffee 4 cups daily    Occupational Exposure Not Asked    Hobby Hazards Not Asked    Sleep Concern Not Asked    Stress Concern Not Asked    Weight Concern Not Asked    Special Diet Not Asked    Back Care Not Asked    Exercise Not Asked    Bike Helmet Not Asked    Seat Belt Not Asked    Self-Exams Not Asked   Social History Narrative    Work in [employer]     Social Determinants of Health     Financial Resource Strain: Not on file   Food Insecurity: Not on file   Transportation Needs: Not on file   Physical Activity: Not on file   Stress: Not on file   Social Connections: Not on file   Housing Stability: Not on file       REVIEW OF SYSTEMS:     GENERAL HEALTH: No fevers, chills, sweats, fatigue  VISION: No recent vision problems, blurry vision or double vision  HEENT: No decreased hearing ear pain nasal congestion or sore throat  SKIN: denies any unusual skin lesions or rashes  RESPIRATORY: denies shortness of breath, cough, wheezing  CARDIOVASCULAR: denies chest pain on exertion, palpitations, swelling in feet  GI: denies abdominal pain and denies heartburn, nausea or vomiting  : No Pain on urination, change in the color of urine, discharge, urinating frequently  MUS: No back pain, +joint pain, muscle pain  NEURO: denies headaches , anxiety, depression    EXAM:     Vitals:    24 1347 24 1416   BP:  144/80 128/78   Pulse: 70 67   SpO2: 98% 98%   Weight: 205 lb 6.4 oz (93.2 kg)    Height: 5' 1\" (1.549 m)      GENERAL: well developed, well nourished,in no apparent distress  SKIN: no rashes,no suspicious lesions  HEENT: atraumatic, normocephalic,ears and throat are clear,   NECK: supple,no adenopathy,  LUNGS: clear to auscultation, no wheeze  CARDIO: RRR without murmur  GI: good BS's,no masses or tenderness  EXTREMITIES: TTP of palmar wrist joint, increased with both medial and lateral extension, decreased  strength in L>L. no cyanosis, or edema    ASSESSMENT AND PLAN:   1. Left wrist pain  - acute, with pain radiating down to her fingers and up towards her elbow  - brace for 10 days without improvement  Plan  - XR WRIST (1 VIEW), LEFT  (CPT=73100-52); Future  - Physical Therapy Referral - [facility]  - has an appointment with a specialist    2. Elevated blood pressure reading  - repeat was better  - instructed to keep checking her BP at home  - follow up with PCP if elevated    The patient indicates understanding of these issues and agrees to the plan.    No follow-ups on file.        Citlali Awan MD  9/9/2024

## 2024-09-10 ENCOUNTER — HOSPITAL ENCOUNTER (OUTPATIENT)
Dept: GENERAL RADIOLOGY | Age: 59
Discharge: HOME | End: 2024-09-10
Attending: INTERNAL MEDICINE
Payer: COMMERCIAL

## 2024-09-10 DIAGNOSIS — M25.532 LEFT WRIST PAIN: ICD-10-CM

## 2024-09-10 PROCEDURE — 73110 X-RAY EXAM OF WRIST: CPT | Performed by: INTERNAL MEDICINE

## 2024-09-18 ENCOUNTER — OFFICE VISIT (OUTPATIENT)
Dept: ORTHOPEDICS CLINIC | Facility: CLINIC | Age: 59
End: 2024-09-18
Payer: COMMERCIAL

## 2024-09-18 VITALS — BODY MASS INDEX: 38.71 KG/M2 | HEIGHT: 61 IN | WEIGHT: 205 LBS

## 2024-09-18 DIAGNOSIS — M65.341 TRIGGER RING FINGER OF RIGHT HAND: Primary | ICD-10-CM

## 2024-09-18 DIAGNOSIS — M65.311 TRIGGER FINGER OF RIGHT THUMB: ICD-10-CM

## 2024-09-18 PROCEDURE — 20550 NJX 1 TENDON SHEATH/LIGAMENT: CPT | Performed by: PHYSICIAN ASSISTANT

## 2024-09-18 PROCEDURE — 3008F BODY MASS INDEX DOCD: CPT | Performed by: PHYSICIAN ASSISTANT

## 2024-09-18 PROCEDURE — 99204 OFFICE O/P NEW MOD 45 MIN: CPT | Performed by: PHYSICIAN ASSISTANT

## 2024-09-18 RX ORDER — BETAMETHASONE SODIUM PHOSPHATE AND BETAMETHASONE ACETATE 3; 3 MG/ML; MG/ML
6 INJECTION, SUSPENSION INTRA-ARTICULAR; INTRALESIONAL; INTRAMUSCULAR; SOFT TISSUE ONCE
Status: COMPLETED | OUTPATIENT
Start: 2024-09-18 | End: 2024-09-18

## 2024-09-18 RX ADMIN — BETAMETHASONE SODIUM PHOSPHATE AND BETAMETHASONE ACETATE 6 MG: 3; 3 INJECTION, SUSPENSION INTRA-ARTICULAR; INTRALESIONAL; INTRAMUSCULAR; SOFT TISSUE at 09:35:00

## 2024-09-18 NOTE — H&P
Clinic Note [facility]     Assessment/Plan:  59 year old with triggering of right ring and right thumb.  I discussed with the patient various treatment options and their success rate/risks.  We using a shared decision-making process decided to proceed with a corticosteroid injection.   Patient will follow up in 6 weeks.  If patient symptoms are completely resolved, patient can cancel the appointment and follow-up on an as-needed basis.    Left wrist sprain-she is 3 weeks out and has been wearing the brace.  She will continue to wear it full-time only taking off hygiene purposes and gentle range of motion.  In 3 to 4 weeks she can discontinue.    Procedure:  Injection: Written consent was obtained.  Skin was prepped with ChloraPrep.  1 mL of 6 mg of betamethasone and 1 mL of 1% lidocaine was injected into the right ring finger A1 pulley.  Patient tolerated the procedure well.  No complications were encountered.  Band-Aid was applied.    Injection: Written consent was obtained.  Skin was prepped with ChloraPrep.  1 mL of 6 mg of betamethasone and 1 mL of 1% lidocaine was injected into the right thumb a1 pulley.  Patient tolerated the procedure well.  No complications were encountered.  Band-Aid was applied.      Physical Exam:    Ht 5' 1\" (1.549 m)   Wt 205 lb (93 kg)   LMP 04/22/2017   BMI 38.73 kg/m²     Constitutional: NAD. AOx3. Well-developed and Well-nourished.   Psychiatric: Normal mood/ affect/ behavior. Judgment and thought content normal.     Bilateral upper Extremity:   Inspection: Skin Intact. No skin lesions. No gross deformity.   Palpation:  (+) TTP over A1 pulley, mildly tender over the left wrist over the dorsal SL interval and mildly tender over the radial styloid.   Motion: Elbow: normal bilateral symmetric ext/flex  Wrist: normal bilateral symmetric ext/flex/sup/pro  Finger: full composite fist,    Special Tests: (+) Active triggering. (-) PIPJ contracture. Normally sensate digit. Normally  perfused digit.       CC: Triggering of right ring and right thumb    HPI: 59 year old left hand female presents with triggering of right ring and right thumb. It started 3 ago. It has failed to improve/resolve. Pain level is moderate. Pain is described as locking. Patient has tried nothing yet.  Patient also has left wrist pain following an injury where she was lifting up something heavy and felt a strain in her wrist.  She has been treating with her brace    (+) pain at A1 Pulley  (+) active triggering    Past Medical History:    Breast CA (HCC)    age 56    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    pt unaware    Hypothyroidism    Morbid obesity with BMI of 40.0-44.9, adult (HCC)    KIRTI on CPAP    Postmenopausal bleeding    embx negative     Past Surgical History:   Procedure Laterality Date      ,,,2001    x 4    Colonoscopy N/A 2022    Procedure: COLONOSCOPY;  Surgeon: Jeffrey Alas MD;  Location: [facility] ENDOSCOPY    Lumpectomy right Right 2021    Radiation right Right 10/2021     Current Outpatient Medications   Medication Sig Dispense Refill    letrozole 2.5 MG Oral Tab Take 1 tablet (2.5 mg total) by mouth daily. 90 tablet 3    levothyroxine 150 MCG Oral Tab Take 1 tablet (150 mcg total) by mouth before breakfast. 90 tablet 3    Flaxseed, Linseed, (FLAX SEEDS OR) Take by mouth.      acetaminophen 500 MG Oral Tab Take 1 tablet (500 mg total) by mouth every 6 (six) hours as needed for Pain or Fever.      Multiple Vitamins-Minerals (MULTI-VITAMIN/MINERALS) Oral Tab Take 1 tablet by mouth daily.      Ascorbic Acid (VITAMIN C OR) Take by mouth daily.      Cholecalciferol (VITAMIN D-3 OR) Take by mouth daily.       No Known Allergies  Family History   Problem Relation Age of Onset    Heart Disease Father     Melanoma Father 78    Cancer Mother 71        lung & brain also    Uterine Cancer Mother     Diabetes Mother     Glaucoma Mother     Cancer Maternal Grandmother          esophageal    Cancer Maternal Grandfather         stomach    Breast Cancer Self 56     Social History     Occupational History    Not on file   Tobacco Use    Smoking status: Former     Current packs/day: 0.00     Types: Cigarettes     Start date: 1979     Quit date: 1979     Years since quittin.8     Passive exposure: Past    Smokeless tobacco: Never    Tobacco comments:     smoked age 14 for one week only   Vaping Use    Vaping status: Never Used   Substance and Sexual Activity    Alcohol use: Yes     Alcohol/week: 0.0 standard drinks of alcohol     Comment: Very infrequently; 1 glass of wine/4 months    Drug use: No    Sexual activity: Not on file        Review of Systems (negative unless bolded):  General: fevers, chills, fatigue  CV:  chest pain, palpitations, leg swelling  Msk: bodyaches, neck pain, neck stiffness  Skin: rashes, open wounds, nonhealing ulcers  Hem: bleeds easily, bruise easily, immunocompromised  Neuro: dizziness, light headedness, headaches  Psych: anxious, depressed, anger issues    Krystina Marie PA-C  Hand, Wrist, & Elbow Surgery  Physician Assistant to Dr. Shayne [email or web address]  t: [phone]  f: [phone]

## 2024-11-18 ENCOUNTER — OFFICE VISIT (OUTPATIENT)
Dept: DERMATOLOGY CLINIC | Facility: CLINIC | Age: 59
End: 2024-11-18
Payer: COMMERCIAL

## 2024-11-18 DIAGNOSIS — D22.9 MULTIPLE NEVI: ICD-10-CM

## 2024-11-18 DIAGNOSIS — L82.1 SK (SEBORRHEIC KERATOSIS): Primary | ICD-10-CM

## 2024-11-18 DIAGNOSIS — L81.4 LENTIGO: ICD-10-CM

## 2024-11-18 DIAGNOSIS — I78.1 TELANGIECTASIA: ICD-10-CM

## 2024-11-18 DIAGNOSIS — Z12.83 SKIN CANCER SCREENING: ICD-10-CM

## 2024-11-18 DIAGNOSIS — D23.9 BENIGN NEOPLASM OF SKIN, UNSPECIFIED LOCATION: ICD-10-CM

## 2024-11-18 PROCEDURE — 99203 OFFICE O/P NEW LOW 30 MIN: CPT | Performed by: DERMATOLOGY

## 2024-12-01 NOTE — PROGRESS NOTES
Tiana Abreu is a 59 year old female.    CC:    Chief Complaint   Patient presents with    Upper Body Exam     \"New Patient\" with referral for numerous moles. Presents with c/o flat lesion on L breast for 30 years. Denies itching and scaling. Denies personal Hx of skin caner. Family Hx of SCC(Father), and (Sister) unknown type.         HISTORY:    Past Medical History:    Breast CA (HCC)    age 56    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    pt unaware    Hypothyroidism    Morbid obesity with BMI of 40.0-44.9, adult (HCC)    KIRTI on CPAP    Postmenopausal bleeding    embx negative      Past Surgical History:   Procedure Laterality Date      ,,,2001    x 4    Colonoscopy N/A 2022    Procedure: COLONOSCOPY;  Surgeon: Jeffrey Alas MD;  [facility] ENDOSCOPY    Lumpectomy right Right 2021    Radiation right Right 10/2021      Family History   Problem Relation Age of Onset    Heart Disease Father     Melanoma Father 78    Cancer Mother 71        lung & brain also    Uterine Cancer Mother     Diabetes Mother     Glaucoma Mother     Cancer Maternal Grandmother         esophageal    Cancer Maternal Grandfather         stomach    Breast Cancer Self 56      Social History     Socioeconomic History    Marital status:    Tobacco Use    Smoking status: Former     Current packs/day: 0.00     Types: Cigarettes     Start date: 1979     Quit date: 1979     Years since quittin.0     Passive exposure: Past    Smokeless tobacco: Never    Tobacco comments:     smoked age 14 for one week only   Vaping Use    Vaping status: Never Used   Substance and Sexual Activity    Alcohol use: Yes     Alcohol/week: 0.0 standard drinks of alcohol     Comment: Very infrequently; 1 glass of wine/4 months    Drug use: No   Other Topics Concern    Caffeine Concern Yes     Comment: Coffee 4 cups daily    History of tanning Yes    Reaction to local anesthetic No    Pt has a pacemaker No    Pt has  a defibrillator No   Social History Narrative    Work in [employer]        Current Outpatient Medications   Medication Sig Dispense Refill    letrozole 2.5 MG Oral Tab Take 1 tablet (2.5 mg total) by mouth daily. 90 tablet 3    levothyroxine 150 MCG Oral Tab Take 1 tablet (150 mcg total) by mouth before breakfast. 90 tablet 3    Flaxseed, Linseed, (FLAX SEEDS OR) Take by mouth.      acetaminophen 500 MG Oral Tab Take 1 tablet (500 mg total) by mouth every 6 (six) hours as needed for Pain or Fever.      Multiple Vitamins-Minerals (MULTI-VITAMIN/MINERALS) Oral Tab Take 1 tablet by mouth daily.      Ascorbic Acid (VITAMIN C OR) Take by mouth daily.      Cholecalciferol (VITAMIN D-3 OR) Take by mouth daily.       Allergies:   Allergies[1]    Past Medical History:    Breast CA (HCC)    age 56    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    pt unaware    Hypothyroidism    Morbid obesity with BMI of 40.0-44.9, adult (HCC)    KIRTI on CPAP    Postmenopausal bleeding    embx negative     Past Surgical History:   Procedure Laterality Date      ,,,2001    x 4    Colonoscopy N/A 2022    Procedure: COLONOSCOPY;  Surgeon: Jeffrey Alas MD;  Location: [facility] ENDOSCOPY    Lumpectomy right Right 2021    Radiation right Right 10/2021     Social History     Socioeconomic History    Marital status:      Spouse name: Not on file    Number of children: Not on file    Years of education: Not on file    Highest education level: Not on file   Occupational History    Not on file   Tobacco Use    Smoking status: Former     Current packs/day: 0.00     Types: Cigarettes     Start date: 1979     Quit date: 1979     Years since quittin.0     Passive exposure: Past    Smokeless tobacco: Never    Tobacco comments:     smoked age 14 for one week only   Vaping Use    Vaping status: Never Used   Substance and Sexual Activity    Alcohol use: Yes     Alcohol/week: 0.0 standard drinks of alcohol     Comment:  Very infrequently; 1 glass of wine/4 months    Drug use: No    Sexual activity: Not on file   Other Topics Concern     Service Not Asked    Blood Transfusions Not Asked    Caffeine Concern Yes     Comment: Coffee 4 cups daily    Occupational Exposure Not Asked    Hobby Hazards Not Asked    Sleep Concern Not Asked    Stress Concern Not Asked    Weight Concern Not Asked    Special Diet Not Asked    Back Care Not Asked    Exercise Not Asked    Bike Helmet Not Asked    Seat Belt Not Asked    Self-Exams Not Asked    Grew up on a farm Not Asked    History of tanning Yes    Outdoor occupation Not Asked    Breast feeding Not Asked    Reaction to local anesthetic No    Pt has a pacemaker No    Pt has a defibrillator No   Social History Narrative    Work in [employer]     Social Drivers of Health     Financial Resource Strain: Not on file   Food Insecurity: Not on file   Transportation Needs: Not on file   Physical Activity: Not on file   Stress: Not on file   Social Connections: Not on file   Housing Stability: Not on file     Family History   Problem Relation Age of Onset    Heart Disease Father     Melanoma Father 78    Cancer Mother 71        lung & brain also    Uterine Cancer Mother     Diabetes Mother     Glaucoma Mother     Cancer Maternal Grandmother         esophageal    Cancer Maternal Grandfather         stomach    Breast Cancer Self 56       HPI:     HPI:  Chief Complaint   Patient presents with    Upper Body Exam     \"New Patient\" with referral for numerous moles. Presents with c/o flat lesion on L breast for 30 years. Denies itching and scaling. Denies personal Hx of skin caner. Family Hx of SCC(Father), and (Sister) unknown type.         No personal  or family history of skin cancer    Patient presents with concerns above.    Patient has been in their usual state of health.     Past notes/ records and appropriate/relevant lab results including pathology and past body maps reviewed. Including outside  notes/ PCP notes as appropriate. Updated and new information noted in current visit.     ROS:  Denies other relevant systemic complaints. See HPI.     History, medications, allergies reviewed as noted.    Allergies:  Patient has no known allergies.      There were no vitals filed for this visit.    Physical Examination:     Well-developed well-nourished patient alert oriented in no acute distress.  Exam performed of appropriate involved areas    Multiple light to medium brown, well marginated, uniformly pigmented, macules and papules 6 mm and less scattered on exam. pigmented lesions examined with dermoscopy benign-appearing patterns.     Waxy tannish keratotic papules scattered, cherry-red vascular papules scattered.    See map today's date for lesions noted .  See assessment and plan below for specific lesions.    Otherwise remarkable for lesions as noted on map.    See A/P  below for additional information:    Assessment / plan:    No orders of the defined types were placed in this encounter.      Meds & Refills for this Visit:  Requested Prescriptions      No prescriptions requested or ordered in this encounter         Encounter Diagnoses   Name Primary?    SK (seborrheic keratosis) Yes    Lentigo     Multiple nevi     Benign neoplasm of skin, unspecified location     Telangiectasia     Skin cancer screening      Patient with history of severe sun poisoning burn on face approximately more than 30 years ago.  No atypical features, mild sun damage with ephelides, continue sunscreen sun protection, will plan follow-up annually    Patient with history of breast cancer, telangiectasia's at inframammary crease right, patient with history of radiation.  Monitor  Monitor.    Multiple benign keratoses in particular at left breast, angioma at inframammary crease left, skin tags.  Reassurance.  Benign-appearing nevi, no atypical features on dermoscopy reassurance given monitor.     Waxy tan keratotic papules lesions in  areas of concern as noted reassurance given.  Benign nature discussed.  Possibility of cryo, alphahydroxy acids over-the-counter retinol's discussed.     No other susupicious lesions on todays  exam.    Please refer to map for specific lesions.  See additional diagnoses.  Pros cons of various therapies, risks benefits discussed.Pathophysiology, terapeutic options reviewed.  See  Medications in grid.  Instructions reviewed at length.    Benign nevi, seborrheic  keratoses, cherry angiomas:  Reassurance regarding other benign skin lesions.    Monitor for new or changing lesions. Signs and symptoms of skin cancer, ABCDE's of melanoma ( additional information available at AAD.org, skincancer.org) Encourage Sunscreen (broad-spectrum, ideally mineral-based-UVA/UVB -SPF 30 or higher) use encouraged, sun protection/sun protective clothing, self exams reviewed Followup as noted RTC ---routine checkup 6 mos -one year or p.r.n.    Encounter Times   Including precharting, reviewing chart, prior notes obtaining history: 10 minutes, medical exam :10 minutes, notes on body map, plan, counseling 10minutes My total time spent caring for the patient on the day of the encounter: 30 minutes     The patient indicates understanding of these issues and agrees to the plan.  The patient is asked to return as noted in follow-up/ above.    This note was generated using Dragon voice recognition software.  Please contact me regarding any confusion resulting from errors in recognition..  Note to patient and family: The 21st Century Cures Act makes medical notes like these available to patients. However, be advised this is a medical document. It is intended as [id number] communication and monitoring of a patient's care needs. It is written in medical language and may contain abbreviations or verbiage that are unfamiliar. It may appear blunt or direct. Medical documents are intended to carry relevant information, facts as evident and the clinical  opinion of the practitioner.       [1] No Known Allergies

## 2024-12-18 ENCOUNTER — OFFICE VISIT (OUTPATIENT)
Age: 59
End: 2024-12-18
Attending: INTERNAL MEDICINE
Payer: COMMERCIAL

## 2024-12-18 ENCOUNTER — TELEPHONE (OUTPATIENT)
Facility: CLINIC | Age: 59
End: 2024-12-18

## 2024-12-18 VITALS
HEART RATE: 72 BPM | SYSTOLIC BLOOD PRESSURE: 125 MMHG | BODY MASS INDEX: 39.65 KG/M2 | OXYGEN SATURATION: 98 % | TEMPERATURE: 98 F | DIASTOLIC BLOOD PRESSURE: 68 MMHG | HEIGHT: 61 IN | WEIGHT: 210 LBS

## 2024-12-18 DIAGNOSIS — E89.89 LYMPHEDEMA OF UPPER EXTREMITY FOLLOWING LYMPHADENECTOMY: ICD-10-CM

## 2024-12-18 DIAGNOSIS — L76.82 AXILLARY WEB SYNDROME: ICD-10-CM

## 2024-12-18 DIAGNOSIS — Z08 ENCOUNTER FOR FOLLOW-UP SURVEILLANCE OF BREAST CANCER: ICD-10-CM

## 2024-12-18 DIAGNOSIS — I89.0 LYMPHEDEMA OF BREAST: ICD-10-CM

## 2024-12-18 DIAGNOSIS — I89.0 LYMPHEDEMA OF UPPER EXTREMITY FOLLOWING LYMPHADENECTOMY: ICD-10-CM

## 2024-12-18 DIAGNOSIS — Z79.811 ENCOUNTER FOR MONITORING AROMATASE INHIBITOR THERAPY: ICD-10-CM

## 2024-12-18 DIAGNOSIS — Z51.81 ENCOUNTER FOR MONITORING AROMATASE INHIBITOR THERAPY: ICD-10-CM

## 2024-12-18 DIAGNOSIS — Z85.3 ENCOUNTER FOR FOLLOW-UP SURVEILLANCE OF BREAST CANCER: ICD-10-CM

## 2024-12-18 DIAGNOSIS — L90.5 AXILLARY WEB SYNDROME: ICD-10-CM

## 2024-12-18 DIAGNOSIS — Z78.0 POST-MENOPAUSAL: ICD-10-CM

## 2024-12-18 DIAGNOSIS — Z17.0 MALIGNANT NEOPLASM OF UPPER-OUTER QUADRANT OF RIGHT BREAST IN FEMALE, ESTROGEN RECEPTOR POSITIVE (HCC): Primary | ICD-10-CM

## 2024-12-18 DIAGNOSIS — C50.411 MALIGNANT NEOPLASM OF UPPER-OUTER QUADRANT OF RIGHT BREAST IN FEMALE, ESTROGEN RECEPTOR POSITIVE (HCC): Primary | ICD-10-CM

## 2024-12-18 NOTE — TELEPHONE ENCOUNTER
Patient scheduled online for \"Right ring finger is starting to be hard to move and my palm is sore. Left wrist occasionally sore.\"  LOV 9/18/24    Future Appointments   Date Time Provider Department Center   12/18/2024  3:30 PM Zoë Alejandra MD [facility]   1/3/2025 11:40 AM [id number]     Please advise if imaging is needed.

## 2024-12-18 NOTE — PROGRESS NOTES
HPI:  Tiana Abreu is a 59 year old female with diagnosis of   Encounter Diagnoses   Name Primary?    Malignant neoplasm of upper-outer quadrant of right breast in female, estrogen receptor positive (HCC) Yes    Encounter for monitoring aromatase inhibitor therapy     Encounter for follow-up surveillance of breast cancer     Lymphedema of breast     Axillary web syndrome     Post-menopausal     Lymphedema of upper extremity following lymphadenectomy        Compliance with SBE: Yes. Changes on SBE: No.      Compliance with letrozole:  compliance all of the time    Side effects from letrozole: Yes hot flashes improved with flax seed oil, No arthralgias or myalgias.    Tight at the R arm.  R fingers stuck at times, state from holding in position for a long time with driving.  Only R hand.  Not stretching.  Does have HEP papers at home.  Has not been doing massages more often.  States going for shots again.      Working on weight loss.       ECOG PS: 0      Review of Systems   Constitutional:  Negative for appetite change, fatigue and unexpected weight change.   Respiratory:  Negative for cough and shortness of breath.    Cardiovascular:  Negative for chest pain.   Gastrointestinal:  Negative for abdominal pain.   Endocrine: Positive for hot flashes.   Musculoskeletal:  Positive for arthralgias (as above.  Knees from stairs). Negative for back pain and neck pain.        No bone pain   Neurological:  Negative for dizziness and headaches.   Hematological:  Negative for adenopathy.   Psychiatric/Behavioral:  Positive for sleep disturbance (KIRTI using CPAP).        ALLERGIES AND MEDICATIONS REVIEWED WITH PATIENT:    Allergies:  No Known Allergies      Current Outpatient Medications:     letrozole 2.5 MG Oral Tab, Take 1 tablet (2.5 mg total) by mouth daily., Disp: 90 tablet, Rfl: 3    levothyroxine 150 MCG Oral Tab, Take 1 tablet (150 mcg total) by mouth before breakfast., Disp: 90 tablet, Rfl: 3    Flaxseed, Linseed,  (FLAX SEEDS OR), Take by mouth., Disp: , Rfl:     acetaminophen 500 MG Oral Tab, Take 1 tablet (500 mg total) by mouth every 6 (six) hours as needed for Pain or Fever., Disp: , Rfl:     Multiple Vitamins-Minerals (MULTI-VITAMIN/MINERALS) Oral Tab, Take 1 tablet by mouth daily., Disp: , Rfl:     Ascorbic Acid (VITAMIN C OR), Take by mouth daily., Disp: , Rfl:     Cholecalciferol (VITAMIN D-3 OR), Take by mouth daily., Disp: , Rfl:         HISTORY REVIEWED WITH PATIENT:  Past Medical History:    Breast CA (HCC)    age 56    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    pt unaware    Hypothyroidism    Morbid obesity with BMI of 40.0-44.9, adult (HCC)    KIRTI on CPAP    Postmenopausal bleeding    embx negative      Past Surgical History:   Procedure Laterality Date      ,,,2001    x 4    Colonoscopy N/A 2022    Procedure: COLONOSCOPY;  Surgeon: Jeffrey Alas MD;  Location: [facility] ENDOSCOPY    Lumpectomy right Right 2021    Radiation right Right 10/2021      Family History   Problem Relation Age of Onset    Heart Disease Father     Melanoma Father 78    Cancer Mother 71        lung & brain also    Uterine Cancer Mother     Diabetes Mother     Glaucoma Mother     Cancer Maternal Grandmother         esophageal    Cancer Maternal Grandfather         stomach    Breast Cancer Self 56      Social History     Socioeconomic History    Marital status:    Tobacco Use    Smoking status: Former     Current packs/day: 0.00     Types: Cigarettes     Start date: 1979     Quit date: 1979     Years since quittin.0     Passive exposure: Past    Smokeless tobacco: Never    Tobacco comments:     smoked age 14 for one week only   Vaping Use    Vaping status: Never Used   Substance and Sexual Activity    Alcohol use: Yes     Alcohol/week: 0.0 standard drinks of alcohol     Comment: Very infrequently; 1 glass of wine/4 months    Drug use: No   Other Topics Concern    Caffeine Concern Yes      Comment: Coffee 4 cups daily    History of tanning Yes    Reaction to local anesthetic No    Pt has a pacemaker No    Pt has a defibrillator No   Social History Narrative    Work in cafeteria          Exam:  /68 (Patient Position: Sitting, Cuff Size: large)   Pulse 72   Temp 97.9 °F (36.6 °C) (Oral)   Ht 1.549 m (5' 1\")   Wt 95.3 kg (210 lb)   LMP 04/22/2017   SpO2 98%   BMI 39.68 kg/m²   Wt Readings from Last 6 Encounters:   12/18/24 95.3 kg (210 lb)   09/18/24 93 kg (205 lb)   09/09/24 93.2 kg (205 lb 6.4 oz)   08/30/24 90.7 kg (200 lb)   08/20/24 91.6 kg (202 lb)   07/30/24 90.7 kg (200 lb)     General: Patient is alert, not in acute distress.  HEENT: EOMs intact. PERRL.   Neck: No JVD. No palpable lymphadenopathy. Neck is supple.  Chest: Clear to auscultation.  Breasts: R breast with surgical changes and RT changes, mild lymphedema, no masses.  No cording on the R axilla.  L breast with no masses.   Heart: Regular rate and rhythm.   Abdomen: Soft, non tender with good bowel sounds.  Extremities: No edema.  Neurological: Grossly intact.   Lymphatics: There is no palpable lymphadenopathy throughout in the cervical, supraclavicular, axillary, or inguinal regions.  Psych/Depression: nl      Assessment and Plan:    Tiana Abreu is a 59 year old female being evaluated for   Encounter Diagnoses   Name Primary?    Malignant neoplasm of upper-outer quadrant of right breast in female, estrogen receptor positive (HCC) Yes    Encounter for monitoring aromatase inhibitor therapy     Encounter for follow-up surveillance of breast cancer     Lymphedema of breast     Axillary web syndrome     Post-menopausal     Lymphedema of upper extremity following lymphadenectomy        Cancer Staging  Malignant neoplasm of upper-outer quadrant of right breast in female, estrogen receptor positive (HCC)  Staging form: Breast, AJCC 8th Edition  - Pathologic stage from 8/11/2021: Stage IIA (pT2, pN1a(sn), cM0, G3, ER+, MS+,  HER2-, Oncotype DX score: 10) - Signed by Zoë Alejandra MD       She is doing well.     She is to complete Letrozole in December of 2026 for 5 yrs of treatment.    B breast done in June of 2024, BIRADS-2.  Next due in June 2025.    DEXA in Sept 2023 was normal, next due in Sept of 2025.    Lymphedema of the breast and axillary web syndrome:  improving with PT and HEP.      No follow-ups on file.      MDM moderate.     Data:       SUSANA CHELA 2D+3D SCREENING BILAT (CPT=[id number]) [[id number]]Collected: 06/07/24 1544 Order Status: CompletedUpdated: 06/07/24 1544 Narrative:  PROCEDURE: SUSANA CHELA 2D+3D SCREENING BILAT ([id number])      COMPARISON: [facility], SUSANA CHELA 2D+3D DIAGNOSTIC SUSANA BILAT (YUQ=[id number]), 5/02/2022, 8:44 AM.  [facility], SUSANA CHELA 2D+3D DIAGNOSTIC SUSANA RIGHT ([id number]), 12/01/2022, 7:21 AM.  [facility], SUSANA CHELA 2D+3D DIAGNOSTIC SUSANA BILAT (RGE=[id number]), 6/06/2023, 8:43 AM.  [facility], SUSANA CHELA 2D+3D DIAGNOSTIC SUSANA RIGHT ([id number]), 11/29/2023, 6:55 AM.      INDICATIONS: Z17.0 Malignant neoplasm of upper-outer quadrant of right breast in female, estrogen receptor positive (HCC) C50.411 Malignant neoplasm of upper-outer quadrant*      TECHNIQUE: Full field direct screening mammography was performed and images were reviewed with the [email or web address] PILAR 1.5.1.5 CAD device.  3D tomosynthesis was performed and reviewed         BREAST COMPOSITION:   Category b-Scattered areas fibroglandular density.         FINDINGS: Surgical changes consistent with right lumpectomy are noted.  There is no suspicious asymmetry, mass, architectural distortion, or microcalcifications identified in either breast.  Again seen are multiple enlarged lymph nodes in the left   axilla, unchanged since at least 2022.  Fat necrosis is noted in the right breast.                 Impression:  CONCLUSION:   There is no  mammographic evidence of malignancy in either breast. As long as patient's clinical breast exam remains unchanged, annual screening mammogram is recommended.      BI-RADS CATEGORY:     DIAGNOSTIC CATEGORY 2--BENIGN FINDING:       RECOMMENDATIONS:   ROUTINE MAMMOGRAM AND CLINICAL EVALUATION IN 12 MONTHS.                   PLEASE NOTE: NORMAL MAMMOGRAM DOES NOT EXCLUDE THE POSSIBILITY OF BREAST CANCER.  A CLINICALLY SUSPICIOUS PALPABLE LUMP SHOULD BE BIOPSIED.       For patients over the age of 40, the target due date for the patient's next mammogram has been entered into a reminder system.       Patient received a discharge summary from the technologist after completion of exam.      Breast marker legend used on images    Triangle = Palpable lump   Charleston = Skin tag or mole   BB = Nipple   Linear fiona = Scar   Square = Pain            Dictated by (CST): Honorio Murray DO on 6/07/2024 at 3:42 PM       Finalized by (CST): Honorio Murray DO on 6/07/2024 at 3:43 PM    Narrative   This is an over read for the non-cardiac, non-vascular limited visualized portions of the chest and abdomen.      PROCEDURE:  CT CALCIUM SCORING OVER READ      COMPARISON:  None.      INDICATIONS:  Z13.6 Screening for cardiovascular condition      TECHNIQUE:  Unenhanced multislice CT scanning is performed through the chest as part of a cardiac CT evaluation.  Dose reduction techniques were used. Dose information is transmitted to the ACR (American College of Radiology) NRDR (National Radiology   Data Registry) which includes the Dose Index Registry.      PATIENT STATED HISTORY: (As transcribed by Technologist)           FINDINGS:    LUNGS:  No focal consolidation.  Right upper lobe and to a lesser extent right middle lobe subpleural interstitial density with pleural thickening.  Lingular subpleural 2-3 mm calcified granuloma.   GINA:  No mass or adenopathy.    MEDIASTINUM:  No mass or adenopathy.    PLEURA:  No mass or effusion.     CHEST WALL:  No mass or axillary adenopathy.    LIMITED ABDOMEN:  Limited images of the upper abdomen are unremarkable.    BONES:  Mild degenerative change.  No fracture.          This is an over read for the non-cardiac, non-vascular limited visualized portions of the chest and abdomen. This exam was not performed as a complete diagnostic CT of the chest. Please see the cardiologists' dictation which is reported separately.                     Impression   CONCLUSION:    1. Right upper and middle lobe subpleural interstitial densities with pleural thickening suggest chronic changes, correlate clinically with post treatment changes.  If patient has prior CTs of the chest, these would be helpful for further evaluation and   to assess stability of the above findings.            LOCATION:  [facility]         Dictated by (CST): Brisa Umana MD on 4/18/2024 at 4:06 PM       Finalized by (CST): Brisa Umana MD on 4/18/2024 at 4:09 PM

## 2025-01-07 ENCOUNTER — PATIENT MESSAGE (OUTPATIENT)
Dept: INTERNAL MEDICINE CLINIC | Facility: CLINIC | Age: 60
End: 2025-01-07

## 2025-01-13 ENCOUNTER — TELEPHONE (OUTPATIENT)
Dept: CASE MANAGEMENT | Age: 60
End: 2025-01-13

## 2025-01-13 DIAGNOSIS — M25.521 RIGHT ELBOW PAIN: ICD-10-CM

## 2025-01-13 DIAGNOSIS — M65.30 TRIGGER FINGER, UNSPECIFIED FINGER, UNSPECIFIED LATERALITY: ICD-10-CM

## 2025-01-13 DIAGNOSIS — M25.532 LEFT WRIST PAIN: Primary | ICD-10-CM

## 2025-01-13 NOTE — TELEPHONE ENCOUNTER
Dr. Baldwin,     Patient called requesting referral to [facility].     Pended referral please review diagnosis and sign off if you agree.    Thank you.  Ameena Lawson  [facility]

## 2025-01-27 ENCOUNTER — OFFICE VISIT (OUTPATIENT)
Dept: ORTHOPEDICS CLINIC | Facility: CLINIC | Age: 60
End: 2025-01-27
Payer: COMMERCIAL

## 2025-01-27 VITALS — HEIGHT: 61 IN | WEIGHT: 210 LBS | BODY MASS INDEX: 39.65 KG/M2

## 2025-01-27 DIAGNOSIS — S63.502A SPRAIN OF LEFT WRIST, INITIAL ENCOUNTER: ICD-10-CM

## 2025-01-27 DIAGNOSIS — G56.01 CARPAL TUNNEL SYNDROME OF RIGHT WRIST: ICD-10-CM

## 2025-01-27 DIAGNOSIS — M65.341 TRIGGER RING FINGER OF RIGHT HAND: Primary | ICD-10-CM

## 2025-01-27 DIAGNOSIS — M65.311 TRIGGER FINGER OF RIGHT THUMB: ICD-10-CM

## 2025-01-27 RX ORDER — BETAMETHASONE SODIUM PHOSPHATE AND BETAMETHASONE ACETATE 3; 3 MG/ML; MG/ML
6 INJECTION, SUSPENSION INTRA-ARTICULAR; INTRALESIONAL; INTRAMUSCULAR; SOFT TISSUE ONCE
Status: COMPLETED | OUTPATIENT
Start: 2025-01-27 | End: 2025-01-27

## 2025-01-27 RX ADMIN — BETAMETHASONE SODIUM PHOSPHATE AND BETAMETHASONE ACETATE 6 MG: 3; 3 INJECTION, SUSPENSION INTRA-ARTICULAR; INTRALESIONAL; INTRAMUSCULAR; SOFT TISSUE at 14:41:00

## 2025-01-27 NOTE — PROGRESS NOTES
Clinic Note [facility]     Assessment/Plan:  59 year old with   triggering of right ring and right thumb.  I discussed with the patient various treatment options and their success rate/risks.  We using a shared decision-making process decided to proceed with a corticosteroid injection.   Patient will follow up in 6 weeks.  If patient symptoms are completely resolved, patient can cancel the appointment and follow-up on an as-needed basis.  Left wrist sprain from a work injury 8/2024-she still having pain and symptoms I would recommend an MRI at this point as she is failed all conservative management.  Will have her return to go over the MRI.  This could be from exacerbation of thumb arthritis or sprain of the SL ligament.  MRI will help direct treatment for that.  Right hand numbness and tingling possible carpal tunnel syndrome-will try carpal tunnel brace at night for the next 4 weeks to see how she responds.  She may need an EMG in the future.  All of her questions were answered.    Repeat    Procedure:  Injection: Written consent was obtained.  Skin was prepped with alcohol.  1 mL of 6 mg of betamethasone and 1 mL of 1% lidocaine was injected into the right ring finger A1 pulley.  Patient tolerated the procedure well.  No complications were encountered.  Band-Aid was applied.    Injection: Written consent was obtained.  Skin was prepped with alcohol.  1 mL of 6 mg of betamethasone and 1 mL of 1% lidocaine was injected into the right thumb a1 pulley.  Patient tolerated the procedure well.  No complications were encountered.  Band-Aid was applied.      Physical Exam:    Ht 5' 1\" (1.549 m)   Wt 210 lb (95.3 kg)   LMP 04/22/2017   BMI 39.68 kg/m²     Constitutional: NAD. AOx3. Well-developed and Well-nourished.   Psychiatric: Normal mood/ affect/ behavior. Judgment and thought content normal.     Bilateral upper Extremity:   Inspection: Skin Intact. No skin lesions. No gross deformity.   Palpation:  (+) TTP  over A1 pulley, mildly tender over the left wrist over the dorsal SL interval and mildly tender over the radial styloid.   Motion: Elbow: normal bilateral symmetric ext/flex  Wrist: normal bilateral symmetric ext/flex/sup/pro  Finger: full composite fist,    Special Tests: (+) Active triggering. (-) PIPJ contracture. Normally sensate digit. Normally perfused digit.     Sensory: Positive Tinel's and median compression test on the right.  Sensation intact.      CC: Triggering of right ring and right thumb    HPI: 59 year old left hand female presents with triggering of right ring and right thumb. It started 3 ago. It has failed to improve/resolve. Pain level is moderate. Pain is described as locking. Patient has tried nothing yet.  Patient also has left wrist pain following an injury where she was lifting up something heavy and felt a strain in her wrist.  She has been treating with her brace    (+) pain at A1 Pulley  (+) active triggering    Interval history last injections helped under percent of her symptoms except for some stiffness in the ring finger but now her pain and triggering is worse.  She also continues to have left wrist pain.  She was injured at work when she fell and her left wrist is still bothering her this happened in August.  She wore the wrist brace for 6 weeks and continues to have pain with certain activities and weakness.  She also notes numbness and tingling on the right side of the thumb index and middle finger that wakes her up at night as well as sometimes during the day.    Past Medical History:    Breast CA (HCC)    age 56    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    pt unaware    Hypothyroidism    Morbid obesity with BMI of 40.0-44.9, adult (HCC)    KIRTI on CPAP    Postmenopausal bleeding    embx negative     Past Surgical History:   Procedure Laterality Date      ,,,2001    x 4    Colonoscopy N/A 2022    Procedure: COLONOSCOPY;  Surgeon: Jeffrey Alas MD;   Location: [facility] ENDOSCOPY    Lumpectomy right Right 2021    Radiation right Right 10/2021     Current Outpatient Medications   Medication Sig Dispense Refill    letrozole 2.5 MG Oral Tab Take 1 tablet (2.5 mg total) by mouth daily. 90 tablet 3    levothyroxine 150 MCG Oral Tab Take 1 tablet (150 mcg total) by mouth before breakfast. 90 tablet 3    Flaxseed, Linseed, (FLAX SEEDS OR) Take by mouth.      acetaminophen 500 MG Oral Tab Take 1 tablet (500 mg total) by mouth every 6 (six) hours as needed for Pain or Fever.      Multiple Vitamins-Minerals (MULTI-VITAMIN/MINERALS) Oral Tab Take 1 tablet by mouth daily.      Ascorbic Acid (VITAMIN C OR) Take by mouth daily.      Cholecalciferol (VITAMIN D-3 OR) Take by mouth daily.       No Known Allergies  Family History   Problem Relation Age of Onset    Heart Disease Father     Melanoma Father 78    Cancer Mother 71        lung & brain also    Uterine Cancer Mother     Diabetes Mother     Glaucoma Mother     Cancer Maternal Grandmother         esophageal    Cancer Maternal Grandfather         stomach    Breast Cancer Self 56     Social History     Occupational History    Not on file   Tobacco Use    Smoking status: Former     Current packs/day: 0.00     Types: Cigarettes     Start date: 1979     Quit date: 1979     Years since quittin.1     Passive exposure: Past    Smokeless tobacco: Never    Tobacco comments:     smoked age 14 for one week only   Vaping Use    Vaping status: Never Used   Substance and Sexual Activity    Alcohol use: Yes     Alcohol/week: 0.0 standard drinks of alcohol     Comment: Very infrequently; 1 glass of wine/4 months    Drug use: No    Sexual activity: Not on file        Review of Systems (negative unless bolded):  General: fevers, chills, fatigue  CV:  chest pain, palpitations, leg swelling  Msk: bodyaches, neck pain, neck stiffness  Skin: rashes, open wounds, nonhealing ulcers  Hem: bleeds easily, bruise easily,  immunocompromised  Neuro: dizziness, light headedness, headaches  Psych: anxious, depressed, anger issues    Krystina Marie PA-C  Hand, Wrist, & Elbow Surgery  Physician Assistant to Dr. Shayne [email or web address]  t: [phone]  f: [phone]

## 2025-01-31 ENCOUNTER — HOSPITAL ENCOUNTER (OUTPATIENT)
Dept: MRI IMAGING | Facility: HOSPITAL | Age: 60
Discharge: HOME | End: 2025-01-31
Attending: PHYSICIAN ASSISTANT
Payer: OTHER MISCELLANEOUS

## 2025-01-31 DIAGNOSIS — S63.502A SPRAIN OF LEFT WRIST, INITIAL ENCOUNTER: ICD-10-CM

## 2025-01-31 PROCEDURE — 73221 MRI JOINT UPR EXTREM W/O DYE: CPT | Performed by: PHYSICIAN ASSISTANT

## 2025-02-10 ENCOUNTER — OFFICE VISIT (OUTPATIENT)
Dept: ORTHOPEDICS CLINIC | Facility: CLINIC | Age: 60
End: 2025-02-10

## 2025-02-10 VITALS — BODY MASS INDEX: 39.65 KG/M2 | WEIGHT: 210 LBS | HEIGHT: 61 IN

## 2025-02-10 DIAGNOSIS — M19.039 WRIST ARTHRITIS: ICD-10-CM

## 2025-02-10 DIAGNOSIS — S63.502A SPRAIN OF LEFT WRIST, INITIAL ENCOUNTER: Primary | ICD-10-CM

## 2025-02-10 PROCEDURE — 99213 OFFICE O/P EST LOW 20 MIN: CPT | Performed by: PHYSICIAN ASSISTANT

## 2025-02-10 NOTE — PROGRESS NOTES
Clinic Note [facility]     Assessment/Plan:  59 year old with   triggering of right ring and right thumb.  Currently asymptomatic status post 2 injections.    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  We discussed cortisone injection but she would like to hold off at this point.  She has pain with daily activities but other days she does not have any pain.  We discussed trying a course of therapy to exhaust all conservative management options.  She will complete 4 to 6 weeks and return for reevaluation.    Right hand numbness and tingling possible carpal tunnel syndrome-minimally symptomatic and we will observe.      Physical Exam:    Ht 5' 1\" (1.549 m)   Wt 210 lb (95.3 kg)   LMP 04/22/2017   BMI 39.68 kg/m²     Constitutional: NAD. AOx3. Well-developed and Well-nourished.   Psychiatric: Normal mood/ affect/ behavior. Judgment and thought content normal.     Bilateral upper Extremity:   Inspection: Skin Intact. No skin lesions. No gross deformity.   Palpation:  (+) TTP over A1 pulley, mildly tender over the left wrist over the dorsal SL interval and mildly tender over the radial styloid.   Motion: Elbow: normal bilateral symmetric ext/flex  Wrist: normal bilateral symmetric ext/flex/sup/pro  Finger: full composite fist,    Special Tests: (+) Active triggering. (-) PIPJ contracture. Normally sensate digit. Normally perfused digit.     Sensory: Positive Tinel's and median compression test on the right.  Sensation intact.      CC: Triggering of right ring and right thumb    HPI: 59 year old left hand female presents with triggering of right ring and right thumb. It started 3 ago. It has failed to improve/resolve. Pain level is moderate. Pain is described as locking. Patient has tried nothing yet.  Patient also has left wrist  pain following an injury where she was lifting up something heavy and felt a strain in her wrist.  She has been treating with her brace    (+) pain at A1 Pulley  (+) active triggering    Interval history: Patient MRI for continued wrist pain and is here for reevaluation.    Past Medical History:    Breast CA (HCC)    age 56    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    pt unaware    Hypothyroidism    Morbid obesity with BMI of 40.0-44.9, adult (HCC)    KIRTI on CPAP    Postmenopausal bleeding    embx negative     Past Surgical History:   Procedure Laterality Date      ,,,2001    x 4    Colonoscopy N/A 2022    Procedure: COLONOSCOPY;  Surgeon: Jeffrey Alas MD;  Location: [facility] ENDOSCOPY    Lumpectomy right Right 2021    Radiation right Right 10/2021     Current Outpatient Medications   Medication Sig Dispense Refill    letrozole 2.5 MG Oral Tab Take 1 tablet (2.5 mg total) by mouth daily. 90 tablet 3    levothyroxine 150 MCG Oral Tab Take 1 tablet (150 mcg total) by mouth before breakfast. 90 tablet 3    Flaxseed, Linseed, (FLAX SEEDS OR) Take by mouth.      acetaminophen 500 MG Oral Tab Take 1 tablet (500 mg total) by mouth every 6 (six) hours as needed for Pain or Fever.      Multiple Vitamins-Minerals (MULTI-VITAMIN/MINERALS) Oral Tab Take 1 tablet by mouth daily.      Ascorbic Acid (VITAMIN C OR) Take by mouth daily.      Cholecalciferol (VITAMIN D-3 OR) Take by mouth daily.       No Known Allergies  Family History   Problem Relation Age of Onset    Heart Disease Father     Melanoma Father 78    Cancer Mother 71        lung & brain also    Uterine Cancer Mother     Diabetes Mother     Glaucoma Mother     Cancer Maternal Grandmother         esophageal    Cancer Maternal Grandfather         stomach    Breast Cancer Self 58     Social History     Occupational History    Not on file   Tobacco Use    Smoking status: Former     Current packs/day: 0.00     Types: Cigarettes     Start  date: 1979     Quit date: 1979     Years since quittin.2     Passive exposure: Past    Smokeless tobacco: Never    Tobacco comments:     smoked age 14 for one week only   Vaping Use    Vaping status: Never Used   Substance and Sexual Activity    Alcohol use: Yes     Alcohol/week: 0.0 standard drinks of alcohol     Comment: Very infrequently; 1 glass of wine/4 months    Drug use: No    Sexual activity: Not on file        Review of Systems (negative unless bolded):  General: fevers, chills, fatigue  CV:  chest pain, palpitations, leg swelling  Msk: bodyaches, neck pain, neck stiffness  Skin: rashes, open wounds, nonhealing ulcers  Hem: bleeds easily, bruise easily, immunocompromised  Neuro: dizziness, light headedness, headaches  Psych: anxious, depressed, anger issues    Krystina Marie PA-C  Hand, Wrist, & Elbow Surgery  Physician Assistant to Dr. Shayne [email or web address]  t: [phone]  f: [phone]

## 2025-02-14 ENCOUNTER — OFFICE VISIT (OUTPATIENT)
Dept: OCCUPATIONAL MEDICINE | Age: 60
End: 2025-02-14
Attending: PHYSICIAN ASSISTANT
Payer: OTHER MISCELLANEOUS

## 2025-02-14 DIAGNOSIS — S63.502A SPRAIN OF LEFT WRIST, INITIAL ENCOUNTER: Primary | ICD-10-CM

## 2025-02-14 DIAGNOSIS — M19.039 WRIST ARTHRITIS: ICD-10-CM

## 2025-02-14 PROCEDURE — 97035 APP MDLTY 1+ULTRASOUND EA 15: CPT

## 2025-02-14 PROCEDURE — 97165 OT EVAL LOW COMPLEX 30 MIN: CPT

## 2025-02-14 PROCEDURE — 97110 THERAPEUTIC EXERCISES: CPT

## 2025-02-14 NOTE — PROGRESS NOTES
OT UE EVALUATION:     Diagnosis:   left wrist sprain, wrist arthritis Patient:  Tiana Abreu (59 year old, female)        Referring Provider: Krystina Marie  Today's Date   2025    Precautions:  Cancer   Date of Evaluation: 25  Next MD visit: 3/10/25  Date of Injury: 24  Date of Surgery: No data recorded     PATIENT SUMMARY   Summary of chief complaints: left wrist pain  History of current condition: lifting breakfast boxes of food, each about 20-30#, felt a \"Strain\". \"finished the day\"  Reports the next morning noted swelling in left wrist. Went to , issued a brace. Pain increased by Zenon, at which time she had decreased wearing brace to 2-3 days/wk.   Pain level: current 4 /10, at best 3 /10, at worst 7 /10  Description of symptoms: \"Sore\", but \"not always the same spot.\"   Occupation:    Occupational Roles: cook; parent   Leisure activities/Hobbies: cooking, shoveling   Prior level of function: mod ind  Current limitations: unloading , lifting, gripping objects, sweeping, shoveling, writing, use hand mixer, unpacking  Pt goals: relief of pain, cooking  Hand Dominance: left  Living Situation: family    Past medical history was reviewed with Tiana.  Significant findings include: lymphedema, breast CA on right  Imaging/Tests: MRI: Tendinosis and tenosynovitis of the extensor carpi ulnaris tendon.  No subluxation or dislocation. 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  She  has a past surgical history that includes  (,,,); radiation right (Right, 10/2021); lumpectomy right (Right, 2021); and colonoscopy (N/A, 2022).    ASSESSMENT  Tiana presents to occupational therapy evaluation with primary c/o left wrist pain. The  results of the objective tests and measures show  . Functional deficits include but are not limited to unloading , lifting, gripping objects, sweeping, shoveling, writing, use hand mixer, unpacking. Signs and symptoms are consistent with diagnosis of left wrist sprain, wrist arthritis. Pt and OT discussed evaluation findings, pathology, POC and HEP.  Pt voiced understanding and performs HEP correctly without reported pain. Skilled Occupational Therapy is medically necessary to address the above impairments and reach functional goals.      OBJECTIVE:   Musculoskeletal  Observation: Unremarkable rubbing left wrist       Orthotics: had pre-mary ann brace, wears intermittently, as recent as yesterday           Wrist   ROM MMT (-/5)    R L R L     Flex (C7) 65 60 (3/10 pain)  NT NT     Ext (C6) 75 65 (3/10 pain) NT NT     Ulnar Dev 45 40 NT NT     Radial Dev 30 30 (1/10 pain ECU) NT NT      (lbs) n/a 50 21, 35         Edema:  Circum Edema (cm) Wrist Crease     Right 15.9 cm      Left 15.5 cm        Neurological:  Sensory: WNL         ADLs/IADLs:  ADL's    Bathing: mod ind     Dressing: mod ind     Feeding: mod ind     Grooming: mod ind  IADL's     Homemaking: some difficulty sweeping     Food Prep: some difficulty     Driving: mod ind   Other Functional Mobility/ADL Comments: ind     Today's Treatment and Response:   Pt education was provided on exam findings, treatment diagnosis, treatment plan, expectations, and prognosis.  Today's Treatment       2/14/2025   OT Treatment   Therapeutic Exercise DTM: 15x  Reviewed AROM, anatomy of injury, exercises.  Applied tape for facilitation and support: Rock tape on radial/thumb to proximal points; and I strip in pisiform lift over.   Modalities Ultrasound:  3mHz  0.8w/cm2  100%  to dorsal left wrist  for 8 minutes.     Therapeutic Exercise Min 10   Ultrasound Min 8   Eval Min 27   Total Timed Procedures 18   Total Service Procedures 45   Total Time 45         Patient was  instructed in and issued a HEP for: Use of heat, DTM, tape removal with oil.     Charges:  OT EVAL Low Complexity, 1 TE, 1 US   Based on analysis of data from a problem-focused assessment from a brief chart review, clinical presentation of physical, cognitive and psychosocial skills, as well as review of patient rated outcome measures, this evaluation involved Low complexity decision making, with 1-3 occupational performance component deficits, no comorbidities, and no need for modification of tasks or assistance with assessment.                                                                                    PLAN OF CARE:    Goals: (to be met in 8 visits)   Goals       Therapy Goals      Not Met Progress Toward Partially Met Met   Patient will report no more than 1/10 pain during self-care skill performance. [] [] [] []   Patient will present with increase in left wrist extension to 75 and flexion to 65 degrees and/or increase CARTER of wrist in this plane by 15 degrees in order to allow for ease with wiping lou area. [] [] [] []   Patient will verbalize two concepts of joint protection for independent self management and ADL/IADL performance.   [] [] [] []   Patient will demonstrate independent postural awareness to provide proximal stability for distal upper limb movements during ADL's and IADL's. [] [] [] []   Patient will present with  strength in the left hand to that of 95% of the contralateral hand in order to be able to perform housework, lifting, carrying objects, pouring coffee pot. [] [] [] []   Additional goal to be established as indicated. [] [] [] []                   Frequency / Duration: Patient will be seen 2x/week or a total of 8 visits over a 90 day period. Treatment will include: Manual Therapy; Therapeutic Activities; Therapeutic Exercise; Neuromuscular Re-education; Ultrasound; Other (use comment) (Fluidotherapy, wrist proprioceptive re-ed, taping)    Education or treatment limitation:  None   Rehab Potential: good     QuickDASH Outcome Score  No data recorded --did not complete on MyChart; will need to administer next session.    Patient/Family/Caregiver was advised of these findings, precautions, and treatment options and has agreed to actively participate in planning and for this course of care.    BERNARDO Estrada/L [facility]  Physician's certification required: Yes  I certify the need for these services furnished under this plan of treatment and while under my care.    X___________________________________________________ Date____________________    Certification From: 2/14/2025  To:5/15/2025

## 2025-02-19 ENCOUNTER — OFFICE VISIT (OUTPATIENT)
Dept: OCCUPATIONAL MEDICINE | Age: 60
End: 2025-02-19
Attending: PHYSICIAN ASSISTANT
Payer: OTHER MISCELLANEOUS

## 2025-02-19 PROCEDURE — 97530 THERAPEUTIC ACTIVITIES: CPT

## 2025-02-19 PROCEDURE — 97035 APP MDLTY 1+ULTRASOUND EA 15: CPT

## 2025-02-19 PROCEDURE — 97112 NEUROMUSCULAR REEDUCATION: CPT

## 2025-02-19 PROCEDURE — 97110 THERAPEUTIC EXERCISES: CPT

## 2025-02-19 NOTE — PROGRESS NOTES
Patient: Tiana Abreu (59 year old, female) Referring Provider:  Insurance:   Diagnosis: left wrist sprain, wrist arthritis Krystina Frank  [employer]   Date of Surgery: No data recorded Next MD visit:  N/A   Precautions:  Cancer 3/10/25 Referral Information:   Date of Injury: 8/29/24 Date of Evaluation: Req: 8, Auth: 8, Exp: 5/10/2025    02/14/25 POC Auth Visits:  8       Today's Date   2/19/2025    Subjective  Reports tape relieved pain. C/o \"clicking\" in wrist with DTM after heating.  \"A little achy.\"       Pain: 3/10     Objective  See treatment performed today below.             Assessment  Slight reduction in pain and complaints noted. Most pain is on radial side with the OA. Some relief and support from the tape, but will have to monitor closely the skin from the adhesive.    Goals (to be met in 8 visits)   Goals       Therapy Goals      Not Met Progress Toward Partially Met Met   Patient will report no more than 1/10 pain during self-care skill performance. [] [x] [] []   Patient will present with increase in left wrist extension to 75 and flexion to 65 degrees and/or increase CARTER of wrist in this plane by 15 degrees in order to allow for ease with wiping lou area. [] [x] [] []   Patient will verbalize two concepts of joint protection for independent self management and ADL/IADL performance.   [] [] [] []   Patient will demonstrate independent postural awareness to provide proximal stability for distal upper limb movements during ADL's and IADL's. [] [x] [] []   Patient will present with  strength in the left hand to that of 95% of the contralateral hand in order to be able to perform housework, lifting, carrying objects, pouring coffee pot. [] [x] [] []   Additional goal to be established as indicated. [] [] [] []                   Plan  US, FT, laura, wrist proprioceptive re-ed    Treatment Last 4 Visits        2/14/2025 2/19/2025   OT Treatment   Treatment Day  2   Therapeutic Exercise DTM:  15x  Reviewed AROM, anatomy of injury, exercises.  Applied tape for facilitation and support: Rock tape on radial/thumb to proximal points; and I strip in pisiform lift over. Fluidotherapy for 10 minutes to left hand, with AROM x wrist performed.  DTM  Intrinsic strengthening: lumbrical gripper 1 of cotton ball mix and interossei/finger add/abd with same   Neuro Re-Educ  Wrist proprioceptive re-ed:   bilateral weighted ball  Ball roll on table, seated  Tennis ball on Frisbee, bilateral to unilateral  Wrist proprio board, seated then standing, 1 minute each  Pertubations of yellow flexbar: top and bottom   Therapeutic Activity  Wrist maze  Exerstick  Gyro stick     Additional Treatmnt  Rock tape applied in Thumb I strip distal to proximal and Pisiform lift over it.   Modalities Ultrasound:  3mHz  0.8w/cm2  100%  to dorsal left wrist  for 8 minutes.   Ultrasound:  3mHz  0.8w/cm2  100%  to left dorsal wrist  for 8 minutes.       Neuro Re-Ed Min  14   Therapeutic Exercise Min 10 15   Ther Activity Min  8   Ultrasound Min 8 8   Eval Min 27    Total Timed Procedures 18 45   Total Service Procedures 45 45   Total Time 45 45         HEP  Anatomy of injury  Tape removal in 1-2 days with oil  Issued soft putty for lumbrical  and interossei: 2-3xday, for 2 minutes each    Charges     1 TE, 1 NM, 1 TA, 1 US, tan putty issued    PAMELLA Estrada, OTR/L, CHT

## 2025-02-21 ENCOUNTER — OFFICE VISIT (OUTPATIENT)
Dept: OCCUPATIONAL MEDICINE | Age: 60
End: 2025-02-21
Attending: PHYSICIAN ASSISTANT
Payer: OTHER MISCELLANEOUS

## 2025-02-21 PROCEDURE — 97112 NEUROMUSCULAR REEDUCATION: CPT

## 2025-02-21 PROCEDURE — 97110 THERAPEUTIC EXERCISES: CPT

## 2025-02-21 PROCEDURE — 97530 THERAPEUTIC ACTIVITIES: CPT

## 2025-02-21 PROCEDURE — 97035 APP MDLTY 1+ULTRASOUND EA 15: CPT

## 2025-02-21 NOTE — PROGRESS NOTES
Patient: Tiana Abreu (59 year old, female) Referring Provider:  Insurance:   Diagnosis: left wrist sprain, wrist arthritis Krystina Frank  [employer]   Date of Surgery: No data recorded Next MD visit:  N/A   Precautions:  Cancer 3/10/25 Referral Information:   Date of Injury: 8/29/24 Date of Evaluation: Req: 8, Auth: 8, Exp: 5/10/2025    02/14/25 POC Auth Visits:  8       Today's Date   2/21/2025    Subjective  \"It's so many different sensations.\"  C/o \"strain\" in volar FA with putting head in hand.       Pain: 3/10     Objective  See treatment performed today below.             Assessment  Generalized sprain/strain complaints noted in the right hand and FA.  No obvious neural pattern yet appreciated.    Goals (to be met in 8 visits)   Goals       Therapy Goals      Not Met Progress Toward Partially Met Met   Patient will report no more than 1/10 pain during self-care skill performance. [] [x] [] []   Patient will present with increase in left wrist extension to 75 and flexion to 65 degrees and/or increase CARTER of wrist in this plane by 15 degrees in order to allow for ease with wiping lou area. [] [x] [] []   Patient will verbalize two concepts of joint protection for independent self management and ADL/IADL performance.   [] [] [] []   Patient will demonstrate independent postural awareness to provide proximal stability for distal upper limb movements during ADL's and IADL's. [] [x] [] []   Patient will present with  strength in the left hand to that of 95% of the contralateral hand in order to be able to perform housework, lifting, carrying objects, pouring coffee pot. [] [x] [] []   Additional goal to be established as indicated. [] [] [] []                   Plan  US, FT, taping, wrist proprioceptive re-ed    Treatment Last 4 Visits        2/14/2025 2/19/2025 2/21/2025   OT Treatment   Treatment Day  2 3   Therapeutic Exercise DTM: 15x  Reviewed AROM, anatomy of injury, exercises.  Applied tape for  facilitation and support: Rock tape on radial/thumb to proximal points; and I strip in pisiform lift over. Fluidotherapy for 10 minutes to left hand, with AROM x wrist performed.  DTM  Intrinsic strengthening: lumbrical gripper 1 of cotton ball mix and interossei/finger add/abd with same Fluidotherapy for 10 minutes to left hand, with AROM x wrist and FA performed.  Intrinsic strengthening: cotton ball mix with lumbrical gripper 1 and interossei.   Neuro Re-Educ  Wrist proprioceptive re-ed:   bilateral weighted ball  Ball roll on table, seated  Tennis ball on Frisbee, bilateral to unilateral  Wrist proprio board, seated then standing, 1 minute each  Pertubations of yellow flexbar: top and bottom Wrist proprioceptive re-ed:  Bilateral ball (0.5kg)  Pertubations of red flexbar both top and bottom  Bilateral to unilateral golf ball on game tray   Therapeutic Activity  Wrist maze  Exerstick  Gyro stick   Wrist maze  Gyro stick  Dowel writing: cursive alphabet letters and figure 8's   Additional Treatmnt  Rock tape applied in Thumb I strip distal to proximal and Pisiform lift over it. Rock tape applied in Thumb distal to proximal pull and I strip in pisiform lift.   Modalities Ultrasound:  3mHz  0.8w/cm2  100%  to dorsal left wrist  for 8 minutes.   Ultrasound:  3mHz  0.8w/cm2  100%  to left dorsal wrist  for 8 minutes.     Ultrasound:  3mHz  0.8w/cm2  100%  to dorsal left wrist  for 4 minutes and volar radial FA for 4 minutes.     Neuro Re-Ed Min  14 10   Therapeutic Exercise Min 10 15 15   Ther Activity Min  8 12   Ultrasound Min 8 8 8   Eval Min 27     Total Timed Procedures 18 45 45   Total Service Procedures 45 45 45   Total Time 45 45 45         HEP  Relative rest concept reviewed.  Use of heat.  Tape removal with oil.    Charges     1 TE, 1 TA, 1 NM, 1 US      Laurel Poon, PAMELLA, OTR/L, CHT

## 2025-02-25 ENCOUNTER — TELEPHONE (OUTPATIENT)
Dept: FAMILY MEDICINE CLINIC | Facility: CLINIC | Age: 60
End: 2025-02-25

## 2025-02-25 ENCOUNTER — OFFICE VISIT (OUTPATIENT)
Dept: OCCUPATIONAL MEDICINE | Age: 60
End: 2025-02-25
Attending: PHYSICIAN ASSISTANT
Payer: OTHER MISCELLANEOUS

## 2025-02-25 PROCEDURE — 97112 NEUROMUSCULAR REEDUCATION: CPT

## 2025-02-25 PROCEDURE — 97035 APP MDLTY 1+ULTRASOUND EA 15: CPT

## 2025-02-25 PROCEDURE — 97110 THERAPEUTIC EXERCISES: CPT

## 2025-02-25 NOTE — PROGRESS NOTES
Patient: Tiana Abreu (59 year old, female) Referring Provider:  Insurance:   Diagnosis: left wrist sprain, wrist arthritis Krystina Frank  [employer]   Date of Surgery: No data recorded Next MD visit:  N/A   Precautions:  Cancer 3/10/25 Referral Information:   Date of Injury: 8/29/24 Date of Evaluation: Req: 8, Auth: 8, Exp: 5/10/2025    02/14/25 POC Auth Visits:  8       Today's Date   2/25/2025    Subjective  Reports increase in pain after much writing yesterday. Also notes pain in volar radial surface with pouring cereal box.       Pain:    \"not constant\"  4/10     Objective  See treatment performed today below.             Assessment  Overall no change in complaints, which are intermittent in nature and varying.    Goals (to be met in 8 visits)   Goals       Therapy Goals      Not Met Progress Toward Partially Met Met   Patient will report no more than 1/10 pain during self-care skill performance. [] [x] [] []   Patient will present with increase in left wrist extension to 75 and flexion to 65 degrees and/or increase CARTER of wrist in this plane by 15 degrees in order to allow for ease with wiping lou area. [] [x] [] []   Patient will verbalize two concepts of joint protection for independent self management and ADL/IADL performance.   [] [] [] []   Patient will demonstrate independent postural awareness to provide proximal stability for distal upper limb movements during ADL's and IADL's. [] [x] [] []   Patient will present with  strength in the left hand to that of 95% of the contralateral hand in order to be able to perform housework, lifting, carrying objects, pouring coffee pot. [] [x] [] []   Additional goal to be established as indicated. [] [] [] []                   Plan  US, FT, taping, wrist proprioceptive re-ed    Treatment Last 4 Visits        2/14/2025 2/19/2025 2/21/2025 2/25/2025   OT Treatment   Treatment Day  2 3 4   Therapeutic Exercise DTM: 15x  Reviewed AROM, anatomy of injury,  exercises.  Applied tape for facilitation and support: Rock tape on radial/thumb to proximal points; and I strip in pisiform lift over. Fluidotherapy for 10 minutes to left hand, with AROM x wrist performed.  DTM  Intrinsic strengthening: lumbrical gripper 1 of cotton ball mix and interossei/finger add/abd with same Fluidotherapy for 10 minutes to left hand, with AROM x wrist and FA performed.  Intrinsic strengthening: cotton ball mix with lumbrical gripper 1 and interossei. Fluidotherapy for 10 minutes to left hand, with AROM x wrist performed.  Wall washes, wall clocks:  2 minutes time/reps.  Position: standing.  Intrinsic strengthening: interossei putty/foam; lumbricals: with lumbrical gripper 1: 2 rounds.  PNF diagonals: Door dusting         Neuro Re-Educ  Wrist proprioceptive re-ed:   bilateral weighted ball  Ball roll on table, seated  Tennis ball on Frisbee, bilateral to unilateral  Wrist proprio board, seated then standing, 1 minute each  Pertubations of yellow flexbar: top and bottom Wrist proprioceptive re-ed:  Bilateral ball (0.5kg)  Pertubations of red flexbar both top and bottom  Bilateral to unilateral golf ball on game tray Wrist proprioceptive re-education: hand bosu board, tennis ball on Frisbee, pertubations of red power web and ref flexbar;  bilateral 1.0 kg weighted ball movements; ball roll on table for weight bearing.     Therapeutic Activity  Wrist maze  Exerstick  Gyro stick   Wrist maze  Gyro stick  Dowel writing: cursive alphabet letters and figure 8's    Additional Treatmnt  Rock tape applied in Thumb I strip distal to proximal and Pisiform lift over it. Rock tape applied in Thumb distal to proximal pull and I strip in pisiform lift. Rock tape applied in split, volar-radial to dorsum of wrist, split over ECU tendon.   Modalities Ultrasound:  3mHz  0.8w/cm2  100%  to dorsal left wrist  for 8 minutes.   Ultrasound:  3mHz  0.8w/cm2  100%  to left dorsal wrist  for 8 minutes.      Ultrasound:  3mHz  0.8w/cm2  100%  to dorsal left wrist  for 4 minutes and volar radial FA for 4 minutes.   Ultrasound:   3mHz   0.8w/cm2   100%   to dorsal left wrist  for 4 minutes and volar radial FA for 4 minutes.      Neuro Re-Ed Min  14 10 15   Therapeutic Exercise Min 10 15 15 22   Ther Activity Min  8 12    Ultrasound Min 8 8 8 8   Eval Min 27      Total Timed Procedures 18 45 45 45   Total Service Procedures 45 45 45 45   Total Time 45 45 45 45         HEP  Tape removal in 1-2 days  Anatomy of injury    Charges     1 US, 1 TE, 1 NM    Laurel Poon, KATHERINES, OTR/L, CHT

## 2025-03-04 ENCOUNTER — OFFICE VISIT (OUTPATIENT)
Dept: OCCUPATIONAL MEDICINE | Age: 60
End: 2025-03-04
Attending: PHYSICIAN ASSISTANT
Payer: OTHER MISCELLANEOUS

## 2025-03-04 PROCEDURE — 97110 THERAPEUTIC EXERCISES: CPT

## 2025-03-04 PROCEDURE — 97035 APP MDLTY 1+ULTRASOUND EA 15: CPT

## 2025-03-04 NOTE — PROGRESS NOTES
Patient: Tiana Abreu (60 year old, female) Referring Provider:  Insurance:   Diagnosis: left wrist sprain, wrist arthritis Krystina Frank  [employer]   Date of Surgery: No data recorded Next MD visit:  N/A   Precautions:  Cancer 3/10/25 Referral Information:   Date of Injury: 8/29/24 Date of Evaluation: Req: 8, Auth: 8, Exp: 5/10/2025    02/14/25 POC Auth Visits:  8       Today's Date   3/4/2025    Subjective  \"i haven't really done much, so pain has been parris.\"  \"i've had odd of \"round spot of pain\" 5 or 6 -pointing to radial styloid.\"  \"I think I grasp the steering wheel too strong.\"       Pain: 2/10     Objective  See treatment performed today below.             Assessment  Patient with varying hand and wrist complaints, but new report of \"tingling\" in palm with wrist PRE's. Will need to monitor for recurrence.    Goals (to be met in 8 visits)   Therapy Goals          Not Met Progress Toward Partially Met Met   Patient will report no more than 1/10 pain during self-care skill performance. []  [x]  []  []    Patient will present with increase in left wrist extension to 75 and flexion to 65 degrees and/or increase CARTER of wrist in this plane by 15 degrees in order to allow for ease with wiping lou area. []  [x]  []  []    Patient will verbalize two concepts of joint protection for independent self management and ADL/IADL performance.    []  []  []  []    Patient will demonstrate independent postural awareness to provide proximal stability for distal upper limb movements during ADL's and IADL's. []  [x]  []  []    Patient will present with  strength in the left hand to that of 95% of the contralateral hand in order to be able to perform housework, lifting, carrying objects, pouring coffee pot. []  [x]  []  []    Additional goal to be established as indicated. []  []  []  []         Plan  US, FT, taping, wrist proprioceptive re-ed    Treatment Last 4 Visits        2/21/2025 2/25/2025 2/28/2025 3/4/2025    OT Treatment   Treatment Day 3 4 5 5   Therapeutic Exercise Fluidotherapy for 10 minutes to left hand, with AROM x wrist and FA performed.  Intrinsic strengthening: cotton ball mix with lumbrical gripper 1 and interossei. Fluidotherapy for 10 minutes to left hand, with AROM x wrist performed.  Wall washes, wall clocks:  2 minutes time/reps.  Position: standing.  Intrinsic strengthening: interossei putty/foam; lumbricals: with lumbrical gripper 1: 2 rounds.  PNF diagonals: Door dusting        Fluidotherapy for 10 minutes to left hand, with AROM x wrist performed.   Wall washes, wall clocks:  2 minutes time/reps.  Position: standing.   Intrinsic strengthening: lumbricals: with lumbrical gripper 1: 2 rounds.   PNF diagonals: Door dusting   Red power web stretches into both wrist flexion and extension  Wrist PRE's 3 sets of 10, 1# (tingling in palm)     Neuro Re-Educ Wrist proprioceptive re-ed:  Bilateral ball (0.5kg)  Pertubations of red flexbar both top and bottom  Bilateral to unilateral golf ball on game tray Wrist proprioceptive re-education: hand bosu board, tennis ball on Frisbee, pertubations of red power web and ref flexbar;  bilateral 1.0 kg weighted ball movements; ball roll on table for weight bearing.       Therapeutic Activity Wrist maze  Gyro stick  Dowel writing: cursive alphabet letters and figure 8's      Additional Treatmnt Rock tape applied in Thumb distal to proximal pull and I strip in pisiform lift. Rock tape applied in split, volar-radial to dorsum of wrist, split over ECU tendon.     Modalities Ultrasound:  3mHz  0.8w/cm2  100%  to dorsal left wrist  for 4 minutes and volar radial FA for 4 minutes.   Ultrasound:   3mHz   0.8w/cm2   100%   to dorsal left wrist  for 4 minutes and volar radial FA for 4 minutes.     Ultrasound:  3mHz  1.0w/cm2  100%  to dorsal wrist  for 4 minutes and volar to 4 minutes.    Rock tape applied: pisiform lift, and I strip on ulnar FA, distal to proximal pull.   Neuro  Re-Ed Min 10 15     Therapeutic Exercise Min 15 22  37   Ther Activity Min 12      Ultrasound Min 8 8  8   Total Timed Procedures 45 45  45   Total Service Procedures 45 45  45   Total Time 45 45  45         HEP  Continue with current HEP.  Resume theraband with knots for holding    Charges     1 US, 2 TE      KATHERINE EstradaS, OTR/L, CHT

## 2025-03-07 ENCOUNTER — OFFICE VISIT (OUTPATIENT)
Dept: OCCUPATIONAL MEDICINE | Age: 60
End: 2025-03-07
Attending: PHYSICIAN ASSISTANT
Payer: OTHER MISCELLANEOUS

## 2025-03-07 PROCEDURE — 97035 APP MDLTY 1+ULTRASOUND EA 15: CPT

## 2025-03-07 PROCEDURE — 97110 THERAPEUTIC EXERCISES: CPT

## 2025-03-07 NOTE — PROGRESS NOTES
Patient: Tiana Abreu (60 year old, female) Referring Provider:  Insurance:   Diagnosis: left wrist sprain, wrist arthritis Krystina Frank  [employer]   Date of Surgery: No data recorded Next MD visit:  N/A   Precautions:  Cancer 3/10/25 Referral Information:   Date of Injury: 8/29/24 Date of Evaluation: Req: 8, Auth: 8, Exp: 5/10/2025    02/14/25 POC Auth Visits:  8       Today's Date   3/7/2025    Subjective  \"after I took the tape off yesterday it felt great all day long and I tried not to do much; than after work it hurt on the lunch.\"  Reports pickng up a heavy thick book and with pain; reports wide cups also hard to .  During US, after supinating hand, pt reported \"numbness\" in thumb, IF and MF, that was transient.       Pain: 4/10     Objective  See treatment performed today below.             Assessment  Patient presents with possible median nerve irritation.  This may be an underlying culprit to her radiating pain both into the palm and up the volar forearm, in addition to the arthritis pain noted previously. This may be the reason for slow progress in therapy.  Issued Median nerve glides and encouraged her to report this to MD at appt on Monday.    Goals (to be met in 8 visits)   Therapy Goals          Not Met Progress Toward Partially Met Met   Patient will report no more than 1/10 pain during self-care skill performance. []  [x]  []  []    Patient will present with increase in left wrist extension to 75 and flexion to 65 degrees and/or increase CARTER of wrist in this plane by 15 degrees in order to allow for ease with wiping lou area. []  [x]  []  []    Patient will verbalize two concepts of joint protection for independent self management and ADL/IADL performance.    []  []  []  []    Patient will demonstrate independent postural awareness to provide proximal stability for distal upper limb movements during ADL's and IADL's. []  [x]  []  []    Patient will present with  strength in the  left hand to that of 95% of the contralateral hand in order to be able to perform housework, lifting, carrying objects, pouring coffee pot. []  [x]  []  []    Additional goal to be established as indicated. []  []  []  []             Plan  US, FT, taping, wrist proprioceptive re-ed    Treatment Last 4 Visits        2/25/2025 2/28/2025 3/4/2025 3/7/2025   OT Treatment   Treatment Day 4 5 5 6   Therapeutic Exercise Fluidotherapy for 10 minutes to left hand, with AROM x wrist performed.  Wall washes, wall clocks:  2 minutes time/reps.  Position: standing.  Intrinsic strengthening: interossei putty/foam; lumbricals: with lumbrical gripper 1: 2 rounds.  PNF diagonals: Door dusting        Fluidotherapy for 10 minutes to left hand, with AROM x wrist performed.   Wall washes, wall clocks:  2 minutes time/reps.  Position: standing.   Intrinsic strengthening: lumbricals: with lumbrical gripper 1: 2 rounds.   PNF diagonals: Door dusting   Red power web stretches into both wrist flexion and extension  Wrist PRE's 3 sets of 10, 1# (tingling in palm)   Fluidotherapy for 10 minutes to left hand, with AROM x digits and wrist performed.  Median nerve glides, finger add/abd  Tendon glides     Neuro Re-Educ Wrist proprioceptive re-education: hand bosu board, tennis ball on Frisbee, pertubations of red power web and ref flexbar;  bilateral 1.0 kg weighted ball movements; ball roll on table for weight bearing.        Additional Treatmnt Rock tape applied in split, volar-radial to dorsum of wrist, split over ECU tendon.      Modalities Ultrasound:   3mHz   0.8w/cm2   100%   to dorsal left wrist  for 4 minutes and volar radial FA for 4 minutes.     Ultrasound:  3mHz  1.0w/cm2  100%  to dorsal wrist  for 4 minutes and volar to 4 minutes.    Rock tape applied: pisiform lift, and I strip on ulnar FA, distal to proximal pull. Ultrasound:   3mHz   1.0w/cm2   100%   to dorsal wrist  for 4 minutes and volar to 4 minutes.          Neuro Re-Ed Min  15      Therapeutic Exercise Min 22  37 37   Ultrasound Min 8  8 8   Total Timed Procedures 45  45 45   Total Service Procedures 45  45 45   Total Time 45  45 45         HEP  Median nerve glides,   Finger add/abd  Tendon glides  Contrast baths    Charges     1 US, 2 TE      PAMELLA Estrada, OTR/L, CHT

## 2025-03-10 ENCOUNTER — OFFICE VISIT (OUTPATIENT)
Dept: ORTHOPEDICS CLINIC | Facility: CLINIC | Age: 60
End: 2025-03-10

## 2025-03-10 VITALS — BODY MASS INDEX: 39.65 KG/M2 | WEIGHT: 210 LBS | HEIGHT: 61 IN

## 2025-03-10 DIAGNOSIS — G56.02 CARPAL TUNNEL SYNDROME OF LEFT WRIST: Primary | ICD-10-CM

## 2025-03-10 DIAGNOSIS — M65.4 TENOSYNOVITIS OF RADIAL STYLOID: ICD-10-CM

## 2025-03-10 RX ORDER — BETAMETHASONE SODIUM PHOSPHATE AND BETAMETHASONE ACETATE 3; 3 MG/ML; MG/ML
6 INJECTION, SUSPENSION INTRA-ARTICULAR; INTRALESIONAL; INTRAMUSCULAR; SOFT TISSUE ONCE
Status: COMPLETED | OUTPATIENT
Start: 2025-03-10 | End: 2025-03-10

## 2025-03-10 RX ADMIN — BETAMETHASONE SODIUM PHOSPHATE AND BETAMETHASONE ACETATE 6 MG: 3; 3 INJECTION, SUSPENSION INTRA-ARTICULAR; INTRALESIONAL; INTRAMUSCULAR; SOFT TISSUE at 11:38:00

## 2025-03-10 NOTE — PROGRESS NOTES
Clinic Note      Assessment/Plan:  60 year old with   Triggering of right ring and right thumb.  Currently asymptomatic status post 2 injections.  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  There is also a ganglion cyst emanating off the radiocarpal joint that is likely the source of some of her pain.  We can consider a corticosteroid injection to the radiocarpal joint to see if it does not provide any pain relief for the joint pain that she is dealing with.  Left hand numbness and tingling possible carpal tunnel syndrome-obtain EMG/NCV.  MRI is suggestive of median nerve compression  Left de Quervain's tenosynovitis-we will try a therapeutic/diagnostic steroid injection.    Follow-up: After EMG/NCV    Diagnostic:  EMG/NCV: Pending    Physical Exam:    Ht 5' 1\" (1.549 m)   Wt 210 lb (95.3 kg)   LMP 04/22/2017   BMI 39.68 kg/m²     Constitutional: NAD. AOx3. Well-developed and Well-nourished.   Psychiatric: Normal mood/ affect/ behavior. Judgment and thought content normal.     Bilateral upper Extremity:   Inspection: Skin Intact. No skin lesions. No gross deformity.   Palpation:  (+) TTP over first dorsal compartment, over the volar radiocarpal joint just radial to the FCR tendon.  No pain over the ECU   Motion: Elbow: normal bilateral symmetric ext/flex  Wrist: normal bilateral symmetric ext/flex/sup/pro  Finger: full composite fist,    Special Tests: (-) Active triggering. (-) PIPJ contracture. Normally sensate digit. Normally perfused digit.  Negative ECU Synergy test     Sensory: Positive Tinel's and median compression test on the left.  Sensation intact.    CC: Triggering of right ring and right thumb    HPI: 60 year old left hand female presents with triggering of right ring and right thumb. It started 3 ago. It has failed to improve/resolve. Pain level is moderate. Pain is described as locking. Patient has tried nothing yet.  Patient also  has left wrist pain following an injury where she was lifting up something heavy and felt a strain in her wrist.  She has been treating with her brace    (+) pain at A1 Pulley  (+) active triggering    Interval Hx (3/10/2025): Patient notes ongoing weakness and intermittent numbness and tingling.  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 90 tablet 3    levothyroxine 150 MCG Oral Tab Take 1 tablet (150 mcg total) by mouth before breakfast. 90 tablet 3    Flaxseed, Linseed, (FLAX SEEDS OR) Take by mouth.      acetaminophen 500 MG Oral Tab Take 1 tablet (500 mg total) by mouth every 6 (six) hours as needed for Pain or Fever.      Multiple Vitamins-Minerals (MULTI-VITAMIN/MINERALS) Oral Tab Take 1 tablet by mouth daily.      Ascorbic Acid (VITAMIN C OR) Take by mouth daily.      Cholecalciferol (VITAMIN D-3 OR) Take by mouth daily.       No Known Allergies  Family History   Problem Relation Age of Onset    Heart Disease Father     Melanoma Father 78    Cancer Mother 71        lung & brain also    Uterine Cancer Mother     Diabetes Mother     Glaucoma Mother     Cancer Maternal Grandmother         esophageal    Cancer Maternal Grandfather         stomach    Breast Cancer Self 56     Social  History     Occupational History    Not on file   Tobacco Use    Smoking status: Former     Current packs/day: 0.00     Types: Cigarettes     Start date: 1979     Quit date: 1979     Years since quittin.3     Passive exposure: Past    Smokeless tobacco: Never    Tobacco comments:     smoked age 14 for one week only   Vaping Use    Vaping status: Never Used   Substance and Sexual Activity    Alcohol use: Yes     Alcohol/week: 0.0 standard drinks of alcohol     Comment: Very infrequently; 1 glass of wine/4 months    Drug use: No    Sexual activity: Not on file        Review of Systems (negative unless bolded):  General: fevers, chills, fatigue  CV:  chest pain, palpitations, leg swelling  Msk: bodyaches, neck pain, neck stiffness  Skin: rashes, open wounds, nonhealing ulcers  Hem: bleeds easily, bruise easily, immunocompromised  Neuro: dizziness, light headedness, headaches  Psych: anxious, depressed, anger issues    Shayne Piña MD   Hand, Wrist, & Elbow Surgery  [email or web address]  t: [phone]  f: [phone]

## 2025-03-11 ENCOUNTER — OFFICE VISIT (OUTPATIENT)
Dept: OCCUPATIONAL MEDICINE | Age: 60
End: 2025-03-11
Attending: PHYSICIAN ASSISTANT
Payer: OTHER MISCELLANEOUS

## 2025-03-11 PROCEDURE — 97112 NEUROMUSCULAR REEDUCATION: CPT

## 2025-03-11 PROCEDURE — 97035 APP MDLTY 1+ULTRASOUND EA 15: CPT

## 2025-03-11 PROCEDURE — 97110 THERAPEUTIC EXERCISES: CPT

## 2025-03-11 NOTE — PROGRESS NOTES
Patient: Tiana Abreu (60 year old, female) Referring Provider:  Insurance:   Diagnosis: left wrist sprain, wrist arthritis Krystina Frank  [employer]   Date of Surgery: No data recorded Next MD visit:  N/A   Precautions:  Cancer 3/10/25 Referral Information:   Date of Injury: 8/29/24 Date of Evaluation: Req: 8, Auth: 8, Exp: 5/10/2025    02/14/25 POC Auth Visits:  8       Today's Date   3/11/2025    Subjective  \"i had the injection but I'm questioning why.\"       Pain: 3/10     Objective  See treatment performed today below.             Assessment  Patient continues with multiple layers of pain, from nerve, tendinitis, and arthritis, as well as noted cyst.  These limit her progress, but will offer one more session per POC and reassess.    Goals (to be met in 8 visits)   Therapy Goals          Not Met Progress Toward Partially Met Met   Patient will report no more than 1/10 pain during self-care skill performance. []  [x]  []  []    Patient will present with increase in left wrist extension to 75 and flexion to 65 degrees and/or increase CARETR of wrist in this plane by 15 degrees in order to allow for ease with wiping lou area. []  [x]  []  []    Patient will verbalize two concepts of joint protection for independent self management and ADL/IADL performance.    []  []  []  []    Patient will demonstrate independent postural awareness to provide proximal stability for distal upper limb movements during ADL's and IADL's. []  [x]  []  []    Patient will present with  strength in the left hand to that of 95% of the contralateral hand in order to be able to perform housework, lifting, carrying objects, pouring coffee pot. []  [x]  []  []    Additional goal to be established as indicated. []  []  []  []                 Plan  US, FT, taping, wrist proprioceptive re-ed    Treatment Last 4 Visits        2/28/2025 3/4/2025 3/7/2025 3/11/2025   OT Treatment   Treatment Day 5 5 6 7   Therapeutic Exercise   Fluidotherapy for 10 minutes to left hand, with AROM x wrist performed.   Wall washes, wall clocks:  2 minutes time/reps.  Position: standing.   Intrinsic strengthening: lumbricals: with lumbrical gripper 1: 2 rounds.   PNF diagonals: Door dusting   Red power web stretches into both wrist flexion and extension  Wrist PRE's 3 sets of 10, 1# (tingling in palm)   Fluidotherapy for 10 minutes to left hand, with AROM x digits and wrist performed.  Median nerve glides, finger add/abd  Tendon glides   Fluidotherapy for 10 minutes to left hand, with AROM x wrist, thumb, digits performed.  Median nerve glides.  Wrist PRE's 3 sets of 15, 1#       Neuro Re-Educ    Pertubations of yellow flexbar  Tennis ball on frisbee, unilateral  True Balance   Modalities  Ultrasound:  3mHz  1.0w/cm2  100%  to dorsal wrist  for 4 minutes and volar to 4 minutes.    Rock tape applied: pisiform lift, and I strip on ulnar FA, distal to proximal pull. Ultrasound:   3mHz   1.0w/cm2   100%   to dorsal wrist  for 4 minutes and volar to 4 minutes.        Ultrasound:  3mHz  0.8w/cm2  100%  to volar wrist  for 4 minutes and dorsal wrist for 4 minutes.     Neuro Re-Ed Min    10   Therapeutic Exercise Min  37 37 27   Ultrasound Min  8 8 8   Total Timed Procedures  45 45 45   Total Service Procedures  45 45 45   Total Time  45 45 45         HEP  Anatomy of injury  Avoid excessive, forceful gripping.    Charges     1 US, 1 NM, 2 TE      Laurel Poon, KATHERINES, OTR/L, CHT

## 2025-03-21 ENCOUNTER — OFFICE VISIT (OUTPATIENT)
Dept: OCCUPATIONAL MEDICINE | Age: 60
End: 2025-03-21
Attending: PHYSICIAN ASSISTANT
Payer: OTHER MISCELLANEOUS

## 2025-03-21 PROCEDURE — 97110 THERAPEUTIC EXERCISES: CPT

## 2025-03-21 PROCEDURE — 97530 THERAPEUTIC ACTIVITIES: CPT

## 2025-03-21 PROCEDURE — 97035 APP MDLTY 1+ULTRASOUND EA 15: CPT

## 2025-03-21 NOTE — PROGRESS NOTES
Progress Summary  Pt has attended 8 visits in Occupational Therapy.     Patient: Tiana Abreu (60 year old, female) Referring Provider:  Insurance:   Diagnosis: left wrist sprain, wrist arthritis Krystina Frank  [employer]   Date of Surgery: No data recorded Next MD visit:  N/A   Precautions:  Cancer 4/21/25 Referral Information:   Date of Injury: 8/29/24 Date of Evaluation: Req: 8, Auth: 8, Exp: 5/10/2025    02/14/25 POC Auth Visits:  8       Today's Date   3/21/2025    Subjective  \"I feel a little tight around the wrist.\"  \"I started squeezing the putty.\"       Pain: 2/10     Objective  See detail below.    Wrist       2/14/2025 3/21/2025   Wrist ROM/MMT   Rt Wrist Flex (C7) 65    Lt Wrist Flex (C7) 60       3/10 pain 60   Rt Wrist ext (C6) 75    Lt Wrist ext (C6) 65       3/10 pain 60   Rt Wrist Ulnar Deviation 45    Lt Wrist Ulnar Deviation 40 30   Rt Wrist Radial Deviation 30    Lt Wrist Radial Deviation 30       1/10 pain ECU 25   Rt  Strength lbs 50    Lt  Strength lbs 21, 35 46    Lt Hand   Hand Strength       3/21/2025   Hand Strength    Lt 46   3 Pt Pinch Lt 9   Lateral Pinch Lt 9.5              Assessment     Patient is a 59 yo female, who has been seen in Occupational Therapy since initial eval on 2/14/25, with last treatment session on 3/21/25.  The patient has attended 8 scheduled appointments, with 0 no shows and 0 cancellations.  Focus of skilled OT has been on ROM, soft tissue flexibility, pain, and strength, with use of interventions including therapeutic activities, therapeutic exercises, modalities such as US and FT, manual therapy, adapted ADL training,  neuromuscular re-ed, and taping to achieve the functional goals listed below. She has shown improvement in Strength and pain, with functional improvements reported as in opening a jar, carrying objects, and in daily occupation engagement.   Limitations and barriers toward progress include: chronicity of arthritis pain.  She  has met the goals as noted below, reaching maximal benefit from skilled O at this time and will be put on hold pending EMG/NCV, f/u with MD and further direction.      Goals (to be met in 8 visits)   Therapy Goals          Not Met Progress Toward Partially Met Met   Patient will report no more than 1/10 pain during self-care skill performance. []  []  []  [x]    Patient will present with increase in left wrist extension to 75 and flexion to 65 degrees and/or increase CARTER of wrist in this plane by 15 degrees in order to allow for ease with wiping lou area. []  []  [x]  []    Patient will verbalize two concepts of joint protection for independent self management and ADL/IADL performance.    []  []  [x]  []    Patient will demonstrate independent postural awareness to provide proximal stability for distal upper limb movements during ADL's and IADL's. []  [x]  []  []    Patient will present with  strength in the left hand to that of 95% of the contralateral hand in order to be able to perform housework, lifting, carrying objects, pouring coffee pot. []  []  [x] 92% []    Additional goal to be established as indicated. []  []  []  []            Post QuickDASH Outcome Score  Post Score: (Patient-Rptd) 40.91 % (3/21/2025  9:31 AM)    -40.91 % improvement         Plan  Hold OT    Treatment Last 4 Visits        3/4/2025 3/7/2025 3/11/2025 3/21/2025   OT Treatment   Treatment Day 5 6 7 8   Therapeutic Exercise Fluidotherapy for 10 minutes to left hand, with AROM x wrist performed.   Wall washes, wall clocks:  2 minutes time/reps.  Position: standing.   Intrinsic strengthening: lumbricals: with lumbrical gripper 1: 2 rounds.   PNF diagonals: Door dusting   Red power web stretches into both wrist flexion and extension  Wrist PRE's 3 sets of 10, 1# (tingling in palm)   Fluidotherapy for 10 minutes to left hand, with AROM x digits and wrist performed.  Median nerve glides, finger add/abd  Tendon glides   Fluidotherapy for 10  minutes to left hand, with AROM x wrist, thumb, digits performed.  Median nerve glides.  Wrist PRE's 3 sets of 15, 1#     Intrinsic strengthening: lumbricals: with lumbrical gripper 1 with yellow sponge insert: 2 rounds.   Red power web stretches into both wrist flexion and extension   Wrist PRE's 3 sets of 15, 1#      Neuro Re-Educ   Pertubations of yellow flexbar  Tennis ball on frisbee, unilateral  True Balance    Therapeutic Activity    Flexbar: yellow pertubations  Bulk putty tan    Modalities Ultrasound:  3mHz  1.0w/cm2  100%  to dorsal wrist  for 4 minutes and volar to 4 minutes.    Rock tape applied: pisiform lift, and I strip on ulnar FA, distal to proximal pull. Ultrasound:   3mHz   1.0w/cm2   100%   to dorsal wrist  for 4 minutes and volar to 4 minutes.        Ultrasound:  3mHz  0.8w/cm2  100%  to volar wrist  for 4 minutes and dorsal wrist for 4 minutes.   Ultrasound:   3mHz   0.8w/cm2   100%   to volar wrist  for 4 minutes and dorsal wrist for 4 minutes.      Neuro Re-Ed Min   10    Therapeutic Exercise Min 37 37 27 15   Ther Activity Min    22   Ultrasound Min 8 8 8 8   Total Timed Procedures 45 45 45 45   Total Service Procedures 45 45 45 45   Total Time 45 45 45 45         HEP  OT POC and self management while on hold for OT; continue with contrast baths at night.    Charges     1 TE, 1 TA, 1 US      PAMELLA Estrada, OTR/L, CHT

## 2025-04-04 ENCOUNTER — TELEPHONE (OUTPATIENT)
Facility: CLINIC | Age: 60
End: 2025-04-04

## 2025-04-04 NOTE — TELEPHONE ENCOUNTER
Patient outreach message received:    Last colonoscopy done 7/18/22. 3 year recall placed into Pt Outreach, next due on 7/18/25 per Dr. Alsa.     Recall reminder letter sent out to patient via [employer].

## 2025-04-12 RX ORDER — LEVOTHYROXINE SODIUM 150 UG/1
150 TABLET ORAL
Qty: 90 TABLET | Refills: 3 | Status: SHIPPED | OUTPATIENT
Start: 2025-04-12

## 2025-04-16 ENCOUNTER — PROCEDURE VISIT (OUTPATIENT)
Dept: PHYSICAL MEDICINE AND REHAB | Facility: CLINIC | Age: 60
End: 2025-04-16
Payer: OTHER MISCELLANEOUS

## 2025-04-16 DIAGNOSIS — G56.02 CARPAL TUNNEL SYNDROME OF LEFT WRIST: ICD-10-CM

## 2025-04-16 PROCEDURE — 95886 MUSC TEST DONE W/N TEST COMP: CPT | Performed by: PHYSICAL MEDICINE & REHABILITATION

## 2025-04-16 PROCEDURE — 95909 NRV CNDJ TST 5-6 STUDIES: CPT | Performed by: PHYSICAL MEDICINE & REHABILITATION

## 2025-04-16 NOTE — PROCEDURES
[address] [address]  Telephone number: [phone]  Fax number: [phone]        Full Name: MAVIS GONZALEZ Gender: Female  Patient ID: [id number] YOB: 1965      Visit Date: 4/16/2025 1:38 PM  Age: 60 Years  Examining Physician: MARIALUISA ANAND DO  Referring Physician: RADHA SPANN MD  Height: 5 feet 1 inch  Weight: 210 lbs  History: 60 year old left handed female presents with left wrist pain that began months while at work, with a few instances of numbness and tingling in the 3rd and 4th fingers.    Physical exam:  normal muscle bulk in the left hand  SILT m/r/u distributions of the left hand  5/5 ABP, finger flexor strength  2/4 LUE reflexes  Le test negative left  Tinel's test + left wrist      Sensory NCS      Nerve / Sites Rec. Site Onset Lat Peak Lat NP Amp PP Amp Segments Distance Peak Diff Velocity Comment     ms ms µV µV  cm ms m/s    L Median - Dig II (Antidromic)      Wrist Index 2.19 3.07 29.3 39.2 Wrist - Index 14  64    L Ulnar - Dig V (Antidromic)      Wrist Dig V 2.55 3.33 23.1 17.3 Wrist - Dig V 14  55    L Radial - Superficial (Antidromic)      Forearm Wrist 1.77 2.29 39.2 37.6 Forearm - Wrist 10  56    L Median, Ulnar - Transcarpal comparison      Median Palm Wrist 1.61 2.14 88.5 76.9 Median Palm - Wrist 8  50       Ulnar Palm Wrist 1.51 1.98 44.4 62.9 Ulnar Palm - Wrist 8  53          Median Palm - Ulnar Palm  0.16         Motor NCS      Nerve / Sites Muscle Latency Amplitude Segments Dist. Lat Diff Velocity Comments     ms mV  cm ms m/s    L Median - APB      Wrist APB 2.85 9.8 Wrist - APB 8         Elbow APB 6.75 7.3 Elbow - Wrist 20 3.90 51.3    L Ulnar - ADM      Wrist ADM 2.35 11.7 Wrist - ADM 8         B.Elbow ADM 5.60 10.8 B.Elbow - Wrist 19 3.25 58.5       A.Elbow ADM 7.40 8.2 A.Elbow - B.Elbow 11 1.79 61.4        EMG Summary Table     Spontaneous MUAP Recruitment   Muscle IA Fib PSW Fasc H.F. Amp Dur. PPP Pattern   L.  Deltoid N None None None None N N N N   L. Biceps brachii N None None None None N N N N   L. Triceps brachii N None None None None N N N N   L. Pronator teres N None None None None N N N N   L. Abductor pollicis brevis N None None None None N N N N       Summary    The motor conduction test was normal in all 2 of the tested nerves: L Median - APB, L Ulnar - ADM.    The sensory conduction test was performed on 4 nerve(s). The results were normal in 3 nerve(s): L Median - Dig II (Antidromic), L Ulnar - Dig V (Antidromic), L Radial - Superficial (Antidromic). Findings were unremarkable in 1 nerve(s): L Median, Ulnar - Transcarpal comparison. There were no results outside the specified normal range.      The needle EMG study was normal in all 5 tested muscles: L. Deltoid, L. Biceps brachii, L. Triceps brachii, L. Pronator teres, L. Abductor pollicis brevis.          Conclusion:   This is a normal electrodiagnostic study. There is no electrodiagnostic evidence of mononeuropathy, brachial plexopathy or cervical radiculopathy in the left upper extremity.     Thank you for participating in this patient's care,    Hernan Carrillo DO  Physical Medicine and Rehabilitation  [facility]

## 2025-04-21 ENCOUNTER — OFFICE VISIT (OUTPATIENT)
Dept: ORTHOPEDICS CLINIC | Facility: CLINIC | Age: 60
End: 2025-04-21
Payer: OTHER MISCELLANEOUS

## 2025-04-21 VITALS — BODY MASS INDEX: 39.65 KG/M2 | HEIGHT: 61 IN | WEIGHT: 210 LBS

## 2025-04-21 DIAGNOSIS — M65.4 TENOSYNOVITIS OF RADIAL STYLOID: Primary | ICD-10-CM

## 2025-04-21 DIAGNOSIS — G56.02 CARPAL TUNNEL SYNDROME OF LEFT WRIST: ICD-10-CM

## 2025-04-21 NOTE — PROGRESS NOTES
Clinic Note      Assessment/Plan:  60 year old with   Triggering of right ring and right thumb.  Currently asymptomatic status post 2 injections.  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  There is also a ganglion cyst emanating off the radiocarpal joint that is likely the source of some of her pain.  We can consider a corticosteroid injection to the radiocarpal joint to see if it does not provide any pain relief for the joint pain that she is dealing with.  Overall wrist pain is improved enough that she is ready to return to work starting May 12.  She is devyn continue to progress to her home exercise program to work on strength.  At MMI  Left hand numbness and tingling possible carpal tunnel syndrome-obtain EMG/NCV negative.  Symptoms seem to have improved/resolved.  Would recommend observation at this time  Left de Quervain's tenosynovitis-status post 1 injection with complete resolution of symptoms    Follow-up: As needed    Diagnostic:  EMG/NCV: Normal    Physical Exam:    Ht 5' 1\" (1.549 m)   Wt 210 lb (95.3 kg)   LMP 04/22/2017   BMI 39.68 kg/m²     Constitutional: NAD. AOx3. Well-developed and Well-nourished.   Psychiatric: Normal mood/ affect/ behavior. Judgment and thought content normal.     Bilateral upper Extremity:   Inspection: Skin Intact. No skin lesions. No gross deformity.   Palpation:  (-) TTP over first dorsal compartment, over the volar radiocarpal joint just radial to the FCR tendon.  No pain over the ECU   Motion: Elbow: normal bilateral symmetric ext/flex  Wrist: normal bilateral symmetric ext/flex/sup/pro  Finger: full composite fist,    Special Tests: (-) Active triggering. (-) PIPJ contracture. Normally sensate digit. Normally perfused digit.  Negative ECU Synergy test     Sensory: Positive Tinel's and median compression test on the left.  Sensation intact.    CC: Triggering of right ring and right thumb    HPI: 60  year old left hand female presents with triggering of right ring and right thumb. It started 3 ago. It has failed to improve/resolve. Pain level is moderate. Pain is described as locking. Patient has tried nothing yet.  Patient also has left wrist pain following an injury where she was lifting up something heavy and felt a strain in her wrist.  She has been treating with her brace    (+) pain at A1 Pulley  (+) active triggering    Interval Hx (3/10/2025): Patient notes ongoing weakness and intermittent numbness and tingling.  Patient continues to have pain localized to the first dorsal compartment volar aspect of the wrist and dorsal aspect the    Interval Hx (2025): Resolution of numbness and tingling.  She also notes improvement of the de Quervain's tenosynovitis      Past Medical History:    Breast CA (HCC)    age 56    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    pt unaware    Hypothyroidism    Morbid obesity with BMI of 40.0-44.9, adult (HCC)    KIRTI on CPAP    Postmenopausal bleeding    embx negative     Past Surgical History:   Procedure Laterality Date      ,,,2001    x 4    Colonoscopy N/A 2022    Procedure: COLONOSCOPY;  Surgeon: Jeffrey Alas MD;  Location: [facility] ENDOSCOPY    Lumpectomy right Right 2021    Radiation right Right 10/2021     Current Outpatient Medications   Medication Sig Dispense Refill    levothyroxine 150 MCG Oral Tab Take 1 tablet (150 mcg total) by mouth before breakfast. 90 tablet 3    letrozole 2.5 MG Oral Tab Take 1 tablet (2.5 mg total) by mouth daily. 90 tablet 3    Flaxseed, Linseed, (FLAX SEEDS OR) Take by mouth.      acetaminophen 500 MG Oral Tab Take 1 tablet (500 mg total) by mouth every 6 (six) hours as needed for Pain or Fever.      Multiple Vitamins-Minerals (MULTI-VITAMIN/MINERALS) Oral Tab Take 1 tablet by mouth daily.      Ascorbic Acid (VITAMIN C OR) Take by mouth daily.      Cholecalciferol (VITAMIN D-3 OR) Take by mouth daily.        No Known Allergies  Family History   Problem Relation Age of Onset    Heart Disease Father     Melanoma Father 78    Cancer Mother 71        lung & brain also    Uterine Cancer Mother     Diabetes Mother     Glaucoma Mother     Cancer Maternal Grandmother         esophageal    Cancer Maternal Grandfather         stomach    Breast Cancer Self 56     Social History     Occupational History    Not on file   Tobacco Use    Smoking status: Former     Current packs/day: 0.00     Types: Cigarettes     Start date: 1979     Quit date: 1979     Years since quittin.4     Passive exposure: Past    Smokeless tobacco: Never    Tobacco comments:     smoked age 14 for one week only   Vaping Use    Vaping status: Never Used   Substance and Sexual Activity    Alcohol use: Yes     Alcohol/week: 0.0 standard drinks of alcohol     Comment: Very infrequently; 1 glass of wine/4 months    Drug use: No    Sexual activity: Not on file        Review of Systems (negative unless bolded):  General: fevers, chills, fatigue  CV:  chest pain, palpitations, leg swelling  Msk: bodyaches, neck pain, neck stiffness  Skin: rashes, open wounds, nonhealing ulcers  Hem: bleeds easily, bruise easily, immunocompromised  Neuro: dizziness, light headedness, headaches  Psych: anxious, depressed, anger issues    Shayne Piña MD   Hand, Wrist, & Elbow Surgery  [email or web address]  t: [phone]  f: [phone]

## 2025-04-22 NOTE — PROGRESS NOTES
Jodie  Pt has attended 8 visits in Occupational Therapy.     Subjective: See last note dated 3/21/25.    Objective:     QuickDASH Outcome Score  No data recorded  Post QuickDASH Outcome Score  Post Score: (Patient-Rptd) 40.91 % (3/21/2025  9:31 AM)    -40.91 % improvement    Assessment:   Patient is a 61 yo female, who has been seen in Occupational Therapy since initial eval on 2/14/25, with last treatment session on 3/21/25.  The patient has attended 8/8 scheduled appointments, with 0 no shows and 0 cancellations.  She has not returned since last seen and will be discharged from OT at this time.      Plan: Discharge OT        PAMELLA Estrada, OTR/L, CHT

## 2025-06-11 ENCOUNTER — HOSPITAL ENCOUNTER (OUTPATIENT)
Dept: MAMMOGRAPHY | Facility: HOSPITAL | Age: 60
Discharge: HOME | End: 2025-06-11
Attending: INTERNAL MEDICINE
Payer: COMMERCIAL

## 2025-06-11 DIAGNOSIS — Z17.0 MALIGNANT NEOPLASM OF UPPER-OUTER QUADRANT OF RIGHT BREAST IN FEMALE, ESTROGEN RECEPTOR POSITIVE (HCC): ICD-10-CM

## 2025-06-11 DIAGNOSIS — C50.411 MALIGNANT NEOPLASM OF UPPER-OUTER QUADRANT OF RIGHT BREAST IN FEMALE, ESTROGEN RECEPTOR POSITIVE (HCC): ICD-10-CM

## 2025-06-11 PROCEDURE — 77063 BREAST TOMOSYNTHESIS BI: CPT | Performed by: INTERNAL MEDICINE

## 2025-06-11 PROCEDURE — 77067 SCR MAMMO BI INCL CAD: CPT | Performed by: INTERNAL MEDICINE

## 2025-06-26 ENCOUNTER — OFFICE VISIT (OUTPATIENT)
Age: 60
End: 2025-06-26
Attending: INTERNAL MEDICINE
Payer: COMMERCIAL

## 2025-06-26 VITALS
OXYGEN SATURATION: 74 % | DIASTOLIC BLOOD PRESSURE: 76 MMHG | TEMPERATURE: 98 F | RESPIRATION RATE: 18 BRPM | BODY MASS INDEX: 40.52 KG/M2 | WEIGHT: 214.63 LBS | SYSTOLIC BLOOD PRESSURE: 150 MMHG | HEART RATE: 69 BPM | HEIGHT: 61 IN

## 2025-06-26 DIAGNOSIS — Z17.0 MALIGNANT NEOPLASM OF UPPER-OUTER QUADRANT OF RIGHT BREAST IN FEMALE, ESTROGEN RECEPTOR POSITIVE (HCC): Primary | ICD-10-CM

## 2025-06-26 DIAGNOSIS — Z79.811 ENCOUNTER FOR MONITORING AROMATASE INHIBITOR THERAPY: ICD-10-CM

## 2025-06-26 DIAGNOSIS — L76.82 AXILLARY WEB SYNDROME: ICD-10-CM

## 2025-06-26 DIAGNOSIS — Z51.81 ENCOUNTER FOR MONITORING AROMATASE INHIBITOR THERAPY: ICD-10-CM

## 2025-06-26 DIAGNOSIS — E89.89 LYMPHEDEMA OF UPPER EXTREMITY FOLLOWING LYMPHADENECTOMY: ICD-10-CM

## 2025-06-26 DIAGNOSIS — Z85.3 ENCOUNTER FOR FOLLOW-UP SURVEILLANCE OF BREAST CANCER: ICD-10-CM

## 2025-06-26 DIAGNOSIS — Z08 ENCOUNTER FOR FOLLOW-UP SURVEILLANCE OF BREAST CANCER: ICD-10-CM

## 2025-06-26 DIAGNOSIS — L90.5 AXILLARY WEB SYNDROME: ICD-10-CM

## 2025-06-26 DIAGNOSIS — C50.411 MALIGNANT NEOPLASM OF UPPER-OUTER QUADRANT OF RIGHT BREAST IN FEMALE, ESTROGEN RECEPTOR POSITIVE (HCC): Primary | ICD-10-CM

## 2025-06-26 DIAGNOSIS — I89.0 LYMPHEDEMA OF UPPER EXTREMITY FOLLOWING LYMPHADENECTOMY: ICD-10-CM

## 2025-06-26 DIAGNOSIS — I89.0 LYMPHEDEMA OF BREAST: ICD-10-CM

## 2025-06-26 DIAGNOSIS — Z78.0 POST-MENOPAUSAL: ICD-10-CM

## 2025-06-26 RX ORDER — LETROZOLE 2.5 MG/1
2.5 TABLET, FILM COATED ORAL DAILY
Qty: 90 TABLET | Refills: 3 | Status: SHIPPED | OUTPATIENT
Start: 2025-06-26

## 2025-06-26 NOTE — PROGRESS NOTES
HPI:  Tiana Abreu is a 60 year old female with diagnosis of   Encounter Diagnoses   Name Primary?    Malignant neoplasm of upper-outer quadrant of right breast in female, estrogen receptor positive (HCC) Yes    Encounter for monitoring aromatase inhibitor therapy     Encounter for follow-up surveillance of breast cancer     Lymphedema of breast     Axillary web syndrome     Post-menopausal     Lymphedema of upper extremity following lymphadenectomy        Compliance with SBE: Yes. Changes on SBE: No.      Compliance with letrozole:  compliance all of the time    Side effects from letrozole: Yes hot flashes improved with flax seed oil, No arthralgias or myalgias.    Tight at the R arm.  R elbow pain.  States now the top of arm below the shoulder, states that was scrubbing at home and heavy lifting on the R arm and got discomfort.  Had shots for trigger finger last time and states worked but again worsening and will likely need surgery.   States she is L handed but issues with the L hand that it is weak.  Uses R hand and arm more. No swelling on the chest and states does not think lymphedema a has flared up.      Working on weight loss.  Has gained weight, eating more carbs and now is less active.  She has started getting up early and going for a walk       ECOG PS: 0      Review of Systems   Constitutional:  Negative for appetite change, fatigue and unexpected weight change.   Respiratory:  Negative for cough and shortness of breath.    Cardiovascular:  Negative for chest pain.   Gastrointestinal:  Negative for abdominal pain.   Endocrine: Positive for hot flashes.   Musculoskeletal:  Positive for arthralgias (as above.  Knees from stairs). 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  Had accident remote past that affected her neck.).        No bone pain   Neurological:  Negative for dizziness and headaches.   Hematological:  Negative for adenopathy.    Psychiatric/Behavioral:  Positive for sleep disturbance (KIRTI using CPAP).        ALLERGIES AND MEDICATIONS REVIEWED WITH PATIENT:    Allergies:  No Known Allergies      Current Outpatient Medications:     levothyroxine 150 MCG Oral Tab, Take 1 tablet (150 mcg total) by mouth before breakfast., Disp: 90 tablet, Rfl: 3    letrozole 2.5 MG Oral Tab, Take 1 tablet (2.5 mg total) by mouth daily., Disp: 90 tablet, Rfl: 3    Flaxseed, Linseed, (FLAX SEEDS OR), Take by mouth., Disp: , Rfl:     acetaminophen 500 MG Oral Tab, Take 1 tablet (500 mg total) by mouth every 6 (six) hours as needed for Pain or Fever., Disp: , Rfl:     Multiple Vitamins-Minerals (MULTI-VITAMIN/MINERALS) Oral Tab, Take 1 tablet by mouth in the morning., Disp: , Rfl:     Ascorbic Acid (VITAMIN C OR), Take by mouth in the morning., Disp: , Rfl:     Cholecalciferol (VITAMIN D-3 OR), Take by mouth in the morning., Disp: , Rfl:         HISTORY REVIEWED WITH PATIENT:  Past Medical History:    Breast CA (HCC)    age 56    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    pt unaware    Hypothyroidism    Morbid obesity with BMI of 40.0-44.9, adult (HCC)    KIRTI on CPAP    Postmenopausal bleeding    embx negative      Past Surgical History:   Procedure Laterality Date      ,,,2001    x 4    Colonoscopy N/A 2022    Procedure: COLONOSCOPY;  Surgeon: Jeffrey Alas MD;  Location: [facility] ENDOSCOPY    Lumpectomy right Right 2021    Radiation right Right 10/2021      Family History   Problem Relation Age of Onset    Breast Cancer Self 56    Ovarian Cancer Mother     Cancer Mother 71        lung & brain also    Uterine Cancer Mother     Diabetes Mother     Glaucoma Mother     Heart Disease Father     Melanoma Father 78    Cancer Maternal Grandmother         esophageal    Cancer Maternal Grandfather         stomach    Prostate Cancer Neg     Pancreatic Cancer Neg       Social History     Socioeconomic History    Marital status:     Tobacco Use    Smoking status: Former     Current packs/day: 0.00     Types: Cigarettes     Start date: 1979     Quit date: 1979     Years since quittin.6     Passive exposure: Past    Smokeless tobacco: Never    Tobacco comments:     smoked age 14 for one week only   Vaping Use    Vaping status: Never Used   Substance and Sexual Activity    Alcohol use: Yes     Alcohol/week: 0.0 standard drinks of alcohol     Comment: Very infrequently; 1 glass of wine/4 months    Drug use: No   Other Topics Concern    Caffeine Concern Yes     Comment: Coffee 4 cups daily    History of tanning Yes    Reaction to local anesthetic No    Pt has a pacemaker No    Pt has a defibrillator No   Social History Narrative    Work in [employer]          Exam:  /76 (BP Location: Left arm, Patient Position: Sitting, Cuff Size: large)   Pulse 69   Temp 97.6 °F (36.4 °C) (Tympanic)   Resp 18   Ht 1.549 m (5' 1\")   Wt 97.3 kg (214 lb 9.6 oz)   LMP 2017   SpO2 (!) 74%   BMI 40.55 kg/m²   Wt Readings from Last 6 Encounters:   25 97.3 kg (214 lb 9.6 oz)   25 95.3 kg (210 lb)   03/10/25 95.3 kg (210 lb)   02/10/25 95.3 kg (210 lb)   25 95.3 kg (210 lb)   24 95.3 kg (210 lb)     General: Patient is alert, not in acute distress.  HEENT: EOMs intact. PERRL.   Neck: No JVD. No palpable lymphadenopathy. Neck is supple.  Chest: Clear to auscultation.  Breasts: R breast with surgical changes and RT changes, no lymphedema, no masses.  New cording on the R axilla.  L breast with no masses.   Heart: Regular rate and rhythm.   Abdomen: Soft, non tender with good bowel sounds.  Extremities: No edema.  Neurological: Grossly intact.   Lymphatics: There is no palpable lymphadenopathy throughout in the cervical, supraclavicular, axillary, or inguinal regions.  Psych/Depression: nl      Assessment and Plan:    Tiana Abreu is a 60 year old female being evaluated for   Encounter Diagnoses   Name Primary?     Malignant neoplasm of upper-outer quadrant of right breast in female, estrogen receptor positive (HCC) Yes    Encounter for monitoring aromatase inhibitor therapy     Encounter for follow-up surveillance of breast cancer     Lymphedema of breast     Axillary web syndrome     Post-menopausal     Lymphedema of upper extremity following lymphadenectomy        Cancer Staging  Malignant neoplasm of upper-outer quadrant of right breast in female, estrogen receptor positive (HCC)  Staging form: Breast, AJCC 8th Edition  - Pathologic stage from 8/11/2021: Stage IIA (pT2, pN1a(sn), cM0, G3, ER+, WI+, HER2-, Oncotype DX score: 10) - Signed by Zoë Alejandra MD       She is doing well.     She is to complete Letrozole in December of 2026 for 5 yrs of treatment.  D/w patient extended endocrine therapy.  Will submit BCI in June of 2026 and determine if additional benefit past 5 yrs.     B breast done in June of 2025, BIRADS-2.  Next due in June 2026.    DEXA in Sept 2023 was normal, next due in Sept of 2025.    Lymphedema of the breast and axillary web syndrome:  No lymphedema but had new cording on the R axilla.  Refer to PT.      Discussed weight loss, she was at the weight loss clinic in the past and declines.      Quality measures:    Hypertension target range 130-140/70-80  Per PCP.          No follow-ups on file.      MDM moderate.   I have a longitudinal and continuous care relationship with this patient for the management of breast cancer a serious or complex condition.  is applicable because the patient's medical record notes over time support that there is a longitudinal care relationship with me, the care plan reflects the ongoing nature of the continuous relationship of care, and the medical record indicates that there is ongoing treatment of a serious/complex medical condition which I am currently managing.       Data:  PROCEDURE: [facility] CHELA 2D+3D SCREENING BILAT ([id number])     COMPARISON: [facility]  [facility] CHELA 2D+3D SCREENING BILAT ([id number]), 6/05/2024, 11:20 AM.     INDICATIONS: Z17.0 Malignant neoplasm of upper-outer quadrant of right breast in female, estrogen receptor positive (HCC) C50.411 Malignant neoplasm of upper-outer quadrant*  60-year-old female with history of right partial mastectomy in August 2021 followed by radiation presents for routine screening mammogram.  Family history of ovarian cancer in mother at the age of 71.  TECHNIQUE: Full field direct screening mammography was performed and images were reviewed with the Tipser 1.5.1.5 CAD device.  3D tomosynthesis was performed and reviewed        BREAST COMPOSITION:   Category B - There are scattered areas of fibroglandular density.        FINDINGS: Scar marker overlies right upper outer quadrant right axillary region.  Post lumpectomy changes in the form of architectural distortion, dystrophic calcifications and volume loss noted in the right upper outer quadrant.  Few scattered benign  calcifications are seen in both breasts.  Multiple enlarged but stable left axillary lymph nodes dating back to 2021 are noted most likely due to a systemic cause. No suspicious mass, non-surgical distortion, or  calcifications in either breast.                 Impression  CONCLUSION:    No mammographic evidence of breast malignancy.  As long as clinical examination remains benign, annual screening mammogram is recommended in 1 year.        BI-RADS CATEGORY:    DIAGNOSTIC CATEGORY 2 - BENIGN FINDING:       RECOMMENDATIONS:  ROUTINE MAMMOGRAM AND CLINICAL EVALUATION IN 12 MONTHS.          Your patient's answers to the health and family history questions collected during this mammogram indicate a potentially increased risk for breast cancer. It is recommended that this patient be evaluated in our Cancer Risk Assessment Clinic to determine  eligibility for additional breast cancer screening, risk reduction strategies and/or genetic testing.  Providers are encouraged to contact our breast navigator, Juana Jaime, at [phone] with any questions or for guidance regarding this  recommendation.        PLEASE NOTE: NORMAL MAMMOGRAM DOES NOT EXCLUDE THE POSSIBILITY OF BREAST CANCER.  A CLINICALLY SUSPICIOUS PALPABLE LUMP SHOULD BE BIOPSIED.       For patients over the age of 40, the target due date for the patient's next mammogram has been entered into a reminder system.       Patient received a discharge summary from the technologist after completion of exam.     Breast marker legend used on images    Triangle = Palpable lump  Tripp = Skin tag or mole  BB = Nipple  Linear fiona = Scar  Square = Pain           Dictated by (CST): Anni Eldridge MD on 6/15/2025 at 5:40 AM      Finalized by (CST): Anni Eldridge MD on 6/15/2025 at 5:45 AM          [address] [place] [address]  [phone]

## 2025-07-02 ENCOUNTER — TELEPHONE (OUTPATIENT)
Dept: PHYSICAL THERAPY | Facility: HOSPITAL | Age: 60
End: 2025-07-02

## 2025-07-07 ENCOUNTER — OFFICE VISIT (OUTPATIENT)
Dept: PHYSICAL THERAPY | Facility: HOSPITAL | Age: 60
End: 2025-07-07
Attending: INTERNAL MEDICINE
Payer: COMMERCIAL

## 2025-07-07 ENCOUNTER — TELEPHONE (OUTPATIENT)
Dept: PHYSICAL THERAPY | Facility: HOSPITAL | Age: 60
End: 2025-07-07

## 2025-07-07 DIAGNOSIS — L76.82 AXILLARY WEB SYNDROME: Primary | ICD-10-CM

## 2025-07-07 DIAGNOSIS — L90.5 AXILLARY WEB SYNDROME: Primary | ICD-10-CM

## 2025-07-07 PROCEDURE — 97162 PT EVAL MOD COMPLEX 30 MIN: CPT | Performed by: PHYSICAL THERAPIST

## 2025-07-07 PROCEDURE — 97140 MANUAL THERAPY 1/> REGIONS: CPT | Performed by: PHYSICAL THERAPIST

## 2025-07-07 PROCEDURE — 97110 THERAPEUTIC EXERCISES: CPT | Performed by: PHYSICAL THERAPIST

## 2025-07-07 NOTE — PROGRESS NOTES
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 Pt w/ cording and RUE lymphedema after treatment, improved w/ treatment. Pt reporting onset of right shld pain (lateral upper arm) mid-May.   Pain level: current 0 /10, at best 0 /10, at worst 8 /10 (increases when trying to lift arm, and at night/trying to sleep)  Description of symptoms: pain R lateral shoulder, occasionally goes down the arm and up towards neck   Occupation:    Leisure activities/Hobbies:     Prior level of function: Independent w/ all ADLs  Current limitations: Unable to lift RUE making dressing and bathing difficult, unable to lift anything w/ RUE  Pt goals: Improve ROM, decrease pain  Hand Dominance: left  Do you live with someone who would be able to assist you:    Self-Reported Weight: 214 lb  Are you following the recommended diet from your physician: Yes    Past medical history was reviewed with Tiana.  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  She  has a past surgical history that includes  (,,,); radiation right (Right, 10/2021); lumpectomy right (Right, 2021); and colonoscopy (N/A, 2022).    ASSESSMENT  Tiana presents to physical therapy evaluation with primary c/o Pain and limited ROM right shld. The results of the objective tests and measures show swelling R  trunk/lateral breast, decreased ROM and strength R shld, decreased scap strength, decreased flexibility and +impingement R shld. Functional deficits include but are not limited to Unable to lift RUE making dressing and bathing difficult, unable to lift anything w/ RUE. Signs and symptoms are consistent with a rehabilitation diagnosis of lymphedema R trunk, Axillary Web syndrome, and impingement syndrome R shld. Pt and PT discussed evaluation findings, pathology, and POC.  Pt voiced understanding of plan of care and agrees to participate in self-care including HEP and progression towards self-management. Skilled Physical Therapy is medically necessary to address the above impairments and reach functional goals.    Reason for lymphedema: Secondary Lymphedema Cause: breast cancer  Stage of lymphedema: 2  Phase of treatment: Phase 1: Reduction Phase    OBJECTIVE:     Musculoskeletal  Posture: Rounded shld, forward head  Special Tests: + impingement R shld     ROM and Strength:  (* denotes performed with pain)  Shoulder   ROM MMT (-/5)    R L R L     Flex 160 (w/ pain) 165 4- 5     Ext             Abd (C5) 160 (with pain) 165 3+ 4+     IR L4 (with pain) T7 4 4+     ER 75 (with pain) 90 4 4+     Low Trap n/a 3- 3+     Mid Trap n/a 3 4-     SA n/a           Flexibility:    UE Flexibility R L     Upper Trap min restricted (tenderness) WNL     Levator Scap         Pec Major min restricted WNL     Scalenes         Latissimus min restricted (tenderness) WNL     Bicep -- (tenderness)         Swelling       7/7/2025   Self Care   Are you following the recommended diet from your physician? Yes   Lymph Class Secondary Lymphedema   Stage of Lymphedema 2   2nd Class Cause breast cancer   Cause breast cancer   Phase of treatment Phase 1: Reduction Phase         7/7/2025   Edema/Tissue Observations   RUE Locations  Right Axilla   Edema/Tissue Observations: Rt Axilla swelling;cording;firmness       Cording does not restrict ROM or  cause pain, swelling and firmness towards inf/posterior axilla   Trunk Locations Right Upper Lateral Trunk;Right Lateral Breast   Edema/Tissue Observations: Right upper lateral trunk swelling;firmness   Edema/Tissue Observations: Right lateral breast swelling;firmness   # of Trunk Measurements 2   Trunk Measurement 1 10 cm inf from sternal notch: 104.8 cm   Trunk Measurement 2 18 cm inf to sternal notch: 108 cm        Stemmer Sign:      Trunk Measurements       7/7/2025   Trunk Measurements   Trunk Measurement 1 10 cm inf from sternal notch: 104.8 cm   Trunk Measurement 2 18 cm inf to sternal notch: 108 cm          Lymphedema Measurements       7/7/2025   Lymphedema Calculations   DATE MEASURED 7/7/2025   LOCATION/MEASUREMENTS RUE   PATIENT POSITION  supine   LIMB POSITION 75 degrees abd   STARTING POINT 17 cm from 3rd cuticle   RIGHT HAND VOLUME 19.6 across palm   LEFT HAND VOLUME 19.7 across palm   MEASUREMENT A 15.9   MEASUREMENT B 18.8   MEASUREMENT C 22.7   MEASUREMENT D 26.4   MEASUREMENT E 28.3   MEASUREMENT F 35.5   MEASUREMENT G 41.3   MEASUREMENT H 41.3   MEASUREMENT I 42.9    TOTAL VOLUME  [id number]   DATE MEASURED 7/7/2025   LOCATION/MEASUREMENTS LUE   MEASUREMENT A 15.8   MEASUREMENT B 18.3   MEASUREMENT C 21.8   MEASUREMENT D 25.8   MEASUREMENT E 29   MEASUREMENT F 36.4   MEASUREMENT G 40.8   MEASUREMENT H 42.8   MEASUREMENT I 42.8    TOTAL VOLUME  [phone]   % DIFFERENCE -[phone]            Today's Treatment and Response:   Pt education was provided on exam findings, treatment diagnosis, treatment plan, expectations, and prognosis.    Today's Treatment       7/7/2025   PT Treatment   Insurance Auth # and Certification Dates Certification From: 7/7/2025  To: 10/5/2025. HMO approval 8 visits exp 9/26/2025   # of Visits Used/Approved 1/8   Therapeutic Exercise Green t-band:  - narrow rows x 10  - wide rows x 10  - shld ext x 10  - shld ER (palms down) x 10   Manual Therapy STM R axilla/areas of  cording, lat, pect, deltoid, upper trap    MLD: Neck sequence, abd sequence, left axillary, A-A, R inguinal, R A-I, Right axillary, Right breast, R upper arm. Increased time on trunk and lateral breast.    Compression:  - pt has her compression sleeves and gauntlet, they are older and stretched out slightly, may benefit from newer ones (but pt does not have arm swelling)         Therapeutic Activity Explained Lymphedema; reviewed lymphatic system, informed patient of lymphedema precautions and risk reduction practices, and importance of skin care.     Reviewed sleeping positions to avoid impingement R shld (supine w/ arm on pillows, R sidely w/ pillow to avoid prolonged ER, L sidely w/ pillow to avoid IR/horiz adduction)   Therapeutic Exercise Min 10   Manual Therapy Min 30   Therapeutic Activity Min 5   Evaluation Min 35   Total of Timed Procedures 45   Total of Service Based 35   Total Treatment Time 80         Charges:  PT EVAL: Moderate Complexity, mm2, ex1  In agreement with evaluation findings and clinical rationale, this evaluation involved MODERATE COMPLEXITY decision making due to 1-2 personal factors/comorbidities, 3 or more body structures involved/activity limitations, and evolving symptoms as documented in the evaluation.                                                                   PLAN OF CARE:      Goals: (to be met in 8 visits)        LYMPHEDEMA GOALS Not Met Progressing Toward Partially Met Met   Decrease swelling R trunk/lateral breast by 2 cm to decrease risks of infection       Pt to be independent with self MLD, ROM exercises, and donning/doffing compression bandages/garments to facilitate swelling reduction and to be independent at self-management once discharged.       Increase R shoulder AROM to by symmetrical to L shoulder to improve ease of dressing/bathing/and reaching overhead.       Increase R UE strength to at least 4+/5 to improve ease of lifting and performing household chores.         Improve B scap strength to at least 4/5 to allow pt to reach overhead without pain                       Frequency / Duration: Patient will be seen 1-2x/week or a total of 8 visits over a 90 day period. Treatment will include: MLD; Compression; Skin care; Remedial exercise; Manual therapy; Self-care home management; Therapeutic activities; Therapeutic exercises; Home exercise program instructions    Education or treatment limitation: None   Rehab Potential: good     LLIS SCORES    Q1-Q17 Score #of Q's Answered Raw Score Percent Impairment      38    17    2.24    56       Patient/Family/Caregiver was advised of these findings, precautions, and treatment options and has agreed to actively participate in planning and for this course of care.    Thank you for your referral. Please co-sign or sign and return this letter via fax as soon as possible to [phone]. If you have any questions, please contact me at Dept: [phone]    Sincerely,  Electronically signed by therapist: Fawn Roberto PT  Physician's certification required: Yes  I certify the need for these services furnished under this plan of treatment and while under my care.    X___________________________________________________ Date____________________    Certification From: 7/7/2025  To: 10/5/2025

## 2025-07-17 ENCOUNTER — OFFICE VISIT (OUTPATIENT)
Dept: PHYSICAL THERAPY | Facility: HOSPITAL | Age: 60
End: 2025-07-17
Attending: INTERNAL MEDICINE
Payer: COMMERCIAL

## 2025-07-17 PROCEDURE — 97140 MANUAL THERAPY 1/> REGIONS: CPT | Performed by: PHYSICAL THERAPIST

## 2025-07-17 PROCEDURE — 97110 THERAPEUTIC EXERCISES: CPT | Performed by: PHYSICAL THERAPIST

## 2025-07-17 NOTE — PROGRESS NOTES
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) Zoë Alejandra  [facility] HMO   Date of Surgery: No data recorded  N/A   Precautions:  Cancer  Referral Information:    Date of Evaluation: Req: 8, Auth: 8, Exp: 9/26/2025 07/07/25 POC Auth Visits:  8       Today's Date   7/17/2025    Subjective  Pt reporting pain R shld/upper arm \"I had a to do a lot laundry\"       Pain: 6/10     Objective  + impingement R shld, swelling R breast/trunk, decreased strength R shld and B scap       Assessment  Modified exercises to avoid impingement R shld    Goals (to be met in 8 visits)      LYMPHEDEMA GOALS Not Met Progressing Toward Partially Met Met   Decrease swelling R trunk/lateral breast by 2 cm to decrease risks of infection       Pt to be independent with self MLD, ROM exercises, and donning/doffing compression bandages/garments to facilitate swelling reduction and to be independent at self-management once discharged.       Increase R shoulder AROM to by symmetrical to L shoulder to improve ease of dressing/bathing/and reaching overhead.       Increase R UE strength to at least 4+/5 to improve ease of lifting and performing household chores.        Improve B scap strength to at least 4/5 to allow pt to reach overhead without pain                           Plan  cont as planned    Treatment Last 4 Visits  Treatment Day: 2       7/7/2025 7/17/2025   PT Treatment   Insurance Auth # and Certification Dates Certification From: 7/7/2025  To: 10/5/2025. HMO approval 8 visits exp 9/26/2025 Certification From: 7/7/2025  To: 10/5/2025. HMO approval 8 visits exp 9/26/2025   # of Visits Used/Approved 1/8 2/8   Therapeutic Exercise Green t-band:  - narrow rows x 10  - wide rows x 10  - shld ext x 10  - shld ER (palms down) x 10 Prone   - rhomb x 10  - mid trap x 10  (Above done RUE only, w/ arm hanging over edge of bed)    R shld ER isometric in doorway x 10  (forearm pronated)   Manual Therapy STM R axilla/areas of cording, lat, pect, deltoid, upper trap    MLD: Neck sequence, abd sequence, left axillary, A-A, R inguinal, R A-I, Right axillary, Right breast, R upper arm. Increased time on trunk and lateral breast.    Compression:  - pt has her compression sleeves and gauntlet, they are older and stretched out slightly, may benefit from newer ones (but pt does not have arm swelling)       STM R deltoid, lat, and pect    MLD: Neck sequence, abd sequence, left axillary, A-A, R inguinal, R A-I, Right axillary, Right breast, RUE. Increased focus trunk     Therapeutic Activity Explained Lymphedema; reviewed lymphatic system, informed patient of lymphedema precautions and risk reduction practices, and importance of skin care.     Reviewed sleeping positions to avoid impingement R shld (supine w/ arm on pillows, R sidely w/ pillow to avoid prolonged ER, L sidely w/ pillow to avoid IR/horiz adduction) Reviewed impingement syndrome   Therapeutic Exercise Min 10 10   Manual Therapy Min 30 40   Therapeutic Activity Min 5    Evaluation Min 35    Total of Timed Procedures 45 50   Total of Service Based 35 0   Total Treatment Time 80 50        HEP       Charges     mm3, ex1

## 2025-07-21 ENCOUNTER — OFFICE VISIT (OUTPATIENT)
Dept: PHYSICAL THERAPY | Facility: HOSPITAL | Age: 60
End: 2025-07-21
Attending: INTERNAL MEDICINE
Payer: COMMERCIAL

## 2025-07-21 PROCEDURE — 97140 MANUAL THERAPY 1/> REGIONS: CPT

## 2025-07-21 PROCEDURE — 97110 THERAPEUTIC EXERCISES: CPT

## 2025-07-21 NOTE — PROGRESS NOTES
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  \"I need to make sure I am doing the exercises correctly.  I feel that my arm hurts more after the exercises.\"       Pain: 6/10     Objective  + impingement R shld, swelling R breast/trunk, decreased strength R shld and B scap       Assessment  Reviewed exercise modifications to avoid impingement.    Goals (to be met in 8 visits)   LYMPHEDEMA GOALS Not Met Progressing Toward Partially Met Met   Decrease swelling R trunk/lateral breast by 2 cm to decrease risks of infection           Pt to be independent with self MLD, ROM exercises, and donning/doffing compression bandages/garments to facilitate swelling reduction and to be independent at self-management once discharged.           Increase R shoulder AROM to by symmetrical to L shoulder to improve ease of dressing/bathing/and reaching overhead.           Increase R UE strength to at least 4+/5 to improve ease of lifting and performing household chores.            Improve B scap strength to at least 4/5 to allow pt to reach overhead without pain                             Plan  cont as planned    Treatment Last 4 Visits  Treatment Day: 3       7/7/2025 7/17/2025 7/21/2025   PT Treatment   Insurance Auth # and Certification Dates Certification From: 7/7/2025  To: 10/5/2025. HMO approval 8 visits exp 9/26/2025 Certification From: 7/7/2025  To: 10/5/2025. HMO approval 8 visits exp 9/26/2025 Certification From: 7/7/2025  To: 10/5/2025. HMO approval 8 visits exp 9/26/2025   # of Visits Used/Approved 1/8 2/8 3/8   Therapeutic Exercise Green t-band:  -  narrow rows x 10  - wide rows x 10  - shld ext x 10  - shld ER (palms down) x 10 Prone   - rhomb x 10  - mid trap x 10  (Above done RUE only, w/ arm hanging over edge of bed)    R shld ER isometric in doorway x 10 (forearm pronated) Prone   - rhomb x 10  - mid trap x 10  (Above done RUE only, w/ arm hanging over edge of bed)  R shld ER isometric in doorway x 10 (forearm pronated)  *added small towel to keep arm at neutral position.   Manual Therapy STM R axilla/areas of cording, lat, pect, deltoid, upper trap    MLD: Neck sequence, abd sequence, left axillary, A-A, R inguinal, R A-I, Right axillary, Right breast, R upper arm. Increased time on trunk and lateral breast.    Compression:  - pt has her compression sleeves and gauntlet, they are older and stretched out slightly, may benefit from newer ones (but pt does not have arm swelling)       STM R deltoid, lat, and pect    MLD: Neck sequence, abd sequence, left axillary, A-A, R inguinal, R A-I, Right axillary, Right breast, RUE. Increased focus trunk   STM R deltoid, lat, and pect    MLD: Neck sequence, abd sequence, left axillary, A-A, R inguinal, R A-I, Right axillary, Right breast, RUE. Increased focus trunk     Therapeutic Activity Explained Lymphedema; reviewed lymphatic system, informed patient of lymphedema precautions and risk reduction practices, and importance of skin care.     Reviewed sleeping positions to avoid impingement R shld (supine w/ arm on pillows, R sidely w/ pillow to avoid prolonged ER, L sidely w/ pillow to avoid IR/horiz adduction) Reviewed impingement syndrome Reviewed reason for specific positions of RUE when doing the exercises to avoid shoulder impingement.  Keep exercises pain free.   Therapeutic Exercise Min 10 10 15   Manual Therapy Min 30 40 30   Therapeutic Activity Min 5  5   Evaluation Min 35     Total of Timed Procedures 45 50 50   Total of Service Based 35 0 0   Total Treatment Time 80 50 50        HEP  YES    Charges     MM2,  There Ex1,

## 2025-07-23 ENCOUNTER — OFFICE VISIT (OUTPATIENT)
Dept: PHYSICAL THERAPY | Facility: HOSPITAL | Age: 60
End: 2025-07-23
Attending: INTERNAL MEDICINE
Payer: COMMERCIAL

## 2025-07-23 PROCEDURE — 97110 THERAPEUTIC EXERCISES: CPT

## 2025-07-23 PROCEDURE — 97140 MANUAL THERAPY 1/> REGIONS: CPT

## 2025-07-23 PROCEDURE — 97530 THERAPEUTIC ACTIVITIES: CPT

## 2025-07-23 NOTE — PROGRESS NOTES
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  \"If I exercise it doesn't hurt untl much later, if I take advil it seems once the advil wears off all my pain is even worse than before taking the medication.\"       Pain: 6/10     Objective  MEASUREMENTS TAKEN: AROM and MMT  + impingement R shld, swelling R breast/trunk, decreased strength R shld and B scap   Shoulder       7/7/2025 7/23/2025   Shoulder ROM/MMT   Rt Flexion Shoulder 160       w/ pain 160   Lt Flexion Shoulder 165 165   Rt Flexion Shoulder MMT 4- 4-   Lt Flexion Shoulder MMT 5 5   Rt Abduction Shoulder 160       with pain 160       Pain arc   Lt Abduction Shoulder 165 165   Rt Abduction Shoulder MMT (C5) 3+ 3+   Lt Abduction Shoulder MMT (C5) 4+ 4+   Rt IR Shoulder L4       with pain L4   Lt IR Shoulder T7 T7   Rt IR Shoulder MMT 4 4   Lt IR Shoulder MMT 4+ 4+   Rt ER Shoulder 75       with pain 75   Lt ER Shoulder 90 90   Rt ER Shoulder MMT 4 4   Lt ER Shoulder MMT 4+ 4+   Rt Low Trap MMT 3- 3-   Lt Low Trap MMT 3+ 3+   Rt Mid Trap MMT 3 3   Lt Mid Trap MMT 4- 4-        Assessment  No change to AROM or MMT.  Pain arc RUE    Goals (to be met in 8 visits)     LYMPHEDEMA GOALS Not Met Progressing Toward Partially Met Met   Decrease swelling R trunk/lateral breast by 2 cm to decrease risks of infection           Pt to be independent with self MLD, ROM exercises, and donning/doffing compression bandages/garments to facilitate swelling reduction and to be independent at self-management once discharged.           Increase R shoulder AROM to by symmetrical to L shoulder  to improve ease of dressing/bathing/and reaching overhead.           Increase R UE strength to at least 4+/5 to improve ease of lifting and performing household chores.            Improve B scap strength to at least 4/5 to allow pt to reach overhead without pain                                 Plan  cont as planned    Treatment Last 4 Visits  Treatment Day: 4       7/7/2025 7/17/2025 7/21/2025 7/23/2025   PT Treatment   Insurance Auth # and Certification Dates Certification From: 7/7/2025  To: 10/5/2025. HMO approval 8 visits exp 9/26/2025 Certification From: 7/7/2025  To: 10/5/2025. HMO approval 8 visits exp 9/26/2025 Certification From: 7/7/2025  To: 10/5/2025. HMO approval 8 visits exp 9/26/2025 Certification From: 7/7/2025  To: 10/5/2025. HMO approval 8 visits exp 9/26/2025   # of Visits Used/Approved 1/8 2/8 3/8 4/8   Therapeutic Exercise Green t-band:  - narrow rows x 10  - wide rows x 10  - shld ext x 10  - shld ER (palms down) x 10 Prone   - rhomb x 10  - mid trap x 10  (Above done RUE only, w/ arm hanging over edge of bed)    R shld ER isometric in doorway x 10 (forearm pronated) Prone   - rhomb x 10  - mid trap x 10  (Above done RUE only, w/ arm hanging over edge of bed)  R shld ER isometric in doorway x 10 (forearm pronated)  *added small towel to keep arm at neutral position. Scap protraction in supine RUE 3 x 10  Rhythmic stabilization in supine RUE: shoulder 90 deg flexion, [id number]  Prone   - rhomb x 10  - mid trap x 10  (Above done RUE only, w/ arm hanging over edge of bed)  R shld ER isometric in doorway x 10 (forearm pronated)  *added small towel to keep arm at neutral position.   Manual Therapy STM R axilla/areas of cording, lat, pect, deltoid, upper trap    MLD: Neck sequence, abd sequence, left axillary, A-A, R inguinal, R A-I, Right axillary, Right breast, R upper arm. Increased time on trunk and lateral breast.    Compression:  - pt has her compression sleeves and gauntlet, they are older and  stretched out slightly, may benefit from newer ones (but pt does not have arm swelling)       STM R deltoid, lat, and pect    MLD: Neck sequence, abd sequence, left axillary, A-A, R inguinal, R A-I, Right axillary, Right breast, RUE. Increased focus trunk   STM R deltoid, lat, and pect    MLD: Neck sequence, abd sequence, left axillary, A-A, R inguinal, R A-I, Right axillary, Right breast, RUE. Increased focus trunk   Measurements taken BUE shoulder AROM/MMT    STM R axilla/areas of cording, lat, pect, deltoid, upper trap  MLD: Neck sequence, abd sequence, left axillary, A-A, R inguinal, R A-I, Right axillary, Right breast, RUE. Increased focus trunk    Compression:  -have not addressed compression sleeves     Therapeutic Activity Explained Lymphedema; reviewed lymphatic system, informed patient of lymphedema precautions and risk reduction practices, and importance of skin care.     Reviewed sleeping positions to avoid impingement R shld (supine w/ arm on pillows, R sidely w/ pillow to avoid prolonged ER, L sidely w/ pillow to avoid IR/horiz adduction) Reviewed impingement syndrome Reviewed reason for specific positions of RUE when doing the exercises to avoid shoulder impingement.  Keep exercises pain free. After discussing pt's work station at home, we reviewed postural awareness and works station ergonomics.  Discussed and demo'd proper height of computer, proper sitting position, support for UE while using mouse.  Work station posture may be contributing to some of patients complaints.   Therapeutic Exercise Min 10 10 15 10   Manual Therapy Min 30 40 30 35   Therapeutic Activity Min 5  5 10   Evaluation Min 35      Total of Timed Procedures 45 50 50 55   Total of Service Based 35 0 0 0   Total Treatment Time 80 50 50 55        HEP       Charges     MM2, There Ex1, There act1

## 2025-07-23 NOTE — PATIENT INSTRUCTIONS
HOME EXERCISES AND AWARENESS:     Make sure you work station is set up properly.  Computer screen eye level, chair w

## 2025-07-28 ENCOUNTER — OFFICE VISIT (OUTPATIENT)
Dept: PHYSICAL THERAPY | Facility: HOSPITAL | Age: 60
End: 2025-07-28
Attending: INTERNAL MEDICINE
Payer: COMMERCIAL

## 2025-07-28 PROCEDURE — 97140 MANUAL THERAPY 1/> REGIONS: CPT | Performed by: PHYSICAL THERAPIST

## 2025-07-28 PROCEDURE — 97110 THERAPEUTIC EXERCISES: CPT | Performed by: PHYSICAL THERAPIST

## 2025-07-28 NOTE — PROGRESS NOTES
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) [facility]  [facility]   Date of Surgery: No data recorded  N/A   Precautions:  Cancer  Referral Information:    Date of Evaluation: Req: 8, Auth: 8, Exp: 9/26/2025 07/07/25 POC Auth Visits:  8       Today's Date   7/28/2025    Subjective  \"Better, but I move the wrong way and it hurts\"       Pain:       Objective  + impingement R shld, + painful arc, swelling R breast/trunk, decreased strength R shld and B scap   Trunk Measurements       7/7/2025 7/28/2025   Trunk Measurements   Trunk Measurement 1 10 cm inf from sternal notch: 104.8 cm 10 cm inf from sternal notch: 104.8 (IE: 104.8 cm)   Trunk Measurement 2 18 cm inf to sternal notch: 108 cm 18 cm inf to sternal notch: 107.5 (IE: 108 cm)         Assessment  Swelling R lateral trunk/breast softening, decreasing. ROM and strength deficitis R shld persists. Recommended pt see ortho. Pt to call PCP for referral    Goals (to be met in 8 visits)      LYMPHEDEMA GOALS Not Met Progressing Toward Partially Met Met   Decrease swelling R trunk/lateral breast by 2 cm to decrease risks of infection       Pt to be independent with self MLD, ROM exercises, and donning/doffing compression bandages/garments to facilitate swelling reduction and to be independent at self-management once discharged.       Increase R shoulder AROM to by symmetrical to L shoulder to improve ease of dressing/bathing/and reaching overhead.       Increase R UE strength to at least 4+/5 to improve ease of lifting and performing household chores.        Improve B scap strength to at least 4/5 to allow pt to reach overhead without pain                               Plan  Cont w/ CDT, shld strengthening/stretching    Treatment Last 4 Visits  Treatment Day: 5 7/17/2025 7/21/2025 7/23/2025 7/28/2025   PT Treatment   Insurance Auth # and Certification Dates Certification  From: 7/7/2025  To: 10/5/2025. HMO approval 8 visits exp 9/26/2025 Certification From: 7/7/2025  To: 10/5/2025. HMO approval 8 visits exp 9/26/2025 Certification From: 7/7/2025  To: 10/5/2025. HMO approval 8 visits exp 9/26/2025 Certification From: 7/7/2025  To: 10/5/2025. HMO approval 8 visits exp 9/26/2025   # of Visits Used/Approved 2/8 3/8 4/8 5/8   Therapeutic Exercise Prone   - rhomb x 10  - mid trap x 10  (Above done RUE only, w/ arm hanging over edge of bed)    R shld ER isometric in doorway x 10 (forearm pronated) Prone   - rhomb x 10  - mid trap x 10  (Above done RUE only, w/ arm hanging over edge of bed)  R shld ER isometric in doorway x 10 (forearm pronated)  *added small towel to keep arm at neutral position. Scap protraction in supine RUE 3 x 10  Rhythmic stabilization in supine RUE: shoulder 90 deg flexion, [id number]  Prone   - rhomb x 10  - mid trap x 10  (Above done RUE only, w/ arm hanging over edge of bed)  R shld ER isometric in doorway x 10 (forearm pronated)  *added small towel to keep arm at neutral position. - supine manual stretching R shld  - supine manual resistance isometric ER/IR x 5 x2 each  - sidely R shld ER 2# x 10 x3 sets    Encouraged pt to cont w/ prior HEP that was given (prone scap ex, ER w/ tband, isometric ER)   Manual Therapy STM R deltoid, lat, and pect    MLD: Neck sequence, abd sequence, left axillary, A-A, R inguinal, R A-I, Right axillary, Right breast, RUE. Increased focus trunk   STM R deltoid, lat, and pect    MLD: Neck sequence, abd sequence, left axillary, A-A, R inguinal, R A-I, Right axillary, Right breast, RUE. Increased focus trunk   Measurements taken BUE shoulder AROM/MMT    STM R axilla/areas of cording, lat, pect, deltoid, upper trap  MLD: Neck sequence, abd sequence, left axillary, A-A, R inguinal, R A-I, Right axillary, Right breast, RUE. Increased focus trunk    Compression:  -have not addressed compression sleeves   Trunk measurements    STM R  biceps    MLD: Neck sequence, abd sequence, left axillary, A-A, R inguinal, R A-I, Right axillary, Right breast, RUE. Increased time to trunk and lateral breast area    Reviewed self MLD   Therapeutic Activity Reviewed impingement syndrome Reviewed reason for specific positions of RUE when doing the exercises to avoid shoulder impingement.  Keep exercises pain free. After discussing pt's work station at home, we reviewed postural awareness and works station ergonomics.  Discussed and demo'd proper height of computer, proper sitting position, support for UE while using mouse.  Work station posture may be contributing to some of patients complaints.    Therapeutic Exercise Min 10 15 10 20   Manual Therapy Min 40 30 35 25   Therapeutic Activity Min  5 10    Total of Timed Procedures 50 50 55 45   Total of Service Based 0 0 0 0   Total Treatment Time 50 50 55 45        HEP       Charges     mm2, ex1

## 2025-07-31 ENCOUNTER — OFFICE VISIT (OUTPATIENT)
Dept: PHYSICAL THERAPY | Facility: HOSPITAL | Age: 60
End: 2025-07-31
Attending: INTERNAL MEDICINE
Payer: COMMERCIAL

## 2025-07-31 PROCEDURE — 97140 MANUAL THERAPY 1/> REGIONS: CPT

## 2025-07-31 PROCEDURE — 97110 THERAPEUTIC EXERCISES: CPT

## 2025-08-04 ENCOUNTER — APPOINTMENT (OUTPATIENT)
Dept: PHYSICAL THERAPY | Facility: HOSPITAL | Age: 60
End: 2025-08-04
Attending: INTERNAL MEDICINE

## 2025-08-07 ENCOUNTER — OFFICE VISIT (OUTPATIENT)
Dept: PHYSICAL THERAPY | Facility: HOSPITAL | Age: 60
End: 2025-08-07
Attending: INTERNAL MEDICINE

## 2025-08-07 PROCEDURE — 97140 MANUAL THERAPY 1/> REGIONS: CPT

## 2025-08-07 PROCEDURE — 97110 THERAPEUTIC EXERCISES: CPT

## 2025-08-11 ENCOUNTER — APPOINTMENT (OUTPATIENT)
Dept: PHYSICAL THERAPY | Facility: HOSPITAL | Age: 60
End: 2025-08-11
Attending: INTERNAL MEDICINE

## 2025-08-13 ENCOUNTER — HOSPITAL ENCOUNTER (OUTPATIENT)
Dept: GENERAL RADIOLOGY | Facility: HOSPITAL | Age: 60
Discharge: HOME | End: 2025-08-13
Attending: STUDENT IN AN ORGANIZED HEALTH CARE EDUCATION/TRAINING PROGRAM

## 2025-08-13 ENCOUNTER — OFFICE VISIT (OUTPATIENT)
Facility: CLINIC | Age: 60
End: 2025-08-13

## 2025-08-13 DIAGNOSIS — M25.511 RIGHT SHOULDER PAIN, UNSPECIFIED CHRONICITY: ICD-10-CM

## 2025-08-13 DIAGNOSIS — M25.511 RIGHT SHOULDER PAIN, UNSPECIFIED CHRONICITY: Primary | ICD-10-CM

## 2025-08-13 DIAGNOSIS — M67.911 DISORDER OF RIGHT ROTATOR CUFF: ICD-10-CM

## 2025-08-13 DIAGNOSIS — M75.01 ADHESIVE CAPSULITIS OF RIGHT SHOULDER: ICD-10-CM

## 2025-08-13 PROCEDURE — 73030 X-RAY EXAM OF SHOULDER: CPT | Performed by: STUDENT IN AN ORGANIZED HEALTH CARE EDUCATION/TRAINING PROGRAM

## 2025-08-13 PROCEDURE — 99213 OFFICE O/P EST LOW 20 MIN: CPT | Performed by: STUDENT IN AN ORGANIZED HEALTH CARE EDUCATION/TRAINING PROGRAM

## 2025-08-14 ENCOUNTER — OFFICE VISIT (OUTPATIENT)
Dept: PHYSICAL THERAPY | Facility: HOSPITAL | Age: 60
End: 2025-08-14
Attending: INTERNAL MEDICINE

## 2025-08-14 PROCEDURE — 97140 MANUAL THERAPY 1/> REGIONS: CPT | Performed by: PHYSICAL THERAPIST

## 2025-08-19 ENCOUNTER — TELEPHONE (OUTPATIENT)
Dept: PHYSICAL THERAPY | Facility: HOSPITAL | Age: 60
End: 2025-08-19

## 2025-08-19 ENCOUNTER — OFFICE VISIT (OUTPATIENT)
Dept: PHYSICAL THERAPY | Facility: HOSPITAL | Age: 60
End: 2025-08-19
Attending: STUDENT IN AN ORGANIZED HEALTH CARE EDUCATION/TRAINING PROGRAM

## 2025-08-19 DIAGNOSIS — M75.01 ADHESIVE CAPSULITIS OF RIGHT SHOULDER: ICD-10-CM

## 2025-08-19 DIAGNOSIS — M67.911 DISORDER OF RIGHT ROTATOR CUFF: Primary | ICD-10-CM

## 2025-08-19 PROCEDURE — 97140 MANUAL THERAPY 1/> REGIONS: CPT

## 2025-08-19 PROCEDURE — 97530 THERAPEUTIC ACTIVITIES: CPT

## 2025-08-19 PROCEDURE — 97110 THERAPEUTIC EXERCISES: CPT

## 2025-08-19 PROCEDURE — 97161 PT EVAL LOW COMPLEX 20 MIN: CPT

## 2025-08-22 ENCOUNTER — OFFICE VISIT (OUTPATIENT)
Dept: PHYSICAL THERAPY | Facility: HOSPITAL | Age: 60
End: 2025-08-22
Attending: STUDENT IN AN ORGANIZED HEALTH CARE EDUCATION/TRAINING PROGRAM

## 2025-08-22 PROCEDURE — 97140 MANUAL THERAPY 1/> REGIONS: CPT | Performed by: PHYSICAL THERAPIST

## 2025-08-22 PROCEDURE — 97112 NEUROMUSCULAR REEDUCATION: CPT | Performed by: PHYSICAL THERAPIST

## 2025-08-22 PROCEDURE — 97110 THERAPEUTIC EXERCISES: CPT | Performed by: PHYSICAL THERAPIST

## 2025-08-26 ENCOUNTER — OFFICE VISIT (OUTPATIENT)
Dept: PHYSICAL THERAPY | Facility: HOSPITAL | Age: 60
End: 2025-08-26
Attending: STUDENT IN AN ORGANIZED HEALTH CARE EDUCATION/TRAINING PROGRAM

## 2025-08-26 PROCEDURE — 97110 THERAPEUTIC EXERCISES: CPT

## 2025-08-26 PROCEDURE — 97140 MANUAL THERAPY 1/> REGIONS: CPT

## 2025-08-29 ENCOUNTER — OFFICE VISIT (OUTPATIENT)
Dept: PHYSICAL THERAPY | Facility: HOSPITAL | Age: 60
End: 2025-08-29
Attending: STUDENT IN AN ORGANIZED HEALTH CARE EDUCATION/TRAINING PROGRAM

## 2025-08-29 PROCEDURE — 97110 THERAPEUTIC EXERCISES: CPT

## 2025-08-29 PROCEDURE — 97140 MANUAL THERAPY 1/> REGIONS: CPT

## (undated) DIAGNOSIS — G47.33 OSA (OBSTRUCTIVE SLEEP APNEA): Primary | ICD-10-CM

## (undated) DIAGNOSIS — E03.9 HYPOTHYROIDISM, UNSPECIFIED TYPE: ICD-10-CM

## (undated) DIAGNOSIS — R19.5 POSITIVE FIT (FECAL IMMUNOCHEMICAL TEST): Primary | ICD-10-CM

## (undated) DEVICE — ABDOMINAL PAD: Brand: CURITY

## (undated) DEVICE — ADHESIVE MASTISOL 2/3CC VL

## (undated) DEVICE — GAUZE SPONGES,12 PLY: Brand: CURITY

## (undated) DEVICE — NEEDLE 18G 1-1/2 BLUNT FILL

## (undated) DEVICE — SUTURE PDS II 3-0 Z683G

## (undated) DEVICE — STANDARD HYPODERMIC NEEDLE,POLYPROPYLENE HUB: Brand: MONOJECT

## (undated) DEVICE — REM POLYHESIVE ADULT PATIENT RETURN ELECTRODE: Brand: VALLEYLAB

## (undated) DEVICE — FORCEP RADIAL JAW 4

## (undated) DEVICE — FLEXIBLE YANKAUER,MEDIUM TIP, NO VACUUM CONTROL: Brand: ARGYLE

## (undated) DEVICE — MINOR GENERAL: Brand: MEDLINE INDUSTRIES, INC.

## (undated) DEVICE — CLIP MED INTNL HMCLP TNTLM

## (undated) DEVICE — SOL  .9 1000ML BTL

## (undated) DEVICE — CLIP SM INTNL HMCLP TNTLM ESCP

## (undated) DEVICE — CONTAINER SPEC STR 4OZ GRY LID

## (undated) DEVICE — KIT CLEAN ENDOKIT 1.1OZ GOWNX2

## (undated) DEVICE — 6 ML SYRINGE LUER-LOCK TIP: Brand: MONOJECT

## (undated) DEVICE — ENCORE® PERRY STYLE 42 PF SIZE 6.5, STERILE LATEX POWDER-FREE SURGICAL GLOVE: Brand: ENCORE

## (undated) DEVICE — Device: Brand: JELCO

## (undated) DEVICE — 3M™ STERI-STRIP™ REINFORCED ADHESIVE SKIN CLOSURES, R1547, 1/2 IN X 4 IN (12 MM X 100 MM), 6 STRIPS/ENVELOPE: Brand: 3M™ STERI-STRIP™

## (undated) DEVICE — SUTURE MONOCRYL 4-0 PS-2

## (undated) DEVICE — SUTURE SILK 2-0 FS

## (undated) DEVICE — LINE MNTR ADLT SET O2 INTMD

## (undated) DEVICE — DRAPE PACK CHEST & U BAR

## (undated) DEVICE — MATTRESS PT HOVERMATT 1/2L 34W

## (undated) DEVICE — 12 ML SYRINGE LUER-LOCK TIP: Brand: MONOJECT

## (undated) DEVICE — DRAPE TAPE: Brand: CONVERTORS

## (undated) DEVICE — COVER PRB NEOGUARD 30X2.6CM US

## (undated) DEVICE — SNARE OPTMZ PLPCTM TRP

## (undated) DEVICE — KIT ENDO ORCAPOD 160/180/190

## (undated) DEVICE — DRAPE SHEET LG

## (undated) DEVICE — SYRINGE MNJCT 35ML LF STRL LL

## (undated) DEVICE — SUTURE VICRYL 3-0 SH

## (undated) DEVICE — SPONGE: SPECIALTY PEANUT XR 100/CS: Brand: MEDICAL ACTION INDUSTRIES

## (undated) NOTE — LETTER
January 17, 2019     [place] [address]      Dear Candice Oneal:    Below are the results from your recent visit:  Urinary tract infection treated appropirately with keflex   Continue antibiotic  as prescribed   Drink plenty of wate

## (undated) NOTE — LETTER
Date: 4/21/2025    Patient Name: Tiana Abreu          To Whom it may concern:    The above patient was seen at [facility] for treatment of a medical condition.    Patient can return to work on May 12, 2025 without restrictions.        Sincerely,        Shayne Piña MD   Hand, Wrist, & Elbow Surgery  [email or web address]  t: [phone]  f: [phone]

## (undated) NOTE — LETTER
9/9/2024          To Whom It May Concern:    Tiana Abreu is currently under my medical care and     Activity is restricted as follows: no lifting over 5 lbs until she is seen by a surgeon. Or until 9/20/2024.    If you require additional information please contact our office.        Sincerely,    Citlali Awan MD          Document generated by:  Citlali Awan MD

## (undated) NOTE — LETTER
[address], [place]  Authorization for Invasive Procedures  1. I hereby authorize Dr. Aniket Ortiz , my physician and whomever may be designated as the doctor's assistant, to perform the following operation and/or procedure:  Colonoscopy on Luli May at [facility].    2. My physician has explained to me the nature and purpose of the operation or other procedure, possible alternative methods of treatment, the risks involved and the possibility of complications to me. I understand the probable consequences of declining the recommended procedure and the alternative methods of treatment. I acknowledge that no guarantee has been made as to the result that may be obtained. 3. I recognize that during the course of this operation or other procedure, unforeseen conditions may necessitate additional or different procedures than those listed above. I, therefore, further authorize and request that the above-named physician, his/her physician assistants, or designees perform such procedures as are, in his/her professional opinion, necessary and desirable. If I have a Do Not Attempt Resuscitation (DNAR) order in place, that status will be suspended while in the operating room, procedural suite, and during the recovery period unless otherwise explicitly stated by me (or a person authorized to consent on my behalf). The surgeon or my attending physician will determine when the applicable recovery period ends for purposes of reinstating the DNAR order. 4. Should the need arise during my operation or immediate post-operative period; I also consent to the administration of blood and/or blood products.  Further, I understand that despite careful testing and screening of blood and blood products, I may still be subject to ill effects as a result of recieving a blood transfusion an/or blood producst. The following are some, but not all, of the potential risks that can occur: fever and allergic reactions, hemolytic reactions, transmission of disease such as hepatitis, AIDS, cytomegalovirus (CMV), and flluid overload. In the event that I wish to have autologous transfusions of my own blood, or a directed donor transfusion, I will discuss this with my physician. 5. I consent to the photographing of the operations or procedures to be performed for the purposes of advancing medicine, science, and/or education, provided my identity is not revealed. If the procedure has been videotaped, the physician/surgeon will obtain the original videotape. The hospital will not be responsible for storage or maintenance of this tape. 6. I consent to the presence of a  or observer as deemed necessary by my physician or his designee. 7. Any tissues or organs removed in the operation or other procedure may be disposed of by and at the discretion of [facility].    8. I understand that the physician and his/her physician assistants may not be employees or agents of [facility], [facility], nor [facility], but are independent medical practitioners who have been permitted to use its facilities for the care and treatment of their patients. 9. Patients having a sterilization procedure: I understand that if the procedure is successful the results will be permanent and it will therefore be impossible for me to inseminate, conceive or bear children. I also understand that the procedure is intended to result in sterility, although the result has not been guaranteed. 10. I CERTIFY THAT I HAVE READ AND FULLY UNDERSTAND THE ABOVE CONSENT TO OPERATION and/or OTHER PROCEDURE. 11. I acknowledge that my physician has explained sedation/analgesia administration to me including the risks and benefits. I consent to the administration of sedation/analgesia as may be necessary or desirable in the judgment of my physician.      Signature of Patient:  ________________________________________________ Date: _________Time: _________    Responsible person in case of minor or unconscious: _____________________________Relationship: ____________     Witness Signature: ____________________________________________ Date: __________ Time: ___________    Statement of Physician  My signature below affirms that prior to the time of the procedure, I have explained to the patient and/or her legal representative, the risks and benefits involved in the proposed treatment and any reasonable alternative to the proposed treatment. I have also explained the risks and benefits involved in the refusal of the proposed treatment and have answered the patient's questions. If I have a significant financial interest in this procedure/surgery, I have disclosed this and had a discussion with my patient.     Signature of Physician:   ________________________________________Date: _________Time:_______ Patient Name: Luli May  : 1965   Printed: 2022    Medical Record #: [id number]

## (undated) NOTE — LETTER
[facility]  Administration of Anesthesia  1. I, Camilo Hunter, or _________________________________ acting on her behalf, (Patient) (Dependent/Representative) request to receive anesthesia for my pending procedure/operation/treatment. bleeding, seizure, cardiac arrest and death. 7. AWARENESS: I understand that it is possible (but unlikely) to have explicit memory of events from the operating room while under general anesthesia.   8. ELECTROCONVULSIVE THERAPY PATIENTS: This consent serve below affirms that prior to the time of the procedure, I have explained to the patient and/or his/her guardian, the risks and benefits of undergoing anesthesia, as well as any reasonable alternatives.     ___________________________________________________

## (undated) NOTE — LETTER
5/12/2025    Tiana Abreu        [address]        [facility] [address]            Dear Tiana Abreu,      Our records indicate that you are due for an appointment for a Colonoscopy with Jeffrey Alas MD. Our doctors are booking out about 3-6 months in advance for procedures.     Please call our office to schedule this appointment.  Your medical well-being is important to us.    If your insurance requires a referral, please call your primary care office to request one.      Thank you,      The Physicians and Staff at [facility]

## (undated) NOTE — LETTER
[address]  [facility] [place], [facility], [place]  [address]  INFORMED CONSENT FOR TRANSFUSION OF BLOOD OR BLOOD PRODUCTS  My physician has informed me of the nature, purpose, benefits and risks of transfusion for blood and blood components that ______________________________________________  (Signature of Patient)                                                            (Responsible party in case of Minor,

## (undated) NOTE — LETTER
[address]  [address] [place], [place]      Authorization for Surgical Operation and Procedure     Date:___________                                                                                                         Time:_______ potential risks that can occur: fever and allergic reactions, hemolytic reactions, transmission of diseases such as Hepatitis, AIDS and Cytomegalovirus (CMV) and fluid overload.   In the event that I wish to have an autologous transfusion of my own blood, o will determine when the applicable recovery period ends for purposes of reinstating the DNAR order.   10. Patients having a sterilization procedure: I understand that if the procedure is successful the results will be permanent and it will therefore be impo _______________________________________________________________ _____________________________  Hammad Douglas Physician)                                                                                         (Date)                                   (Time)

## (undated) NOTE — LETTER
AUTHORIZATION FOR SURGICAL OPERATION OR OTHER PROCEDURE    1. I hereby authorize Dr. Juanito Gandhi, and [address] [place] [address] staff assigned to my case to perform the following operation and/or procedure at the [address] [place] [address]:    Endometrial Biopsy    2.   My p Relationship to Patient:             Parent    Responsible person                            Spouse  In case of minor or                     Other  _____________   Incompetent name:  __________________________________________________

## (undated) NOTE — LETTER
[address]  [address] [place], [place]      Authorization for Surgical Operation and Procedure     Date:___________                                                                                                         Time:_______ transfusion and/or blood products.   The following are some, but not all, of the potential risks that can occur: fever and allergic reactions, hemolytic reactions, transmission of diseases such as Hepatitis, AIDS and Cytomegalovirus (CMV) and fluid overload person authorized to consent on my behalf). The surgeon or my attending physician will determine when the applicable recovery period ends for purposes of reinstating the DNAR order.   10. Patients having a sterilization procedure: I understand that if the p I have disclosed this and had a discussion with my patient.     _______________________________________________________________ _____________________________  Jessica Candelario)

## (undated) NOTE — LETTER
12/13/18        [address] [place] [address]      Dear Daniel Gardner,    [facility] records indicate that you have outstanding lab work and or testing that was ordered for you and has not yet been completed:  Orders Placed This Encounter        Mammo

## (undated) NOTE — LETTER
[address], [place]  Authorization for Invasive Procedures  1.  I hereby authorize Dr. Angela Baires , my physician and whomever may be designated as the doctor's assistant, to perform the following operation and/or procedure:  Ultra all, of the potential risks that can occur: fever and allergic reactions, hemolytic reactions, transmission of disease such as hepatitis, AIDS, cytomegalovirus (CMV), and flluid overload.  In the event that I wish to have autologous transfusions of my own b desirable in the judgment of my physician.      Signature of Patient:  ________________________________________________ Date: _________Time: _________    Responsible person in case of minor or unconscious: _____________________________Relationship: ________

## (undated) NOTE — LETTER
4/4/2025    Tiana Abreu        [address]        [facility] [address]            Dear Tiana Abreu,      Our records indicate that you are due for an appointment for a Colonoscopy with Jeffrey Alas MD. Our doctors are booking out about 3-6 months in advance for procedures.     Please call our office to schedule this appointment.  Your medical well-being is important to us.    If your insurance requires a referral, please call your primary care office to request one.      Thank you,      The Physicians and Staff at [facility]

## (undated) NOTE — LETTER
[place], [address] [facility]  [address]       07/20/21        Patient: Juliano Reed   YOB: 1965   Date of Visit: 7/20/2021       Dear  Dr. Ann Benitez MD,      Thank you for referring Juliano Reed to my practice.   Prasanna

## (undated) NOTE — MR AVS SNAPSHOT
[facility] [place] [address] [address] [address] [phone]               Thank you for choosing us for your health care visit with Ricky Huff MD.  We are glad to serve you and happy to provide you with this summary of Assoc Dx:  Postmenopausal bleeding [N95.0]           DIAGNOSTIC SLEEP STUDY    Complete by:  As directed    Assoc Dx: Suspected sleep apnea [G47.30], Snoring [Y19.05]           Folic Acid Serum [E]    Complete by:   May 16, 2017 (Approximate)    Assoc Dx: [id number]   Phone:  [phone]   Fax:  [phone]         Referral Orders      Normal Orders This Visit    BARIATRICS - INTERNAL [[id number] CUSTOM]  Order #:  [id number]                  **REFERRAL REQUEST* [facility] - INTERNAL [[id number] CUSTOM]  Order #:  [id number]         **REFERRAL REQUEST**  Your physician has referred you to a specialist.  Your physician or the clinic staff will provide you with the phone number you should call to schedule your appointment. requirements for authorization, please wait 5-7 days and then contact your physician's   office. At that time, you will be provided with any authorization numbers or be assured that none are required. You can then schedule your appointment.  Failure to obta Sign up for [employer], your secure online medical record. [email or web address] will allow you to access patient instructions from your recent visit,  view other health information, and more. To sign up or find more information, go to [email or web address] and cl Don’t eat while distracted and slow down. Avoid over sized portions. Don’t eat while when you’re bored.      EAT THESE FOODS MORE OFTEN: EAT THESE FOODS LESS OFTEN:   Make half your plate fruits and vegetables Highly refined, white starches including wh

## (undated) NOTE — LETTER
[address]  [address] [place], [place]      Authorization for Surgical Operation and Procedure     Date:___________                                                                                                         Time:_______ potential risks that can occur: fever and allergic reactions, hemolytic reactions, transmission of diseases such as Hepatitis, AIDS and Cytomegalovirus (CMV) and fluid overload.   In the event that I wish to have an autologous transfusion of my own blood, o will determine when the applicable recovery period ends for purposes of reinstating the DNAR order.   10. Patients having a sterilization procedure: I understand that if the procedure is successful the results will be permanent and it will therefore be impo _______________________________________________________________ _____________________________  [employer] of Physician)                                                                                         (Date)                                   (Time)

## (undated) NOTE — LETTER
9/9/2024          To Whom It May Concern:    Tiana Abreu is currently under my medical care.    Activity is restricted as follows: limit physical activity to only tablet/computer work until 9/20/2024.  Avoid physical work or heavy lifting >5 lbs.    If you require additional information please contact our office.        Sincerely,    Citlali Awan MD          Document generated by:  Citlali Awan MD